# Patient Record
Sex: FEMALE | Race: WHITE | NOT HISPANIC OR LATINO | Employment: PART TIME | URBAN - METROPOLITAN AREA
[De-identification: names, ages, dates, MRNs, and addresses within clinical notes are randomized per-mention and may not be internally consistent; named-entity substitution may affect disease eponyms.]

---

## 2017-01-09 ENCOUNTER — TELEPHONE (OUTPATIENT)
Dept: GASTROENTEROLOGY | Facility: CLINIC | Age: 57
End: 2017-01-09

## 2017-04-19 ENCOUNTER — HOSPITAL ENCOUNTER (EMERGENCY)
Facility: HOSPITAL | Age: 57
Discharge: HOME | End: 2017-04-19
Attending: EMERGENCY MEDICINE | Admitting: EMERGENCY MEDICINE

## 2017-04-19 ENCOUNTER — APPOINTMENT (OUTPATIENT)
Dept: GENERAL RADIOLOGY | Facility: HOSPITAL | Age: 57
End: 2017-04-19

## 2017-04-19 VITALS
SYSTOLIC BLOOD PRESSURE: 117 MMHG | DIASTOLIC BLOOD PRESSURE: 81 MMHG | HEART RATE: 78 BPM | HEIGHT: 63 IN | OXYGEN SATURATION: 96 % | BODY MASS INDEX: 34.55 KG/M2 | TEMPERATURE: 97.9 F | RESPIRATION RATE: 16 BRPM | WEIGHT: 195 LBS

## 2017-04-19 DIAGNOSIS — S82.832A CLOSED FRACTURE OF DISTAL END OF LEFT FIBULA, UNSPECIFIED FRACTURE MORPHOLOGY, INITIAL ENCOUNTER: Primary | ICD-10-CM

## 2017-04-19 PROCEDURE — 99284 EMERGENCY DEPT VISIT MOD MDM: CPT | Performed by: EMERGENCY MEDICINE

## 2017-04-19 PROCEDURE — 73610 X-RAY EXAM OF ANKLE: CPT

## 2017-04-19 PROCEDURE — 99283 EMERGENCY DEPT VISIT LOW MDM: CPT

## 2017-04-19 RX ORDER — SACCHAROMYCES BOULARDII 250 MG
250 CAPSULE ORAL 2 TIMES DAILY
COMMUNITY
End: 2017-05-03

## 2017-04-19 RX ORDER — HYDROCODONE BITARTRATE AND ACETAMINOPHEN 5; 325 MG/1; MG/1
1 TABLET ORAL EVERY 6 HOURS PRN
Qty: 30 TABLET | Refills: 0 | Status: SHIPPED | OUTPATIENT
Start: 2017-04-19 | End: 2017-05-03

## 2017-04-19 RX ORDER — NICOTINE POLACRILEX 2 MG
GUM BUCCAL EVERY OTHER DAY
COMMUNITY

## 2017-04-19 RX ORDER — FLUOXETINE HYDROCHLORIDE 20 MG/1
60 CAPSULE ORAL DAILY
COMMUNITY
End: 2021-05-20

## 2017-04-19 NOTE — DISCHARGE INSTRUCTIONS
Medications directed.  Wear orthoboot for ambulation or any weightbearing activities until seen by Ortho.  Ice and elevation will help with pain and swelling.  Follow-up with Dr. Blanco as above.  Return to ED for medical emergencies.    Hypertension  Hypertension, commonly called high blood pressure, is when the force of blood pumping through your arteries is too strong. Your arteries are the blood vessels that carry blood from your heart throughout your body. A blood pressure reading consists of a higher number over a lower number, such as 110/72. The higher number (systolic) is the pressure inside your arteries when your heart pumps. The lower number (diastolic) is the pressure inside your arteries when your heart relaxes. Ideally you want your blood pressure below 120/80.  Hypertension forces your heart to work harder to pump blood. Your arteries may become narrow or stiff. Having untreated or uncontrolled hypertension can cause heart attack, stroke, kidney disease, and other problems.  RISK FACTORS  Some risk factors for high blood pressure are controllable. Others are not.   Risk factors you cannot control include:   · Race. You may be at higher risk if you are .  · Age. Risk increases with age.  · Gender. Men are at higher risk than women before age 45 years. After age 65, women are at higher risk than men.  Risk factors you can control include:  · Not getting enough exercise or physical activity.  · Being overweight.  · Getting too much fat, sugar, calories, or salt in your diet.  · Drinking too much alcohol.  SIGNS AND SYMPTOMS  Hypertension does not usually cause signs or symptoms. Extremely high blood pressure (hypertensive crisis) may cause headache, anxiety, shortness of breath, and nosebleed.  DIAGNOSIS  To check if you have hypertension, your health care provider will measure your blood pressure while you are seated, with your arm held at the level of your heart. It should be measured  at least twice using the same arm. Certain conditions can cause a difference in blood pressure between your right and left arms. A blood pressure reading that is higher than normal on one occasion does not mean that you need treatment. If it is not clear whether you have high blood pressure, you may be asked to return on a different day to have your blood pressure checked again. Or, you may be asked to monitor your blood pressure at home for 1 or more weeks.  TREATMENT  Treating high blood pressure includes making lifestyle changes and possibly taking medicine. Living a healthy lifestyle can help lower high blood pressure. You may need to change some of your habits.  Lifestyle changes may include:  · Following the DASH diet. This diet is high in fruits, vegetables, and whole grains. It is low in salt, red meat, and added sugars.  · Keep your sodium intake below 2,300 mg per day.  · Getting at least 30-45 minutes of aerobic exercise at least 4 times per week.  · Losing weight if necessary.  · Not smoking.  · Limiting alcoholic beverages.  · Learning ways to reduce stress.  Your health care provider may prescribe medicine if lifestyle changes are not enough to get your blood pressure under control, and if one of the following is true:  · You are 18-59 years of age and your systolic blood pressure is above 140.  · You are 60 years of age or older, and your systolic blood pressure is above 150.  · Your diastolic blood pressure is above 90.  · You have diabetes, and your systolic blood pressure is over 140 or your diastolic blood pressure is over 90.  · You have kidney disease and your blood pressure is above 140/90.  · You have heart disease and your blood pressure is above 140/90.  Your personal target blood pressure may vary depending on your medical conditions, your age, and other factors.  HOME CARE INSTRUCTIONS  · Have your blood pressure rechecked as directed by your health care provider.    · Take medicines only  as directed by your health care provider. Follow the directions carefully. Blood pressure medicines must be taken as prescribed. The medicine does not work as well when you skip doses. Skipping doses also puts you at risk for problems.  · Do not smoke.    · Monitor your blood pressure at home as directed by your health care provider.   SEEK MEDICAL CARE IF:   · You think you are having a reaction to medicines taken.  · You have recurrent headaches or feel dizzy.  · You have swelling in your ankles.  · You have trouble with your vision.  SEEK IMMEDIATE MEDICAL CARE IF:  · You develop a severe headache or confusion.  · You have unusual weakness, numbness, or feel faint.  · You have severe chest or abdominal pain.  · You vomit repeatedly.  · You have trouble breathing.  MAKE SURE YOU:   · Understand these instructions.  · Will watch your condition.  · Will get help right away if you are not doing well or get worse.     This information is not intended to replace advice given to you by your health care provider. Make sure you discuss any questions you have with your health care provider.     Document Released: 12/18/2006 Document Revised: 05/03/2016 Document Reviewed: 10/10/2014  [employer] Patient Education ©2016 [employer]

## 2017-04-19 NOTE — ED NOTES
Went over discharge instructions with patient, alert and oriented at time of discharge, no questions at this time. Left ambulatory.       Maya Horn RN  04/19/17 2579

## 2017-04-19 NOTE — ED PROVIDER NOTES
"Subjective   Patient is a 56 y.o. female presenting with lower extremity pain.   History provided by:  Patient  Lower Extremity Issue   Location:  Ankle  Time since incident:  1 week  Injury: yes    Mechanism of injury comment:  Stepped in a hole as described below.  Ankle location:  L ankle  Pain details:     Severity:  Severe    Onset quality:  Sudden    Duration:  1 week    Timing:  Constant    Progression:  Unchanged  Chronicity:  New  Dislocation: no    Foreign body present:  No foreign bodies  Prior injury to area:  No  Relieved by:  Nothing  Worsened by:  Bearing weight  Ineffective treatments:  Ice  Associated symptoms: decreased ROM (secondary to pain) and swelling    Associated symptoms: no fever    Risk factors: no concern for non-accidental trauma, no frequent fractures, no known bone disorder and no obesity      HPI Narrative:Ms Brizuela is a 44 yo WF who presents left ankle injury.  Patient states last week she was [place] the beach when she stepped in a hole.  She felt immediate pain and heard a popping in her left ankle.  The pain was significant enough that patient \"turned quite\" this was followed by an nausea and vomiting.  Patient has not seen a physician until today.  She has been trying to care for at home.  However it is not improving.  She presents for evaluation.        Review of Systems   Constitutional: Negative.  Negative for appetite change, diaphoresis and fever.   HENT: Negative.    Eyes: Negative.    Respiratory: Negative for cough, chest tightness, shortness of breath and wheezing.    Cardiovascular: Negative for chest pain, palpitations and leg swelling.   Genitourinary: Negative.  Negative for difficulty urinating, flank pain, frequency and hematuria.   Musculoskeletal: Negative.    Skin: Negative.  Negative for color change, pallor and rash.   Neurological: Negative.  Negative for dizziness, seizures, syncope and headaches.   Psychiatric/Behavioral: Negative.  Negative for " agitation, behavioral problems and hallucinations.       Past Medical History:   Diagnosis Date   • Anxiety and depression    • Hyperlipidemia    • Seasonal allergies        No Known Allergies    Past Surgical History:   Procedure Laterality Date   • HYSTERECTOMY         Family History   Problem Relation Age of Onset   • Heart disease Mother    • Colon polyps Brother    • Diabetes Maternal Grandmother        Social History     Social History   • Marital status:      Spouse name: N/A   • Number of children: N/A   • Years of education: N/A     Social History Main Topics   • Smoking status: Never Smoker   • Smokeless tobacco: None   • Alcohol use No   • Drug use: None   • Sexual activity: Not Asked     Other Topics Concern   • None     Social History Narrative           Objective   Physical Exam   Constitutional: She is oriented to person, place, and time. She appears well-developed and well-nourished. No distress.   56-year-old white female laying in bed.  She appears in good overall health.  She is friendly and cooperative.   Musculoskeletal:        Left ankle: She exhibits decreased range of motion and swelling. Tenderness. Lateral malleolus tenderness found. Achilles tendon normal.        Neurological: She is alert and oriented to person, place, and time.   Skin: She is not diaphoretic.   Nursing note and vitals reviewed.      Procedures         ED Course  ED Course   Comment By Time   04/19/17  4:02 PM  X-ray shows fracture distal tip of fibula nondisplaced.  Will place patient in walking boot.  Prescribe pain medication and give orthopedic follow-up. Grayson Dean MD 04/19 1602      Xr Ankle 3+ View Left    Result Date: 4/19/2017  Narrative: LEFT ANKLE, 04/19/2017:  HISTORY: 56-year-old female in the ED complaining of mostly lateral left ankle pain and swelling after injury. Twisted her ankle walking on the beach one week ago.  TECHNIQUE: Three-view left ankle series.  FINDINGS: There is a small,  acute, nondisplaced fracture across the tip of the lateral malleolus. No other ankle fracture is demonstrated.  Soft tissue swelling surrounds the ankle, greatest laterally. Incidentally noted superficial venous varicosities in the medial lower leg.      Impression: 1. Nondisplaced fracture across the tip of the lateral malleolus. 2. Soft tissue swelling as noted.  This report was finalized on 4/19/2017 3:48 PM by Dr. Adrian Zurita MD.                  MDM  Number of Diagnoses or Management Options  Closed fracture of distal end of left fibula, unspecified fracture morphology, initial encounter: new and requires workup     Amount and/or Complexity of Data Reviewed  Tests in the radiology section of CPT®: ordered and reviewed    Risk of Complications, Morbidity, and/or Mortality  Presenting problems: moderate  Diagnostic procedures: low  Management options: moderate    Patient Progress  Patient progress: stable      Final diagnoses:   Closed fracture of distal end of left fibula, unspecified fracture morphology, initial encounter              Grayson Dean MD  04/20/17 0114

## 2017-05-01 ENCOUNTER — APPOINTMENT (OUTPATIENT)
Dept: GENERAL RADIOLOGY | Facility: HOSPITAL | Age: 57
End: 2017-05-01

## 2017-05-01 ENCOUNTER — HOSPITAL ENCOUNTER (OUTPATIENT)
Facility: HOSPITAL | Age: 57
Setting detail: OBSERVATION
Discharge: HOME | End: 2017-05-02
Attending: EMERGENCY MEDICINE | Admitting: HOSPITALIST

## 2017-05-01 DIAGNOSIS — R07.9 CHEST PAIN, UNSPECIFIED TYPE: Primary | ICD-10-CM

## 2017-05-01 LAB
ALBUMIN SERPL-MCNC: 4.1 G/DL (ref 3.5–5.2)
ALBUMIN/GLOB SERPL: 1.5 G/DL
ALP SERPL-CCNC: 88 U/L (ref 40–129)
ALT SERPL W P-5'-P-CCNC: 19 U/L (ref 5–33)
ANION GAP SERPL CALCULATED.3IONS-SCNC: 14 MMOL/L
APTT PPP: 30.6 SECONDS (ref 24.3–38.1)
AST SERPL-CCNC: 17 U/L (ref 5–32)
BASOPHILS # BLD AUTO: 0.07 10*3/MM3 (ref 0–0.2)
BASOPHILS NFR BLD AUTO: 0.7 % (ref 0–2)
BILIRUB SERPL-MCNC: 0.2 MG/DL (ref 0.2–1.2)
BUN BLD-MCNC: 13 MG/DL (ref 6–20)
BUN/CREAT SERPL: 17.3 (ref 7–25)
CALCIUM SPEC-SCNC: 9.2 MG/DL (ref 8.6–10.5)
CHLORIDE SERPL-SCNC: 99 MMOL/L (ref 98–107)
CO2 SERPL-SCNC: 28 MMOL/L (ref 22–29)
CREAT BLD-MCNC: 0.75 MG/DL (ref 0.57–1)
D DIMER PPP FEU-MCNC: <0.3 MCGFEU/ML (ref 0–0.46)
DEPRECATED RDW RBC AUTO: 43.5 FL (ref 37–54)
EOSINOPHIL # BLD AUTO: 0.26 10*3/MM3 (ref 0.1–0.3)
EOSINOPHIL NFR BLD AUTO: 2.7 % (ref 0–4)
ERYTHROCYTE [DISTWIDTH] IN BLOOD BY AUTOMATED COUNT: 13 % (ref 11.5–14.5)
GFR SERPL CREATININE-BSD FRML MDRD: 81 ML/MIN/1.73
GLOBULIN UR ELPH-MCNC: 2.8 GM/DL
GLUCOSE BLD-MCNC: 137 MG/DL (ref 65–99)
HCT VFR BLD AUTO: 41.2 % (ref 37–47)
HGB BLD-MCNC: 13.6 G/DL (ref 12–16)
IMM GRANULOCYTES # BLD: 0.04 10*3/MM3 (ref 0–0.03)
IMM GRANULOCYTES NFR BLD: 0.4 % (ref 0–0.5)
INR PPP: 1.01 (ref 0.9–1.1)
LYMPHOCYTES # BLD AUTO: 2.73 10*3/MM3 (ref 0.6–4.8)
LYMPHOCYTES NFR BLD AUTO: 28.4 % (ref 20–45)
MCH RBC QN AUTO: 30.3 PG (ref 27–31)
MCHC RBC AUTO-ENTMCNC: 33 G/DL (ref 31–37)
MCV RBC AUTO: 91.8 FL (ref 81–99)
MONOCYTES # BLD AUTO: 0.52 10*3/MM3 (ref 0–1)
MONOCYTES NFR BLD AUTO: 5.4 % (ref 3–8)
NEUTROPHILS # BLD AUTO: 5.99 10*3/MM3 (ref 1.5–8.3)
NEUTROPHILS NFR BLD AUTO: 62.4 % (ref 45–70)
NRBC BLD MANUAL-RTO: 0 /100 WBC (ref 0–0)
PLATELET # BLD AUTO: 218 10*3/MM3 (ref 140–500)
PMV BLD AUTO: 10.2 FL (ref 7.4–10.4)
POTASSIUM BLD-SCNC: 3.4 MMOL/L (ref 3.5–5.2)
PROT SERPL-MCNC: 6.9 G/DL (ref 6–8.5)
PROTHROMBIN TIME: 13.3 SECONDS (ref 12.1–15)
RBC # BLD AUTO: 4.49 10*6/MM3 (ref 4.2–5.4)
SODIUM BLD-SCNC: 141 MMOL/L (ref 136–145)
TROPONIN T SERPL-MCNC: <0.01 NG/ML (ref 0–0.03)
WBC NRBC COR # BLD: 9.61 10*3/MM3 (ref 4.8–10.8)

## 2017-05-01 PROCEDURE — 80053 COMPREHEN METABOLIC PANEL: CPT | Performed by: EMERGENCY MEDICINE

## 2017-05-01 PROCEDURE — 85025 COMPLETE CBC W/AUTO DIFF WBC: CPT | Performed by: EMERGENCY MEDICINE

## 2017-05-01 PROCEDURE — 84484 ASSAY OF TROPONIN QUANT: CPT | Performed by: EMERGENCY MEDICINE

## 2017-05-01 PROCEDURE — 99284 EMERGENCY DEPT VISIT MOD MDM: CPT

## 2017-05-01 PROCEDURE — 93010 ELECTROCARDIOGRAM REPORT: CPT | Performed by: INTERNAL MEDICINE

## 2017-05-01 PROCEDURE — 93005 ELECTROCARDIOGRAM TRACING: CPT | Performed by: EMERGENCY MEDICINE

## 2017-05-01 PROCEDURE — 83735 ASSAY OF MAGNESIUM: CPT | Performed by: HOSPITALIST

## 2017-05-01 PROCEDURE — 85730 THROMBOPLASTIN TIME PARTIAL: CPT | Performed by: EMERGENCY MEDICINE

## 2017-05-01 PROCEDURE — 71020 HC CHEST PA AND LATERAL: CPT

## 2017-05-01 PROCEDURE — 99285 EMERGENCY DEPT VISIT HI MDM: CPT | Performed by: EMERGENCY MEDICINE

## 2017-05-01 PROCEDURE — 85379 FIBRIN DEGRADATION QUANT: CPT | Performed by: EMERGENCY MEDICINE

## 2017-05-01 PROCEDURE — 85610 PROTHROMBIN TIME: CPT | Performed by: EMERGENCY MEDICINE

## 2017-05-01 RX ORDER — ASPIRIN 81 MG/1
324 TABLET, CHEWABLE ORAL ONCE
Status: COMPLETED | OUTPATIENT
Start: 2017-05-01 | End: 2017-05-01

## 2017-05-01 RX ADMIN — ASPIRIN 81 MG CHEWABLE TABLET 324 MG: 81 TABLET CHEWABLE at 23:40

## 2017-05-02 ENCOUNTER — APPOINTMENT (OUTPATIENT)
Dept: NUCLEAR MEDICINE | Facility: HOSPITAL | Age: 57
End: 2017-05-02

## 2017-05-02 ENCOUNTER — APPOINTMENT (OUTPATIENT)
Dept: CARDIOLOGY | Facility: HOSPITAL | Age: 57
End: 2017-05-02

## 2017-05-02 VITALS
HEIGHT: 63 IN | HEART RATE: 74 BPM | TEMPERATURE: 98.3 F | SYSTOLIC BLOOD PRESSURE: 126 MMHG | DIASTOLIC BLOOD PRESSURE: 84 MMHG | OXYGEN SATURATION: 96 % | WEIGHT: 200 LBS | RESPIRATION RATE: 18 BRPM | BODY MASS INDEX: 35.44 KG/M2

## 2017-05-02 LAB
ANION GAP SERPL CALCULATED.3IONS-SCNC: 10.4 MMOL/L
BH CV NUCLEAR PRIOR STUDY: 3
BH CV STRESS BP STAGE 1: NORMAL
BH CV STRESS BP STAGE 2: NORMAL
BH CV STRESS BP STAGE 3: NORMAL
BH CV STRESS BP STAGE 4: NORMAL
BH CV STRESS COMMENTS STAGE 1: NORMAL
BH CV STRESS COMMENTS STAGE 2: NORMAL
BH CV STRESS DOSE REGADENOSON STAGE 1: 0.4
BH CV STRESS DURATION MIN STAGE 1: 0
BH CV STRESS DURATION MIN STAGE 2: 4
BH CV STRESS DURATION SEC STAGE 1: 15
BH CV STRESS DURATION SEC STAGE 2: 0
BH CV STRESS HR STAGE 1: 98
BH CV STRESS HR STAGE 2: 108
BH CV STRESS HR STAGE 3: 107
BH CV STRESS HR STAGE 4: 95
BH CV STRESS O2 STAGE 1: 97
BH CV STRESS O2 STAGE 2: 99
BH CV STRESS PROTOCOL 1: NORMAL
BH CV STRESS RECOVERY BP: NORMAL MMHG
BH CV STRESS RECOVERY HR: 95 BPM
BH CV STRESS STAGE 1: 1
BH CV STRESS STAGE 2: 2
BH CV STRESS STAGE 3: 3
BH CV STRESS STAGE 4: 4
BUN BLD-MCNC: 11 MG/DL (ref 6–20)
BUN/CREAT SERPL: 17.2 (ref 7–25)
CALCIUM SPEC-SCNC: 8.8 MG/DL (ref 8.6–10.5)
CHLORIDE SERPL-SCNC: 103 MMOL/L (ref 98–107)
CHOLEST SERPL-MCNC: 170 MG/DL (ref 0–200)
CO2 SERPL-SCNC: 27.6 MMOL/L (ref 22–29)
CREAT BLD-MCNC: 0.64 MG/DL (ref 0.57–1)
GFR SERPL CREATININE-BSD FRML MDRD: 97 ML/MIN/1.73
GLUCOSE BLD-MCNC: 95 MG/DL (ref 65–99)
HDLC SERPL-MCNC: 43 MG/DL (ref 40–60)
LDLC SERPL CALC-MCNC: 115 MG/DL (ref 0–100)
LDLC/HDLC SERPL: 2.67 {RATIO}
LV EF NUC BP: 70 %
MAGNESIUM SERPL-MCNC: 2.3 MG/DL (ref 1.7–2.5)
MAXIMAL PREDICTED HEART RATE: 166 BPM
PERCENT MAX PREDICTED HR: 66.27 %
POTASSIUM BLD-SCNC: 4.3 MMOL/L (ref 3.5–5.2)
SODIUM BLD-SCNC: 141 MMOL/L (ref 136–145)
STRESS BASELINE BP: NORMAL MMHG
STRESS BASELINE HR: 79 BPM
STRESS O2 SAT REST: 97 %
STRESS PERCENT HR: 78 %
STRESS POST ESTIMATED WORKLOAD: 1 METS
STRESS POST EXERCISE DUR SEC: 30 SEC
STRESS POST PEAK BP: NORMAL MMHG
STRESS POST PEAK HR: 110 BPM
STRESS TARGET HR: 141 BPM
TRIGL SERPL-MCNC: 61 MG/DL (ref 0–150)
TROPONIN T SERPL-MCNC: <0.01 NG/ML (ref 0–0.03)
TROPONIN T SERPL-MCNC: <0.01 NG/ML (ref 0–0.03)
VLDLC SERPL-MCNC: 12.2 MG/DL (ref 7–27)

## 2017-05-02 PROCEDURE — A9500 TC99M SESTAMIBI: HCPCS | Performed by: HOSPITALIST

## 2017-05-02 PROCEDURE — 93005 ELECTROCARDIOGRAM TRACING: CPT | Performed by: HOSPITALIST

## 2017-05-02 PROCEDURE — G0378 HOSPITAL OBSERVATION PER HR: HCPCS

## 2017-05-02 PROCEDURE — 84484 ASSAY OF TROPONIN QUANT: CPT | Performed by: HOSPITALIST

## 2017-05-02 PROCEDURE — 93010 ELECTROCARDIOGRAM REPORT: CPT | Performed by: INTERNAL MEDICINE

## 2017-05-02 PROCEDURE — 78452 HT MUSCLE IMAGE SPECT MULT: CPT | Performed by: INTERNAL MEDICINE

## 2017-05-02 PROCEDURE — 0 TECHNETIUM SESTAMIBI: Performed by: HOSPITALIST

## 2017-05-02 PROCEDURE — 80061 LIPID PANEL: CPT | Performed by: HOSPITALIST

## 2017-05-02 PROCEDURE — 80048 BASIC METABOLIC PNL TOTAL CA: CPT | Performed by: HOSPITALIST

## 2017-05-02 PROCEDURE — 93018 CV STRESS TEST I&R ONLY: CPT | Performed by: INTERNAL MEDICINE

## 2017-05-02 PROCEDURE — 78452 HT MUSCLE IMAGE SPECT MULT: CPT

## 2017-05-02 PROCEDURE — 25010000002 REGADENOSON 0.4 MG/5ML SOLUTION: Performed by: HOSPITALIST

## 2017-05-02 PROCEDURE — 96372 THER/PROPH/DIAG INJ SC/IM: CPT

## 2017-05-02 PROCEDURE — 93017 CV STRESS TEST TRACING ONLY: CPT

## 2017-05-02 PROCEDURE — 99244 OFF/OP CNSLTJ NEW/EST MOD 40: CPT | Performed by: INTERNAL MEDICINE

## 2017-05-02 PROCEDURE — 93016 CV STRESS TEST SUPVJ ONLY: CPT | Performed by: INTERNAL MEDICINE

## 2017-05-02 PROCEDURE — 25010000002 ENOXAPARIN PER 10 MG: Performed by: HOSPITALIST

## 2017-05-02 RX ORDER — FLUTICASONE PROPIONATE 50 MCG
2 SPRAY, SUSPENSION (ML) NASAL NIGHTLY
Status: DISCONTINUED | OUTPATIENT
Start: 2017-05-02 | End: 2017-05-02 | Stop reason: HOSPADM

## 2017-05-02 RX ORDER — SODIUM CHLORIDE 0.9 % (FLUSH) 0.9 %
1-10 SYRINGE (ML) INJECTION AS NEEDED
Status: DISCONTINUED | OUTPATIENT
Start: 2017-05-02 | End: 2017-05-02 | Stop reason: HOSPADM

## 2017-05-02 RX ORDER — IBUPROFEN 200 MG
200 TABLET ORAL NIGHTLY
COMMUNITY
End: 2017-05-02 | Stop reason: HOSPADM

## 2017-05-02 RX ORDER — HYDROCODONE BITARTRATE AND ACETAMINOPHEN 5; 325 MG/1; MG/1
1 TABLET ORAL EVERY 6 HOURS PRN
Status: DISCONTINUED | OUTPATIENT
Start: 2017-05-02 | End: 2017-05-02 | Stop reason: HOSPADM

## 2017-05-02 RX ORDER — L.ACID,PARA/B.BIFIDUM/S.THERM 8B CELL
1 CAPSULE ORAL 2 TIMES DAILY
Status: DISCONTINUED | OUTPATIENT
Start: 2017-05-02 | End: 2017-05-02 | Stop reason: HOSPADM

## 2017-05-02 RX ORDER — ACETAMINOPHEN 325 MG/1
650 TABLET ORAL EVERY 6 HOURS PRN
Status: DISCONTINUED | OUTPATIENT
Start: 2017-05-02 | End: 2017-05-02 | Stop reason: HOSPADM

## 2017-05-02 RX ORDER — ASPIRIN 325 MG
325 TABLET, DELAYED RELEASE (ENTERIC COATED) ORAL DAILY
Status: DISCONTINUED | OUTPATIENT
Start: 2017-05-02 | End: 2017-05-02 | Stop reason: HOSPADM

## 2017-05-02 RX ORDER — AZELASTINE 1 MG/ML
1 SPRAY, METERED NASAL DAILY
COMMUNITY
End: 2021-11-16 | Stop reason: SDUPTHER

## 2017-05-02 RX ORDER — NITROGLYCERIN 0.4 MG/1
0.4 TABLET SUBLINGUAL
Status: DISCONTINUED | OUTPATIENT
Start: 2017-05-02 | End: 2017-05-02 | Stop reason: HOSPADM

## 2017-05-02 RX ORDER — FLUOXETINE HYDROCHLORIDE 20 MG/1
60 CAPSULE ORAL DAILY
Status: DISCONTINUED | OUTPATIENT
Start: 2017-05-02 | End: 2017-05-02

## 2017-05-02 RX ORDER — MELATONIN
2000 DAILY
Status: DISCONTINUED | OUTPATIENT
Start: 2017-05-02 | End: 2017-05-02 | Stop reason: HOSPADM

## 2017-05-02 RX ORDER — POTASSIUM CHLORIDE 20 MEQ/1
40 TABLET, EXTENDED RELEASE ORAL ONCE
Status: COMPLETED | OUTPATIENT
Start: 2017-05-02 | End: 2017-05-02

## 2017-05-02 RX ORDER — AZELASTINE 1 MG/ML
1 SPRAY, METERED NASAL DAILY
Status: DISCONTINUED | OUTPATIENT
Start: 2017-05-02 | End: 2017-05-02 | Stop reason: HOSPADM

## 2017-05-02 RX ORDER — FLUOXETINE HYDROCHLORIDE 20 MG/1
60 CAPSULE ORAL NIGHTLY
Status: DISCONTINUED | OUTPATIENT
Start: 2017-05-02 | End: 2017-05-02 | Stop reason: HOSPADM

## 2017-05-02 RX ADMIN — Medication 1 DOSE: at 11:15

## 2017-05-02 RX ADMIN — POTASSIUM CHLORIDE 40 MEQ: 20 TABLET, EXTENDED RELEASE ORAL at 01:48

## 2017-05-02 RX ADMIN — Medication 1 DOSE: at 14:15

## 2017-05-02 RX ADMIN — ENOXAPARIN SODIUM 40 MG: 40 INJECTION SUBCUTANEOUS at 08:56

## 2017-05-02 RX ADMIN — REGADENOSON 0.4 MG: 0.08 INJECTION, SOLUTION INTRAVENOUS at 14:15

## 2017-05-02 RX ADMIN — ASPIRIN 325 MG: 325 TABLET, DELAYED RELEASE ORAL at 08:56

## 2017-05-03 ENCOUNTER — OFFICE VISIT (OUTPATIENT)
Dept: ORTHOPEDIC SURGERY | Facility: CLINIC | Age: 57
End: 2017-05-03

## 2017-05-03 VITALS
HEIGHT: 63 IN | SYSTOLIC BLOOD PRESSURE: 109 MMHG | WEIGHT: 195 LBS | HEART RATE: 90 BPM | DIASTOLIC BLOOD PRESSURE: 80 MMHG | BODY MASS INDEX: 34.55 KG/M2

## 2017-05-03 DIAGNOSIS — S82.839A AVULSION FRACTURE OF DISTAL FIBULA: Primary | ICD-10-CM

## 2017-05-03 PROCEDURE — 27786 TREATMENT OF ANKLE FRACTURE: CPT | Performed by: ORTHOPAEDIC SURGERY

## 2017-05-03 PROCEDURE — 99203 OFFICE O/P NEW LOW 30 MIN: CPT | Performed by: ORTHOPAEDIC SURGERY

## 2017-06-06 ENCOUNTER — OFFICE VISIT (OUTPATIENT)
Dept: ORTHOPEDIC SURGERY | Facility: CLINIC | Age: 57
End: 2017-06-06

## 2017-06-06 DIAGNOSIS — R52 PAIN: Primary | ICD-10-CM

## 2017-06-06 DIAGNOSIS — S82.839A AVULSION FRACTURE OF DISTAL FIBULA: ICD-10-CM

## 2017-06-06 PROCEDURE — 73610 X-RAY EXAM OF ANKLE: CPT | Performed by: ORTHOPAEDIC SURGERY

## 2017-06-06 PROCEDURE — 99024 POSTOP FOLLOW-UP VISIT: CPT | Performed by: ORTHOPAEDIC SURGERY

## 2017-06-11 NOTE — PROGRESS NOTES
CC: F/u closed treatment left distal fibula avulsion fracture, DOI 14 2017    Interval Hx: Pt returns to clinic today stating pain is improved, has already started to wean out of the boot on her own and been fully weightbearing without difficulty. Denies any numbness or tingling in foot, no skin breakdown or irritation.     Exam   Left ankle-minimal TTP along lateral distal fibula    DF 25°    PF 45°    Flex/extend toes without difficulty    Skin intact    Positive sensation left foot    No calf pain or swelling    Imaging   3v xrays left ankle, ordered and reviewed by me, compared to prior office xrays, indication ankle fracture. Stable alignment at a avulsion fracture site, no interval displacement, Acceptable alignment on mortise view with no medial clear space or syndesmosis widening.  Minimal interval callous noted.     Impression: s/p closed treatment left distal fibula avulsion fracture    Plan:  Can wean out of boot completely at this point time, using was of brace only intermittently as needed.    Recommended ankle strengthening exercises, offered physical therapy but patient has declined at this time   F/u in 4 weeks to reassess motion, strength, and functional activity.  No x-rays needed

## 2017-12-11 ENCOUNTER — TRANSCRIBE ORDERS (OUTPATIENT)
Dept: ADMINISTRATIVE | Facility: HOSPITAL | Age: 57
End: 2017-12-11

## 2017-12-11 DIAGNOSIS — Z12.31 SCREENING MAMMOGRAM, ENCOUNTER FOR: Primary | ICD-10-CM

## 2018-01-02 ENCOUNTER — APPOINTMENT (OUTPATIENT)
Dept: MAMMOGRAPHY | Facility: HOSPITAL | Age: 58
End: 2018-01-02
Attending: INTERNAL MEDICINE

## 2018-11-30 ENCOUNTER — TRANSCRIBE ORDERS (OUTPATIENT)
Dept: ADMINISTRATIVE | Facility: HOSPITAL | Age: 58
End: 2018-11-30

## 2018-11-30 DIAGNOSIS — J32.9 CHRONIC SINUSITIS, UNSPECIFIED LOCATION: Primary | ICD-10-CM

## 2018-12-04 ENCOUNTER — HOSPITAL ENCOUNTER (OUTPATIENT)
Dept: CT IMAGING | Facility: HOSPITAL | Age: 58
Discharge: HOME | End: 2018-12-04
Attending: INTERNAL MEDICINE | Admitting: INTERNAL MEDICINE

## 2018-12-04 DIAGNOSIS — J32.9 CHRONIC SINUSITIS, UNSPECIFIED LOCATION: ICD-10-CM

## 2018-12-04 PROCEDURE — 70486 CT MAXILLOFACIAL W/O DYE: CPT

## 2019-03-06 ENCOUNTER — HOSPITAL ENCOUNTER (OUTPATIENT)
Dept: PHYSICAL THERAPY | Facility: HOSPITAL | Age: 59
Setting detail: THERAPIES SERIES
Discharge: HOME | End: 2019-03-06

## 2019-03-06 DIAGNOSIS — M75.52 BURSITIS OF LEFT SHOULDER: Primary | ICD-10-CM

## 2019-03-06 DIAGNOSIS — M75.82 ROTATOR CUFF TENDONITIS, LEFT: ICD-10-CM

## 2019-03-06 PROCEDURE — 97110 THERAPEUTIC EXERCISES: CPT | Performed by: PHYSICAL THERAPIST

## 2019-03-06 PROCEDURE — 97161 PT EVAL LOW COMPLEX 20 MIN: CPT | Performed by: PHYSICAL THERAPIST

## 2019-03-06 NOTE — THERAPY EVALUATION
Outpatient Physical Therapy Ortho Initial Evaluation  SATHYA Abbott     Patient Name: Mary Jo Brizuela  : 1960  MRN: [id number]  Today's Date: 3/6/2019      Visit Date: 2019    Patient Active Problem List   Diagnosis   • Chest pain   • Avulsion fracture of distal fibula        Past Medical History:   Diagnosis Date   • Anemia    • Anxiety and depression    • Hyperlipidemia    • Obesity    • Seasonal allergies         Past Surgical History:   Procedure Laterality Date   • HYSTERECTOMY         Visit Dx:     ICD-10-CM ICD-9-CM   1. Bursitis of left shoulder M75.52 726.10   2. 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  Patient has not worked as  for 4 years.  Patient states that she had to lift her father from floor 2 weeks ago and has had increased pain since.  Patient reports that she has not had any testing on shoulder and has had no prior PT.  Patient was prescribed gabapentin and another med but states she does not want to take due to side effects.   Patient takes ibuprofen.  She reports that her pain has been so bad that she has not been able to drive  -SP      Patient/Caregiver Goals  Relieve pain  -SP      Current Tobacco Use  no  -SP      Patient's Rating of General Health  Good  -SP      Hand Dominance  left-handed  -SP      Occupation/sports/leisure activities  [employer] , she works on computer and reports that this does increase her symptoms  -SP      How has patient tried to help current problem?  OTC anti inflammatories  -SP      What clinical tests have you had for this problem?  -- nnone  -SP         Pain     Pain  Location  Shoulder  -SP      Pain at Present  1  -SP      Pain at Best  0  -SP      Pain at Worst  10  -SP      Pain Frequency  Intermittent  -SP      Pain Description  Numbness;Sharp;Shooting  -SP      What Performance Factors Make the Current Problem(s) WORSE?  working on computer, any activity with shoulder in abduction, pain with swinging arm to walk  -SP      What Performance Factors Make the Current Problem(s) BETTER?  straighten elbow and hang arm  -SP      Tolerance Time- Standing  not limited   -SP      Tolerance Time- Sitting  not limited  -SP      Tolerance Time- Walking  not limited  -SP      Is your sleep disturbed?  Yes  -SP      What position do you sleep in?  -- like to sleep on left but unable  -SP      Difficulties at work?  difficulty tolerating computer work  -SP      Difficulties with ADL's?  difficulty dressing, fixing hair, is limiting use of left UE  -SP         Fall Risk Assessment    Any falls in the past year:  No  -SP         Daily Activities    Primary Language  English  -SP      Are you able to read  Yes  -SP      Are you able to write  Yes  -SP      How does patient learn best?  Listening;Reading  -SP      Teaching needs identified  Home Exercise Program;Management of Condition  -SP      Patient is concerned about/has problems with  Repetitive movements of the hand, arm, shoulder;Reaching over head  -SP      Does patient have problems with the following?  Depression;Anxiety  -SP      Pt Participated in POC and Goals  Yes  -SP         Safety    Are you being hurt, hit, or frightened by anyone at home or in your life?  No  -SP      Are you being neglected by a caregiver  No  -SP        User Key  (r) = Recorded By, (t) = Taken By, (c) = Cosigned By    Initials Name Provider Type    SP Emily Smith, PT Physical Therapist          PT Ortho     Row Name 03/06/19 1300       Posture/Observations    Forward Head  Mild  -SP    Cervical Lordosis  Decreased  -SP    Thoracic Kyphosis   -- CT junction kyphosis  -SP    Rounded Shoulders  Bilateral:;Moderate  -SP    Scapular Elevation  Bilateral:;Moderate  -SP    Posture/Observations Comments  elevated first rib; decreased first rib mobility (B)  -SP       DTR- Upper Quarter Clearing    Biceps (C5/6)  Bilateral:;1- Minimal response  -SP    Brachioradialis (C6)  Bilateral:;1- Minimal response  -SP    Triceps (C7)  Bilateral:;1- Minimal response  -SP       Neural Tension Signs- Upper Quarter Clearing    ULNTT 1  Left:;Postive  -SP    ULNTT 2  Left:;Postive  -SP    ULNTT 3  Left:;Postive  -SP       Sensory Screen for Light Touch- Upper Quarter Clearing    C5 (lateral upper arm)  Left:;Diminished  -SP    C6 (tip of thumb)  Left:;Diminished  -SP    T1 (medial lower arm)  Left:;Diminished  -SP       Shoulder Girdle Accessory Motions    Posterior glide of humerus  Left:;Hypomobile  -SP    Inferior glide of humerus  Left:;Hypomobile  -SP       Shoulder Impingement/Rotator Cuff Special Tests    Carbajal-Danny Test (RC Lesion vs. Bursitis)  Left:;Positive  -SP    Neer Impingement Test (RC Lesion vs. Bursitis)  Left:;Positive  -SP    Full Can Test (RC Lesion)  Left:;Negative  -SP    Empty Can Test (RC Lesion)  Left:;Negative  -SP       Shoulder Girdle Palpation    Subacromial Space  Left:;Tender  -SP    Deltoid  Left:;Tender  -SP    Upper Trap  Left:;Tender;Guarded/taut  -SP       General ROM    GENERAL ROM COMMENTS  cervical, right UE and (B) elbow AROM wfl  -SP       Left Upper Ext    Lt Shoulder Abduction AROM  148 degrees with pain at end range  -SP    Lt Shoulder Abduction PROM  170 degrees with pain at end range  -SP    Lt Shoulder Flexion AROM  121 degrees with pain at end range  -SP    Lt Shoulder Flexion PROM  147 degrees with pain at end range  -SP    Lt Shoulder External Rotation AROM  67 degrees with pain at end range  -SP    Lt Shoulder Internal Rotation AROM  57 degrees with pain at end range  -SP       MMT Left Upper Ext    Lt Shoulder Flexion  MMT, Gross Movement  (3+/5) fair plus  -SP    Lt Shoulder Extension MMT, Gross Movement  (4-/5) good minus  -SP    Lt Shoulder ABduction MMT, Gross Movement  (3+/5) fair plus  -SP    Lt Shoulder Internal Rotation MMT, Gross Movement  (3+/5) fair plus  -SP    Lt Shoulder External Rotation MMT, Gross Movement  (3/5) fair  -SP    Lt Scapular ADduction MMT, Gross Movement  (2+/5) poor plus  -SP    Lt Scapular ADduction & Depression MMT, Gross Movement  (2+/5) poor plus  -SP    Lt Upper Extremity Comments   pain limited strength tests  -SP       Upper Extremity Flexibility    Upper Trapezius  Left:;Moderately limited  -SP    Pect Minor  Left:;Moderately limited  -SP       Transfers    Comment (Transfers)  independent transfers without difficulty  -SP      User Key  (r) = Recorded By, (t) = Taken By, (c) = Cosigned By    Initials Name Provider Type    Emily Mae, PT Physical Therapist                      Therapy Education  Given: HEP, Symptoms/condition management  Program: New  How Provided: Verbal, Written  Provided to: Patient  Level of Understanding: Verbalized, Demonstrated     PT OP Goals     Row Name 03/06/19 1700          PT Short Term Goals    STG Date to Achieve  03/20/19  -SP     STG 1  Patient demonstrates left shoulder AROM shoulder flexion to >130 degrees without complaints of pain at end range  -SP     STG 2  Patient reports decreased pain in left UE by end of work day to <2/10  at worst  -SP     STG 3  Patient exhibits left shoulder internal rotation to >65 degrees to aid in dressing and self care  -SP        Long Term Goals    LTG Date to Achieve  04/05/19  -SP     LTG 1  Patient demonstrates AROM left shoulder to wfl without complaints of pain at end range.    -SP     LTG 2  Patient demonstrates left UE strength increased by one muscle grade to better tolerate home and community ADLs  -SP     LTG 3  Patient independent with HEP  -SP        Time Calculation    PT Goal Re-Cert Due Date   "04/05/19  -SP       User Key  (r) = Recorded By, (t) = Taken By, (c) = Cosigned By    Initials Name Provider Type    Emily Mae, PT Physical Therapist          PT Assessment/Plan     Row Name 03/06/19 1756          PT Assessment    Functional Limitations  Limitation in home management;Limitations in community activities;Performance in work activities;Performance in self-care ADL;Performance in leisure activities;Limitations in functional capacity and performance  -SP     Impairments  Muscle strength;Pain;Posture;Range of motion;Endurance  -SP     Assessment Comments  Patient with chronic history of shoulder pain and injury to left shoulder approximately 2 weeks ago when she assisted her ill father from floor.   Patient presents with pain, decreased ROM, decreased strength and impaired functinal ability to complete home and community ADLs  -SP     Please refer to paper survey for additional self-reported information  Yes  -SP     Rehab Potential  Good  -SP     Patient/caregiver participated in establishment of treatment plan and goals  Yes  -SP     Patient would benefit from skilled therapy intervention  Yes  -SP        PT Plan    PT Frequency  2x/week  -SP     Predicted Duration of Therapy Intervention (Therapy Eval)  4 weeks  -SP     Planned CPT's?  PT EVAL LOW COMPLEXITY: 40133;PT MANUAL THERAPY EA 15 MIN: 82597;PT ULTRASOUND EA 15 MIN: 14705;PT THER PROC EA 15 MIN: 97314;PT ELECTRICAL STIM UNATTEND:   -SP       User Key  (r) = Recorded By, (t) = Taken By, (c) = Cosigned By    Initials Name Provider Type    Emily Mae, PT Physical Therapist          Modalities     Row Name 03/06/19 1700             Other Treatment Provided    Taping / Bracing  kinesiotape: RTC support  \"Y\" strip anchored at deltoid insertion with arm around anterior and posterior aspect of shoulder with space correct across lateral aspect of shoulder  -SP        User Key  (r) = Recorded By, (t) = Taken By, " (c) = Cosigned By    Initials Name Provider Type    Emily Mae, PT Physical Therapist        Exercises     Row Name 03/06/19 1700             Exercise 1    Exercise Name 1  tband rows  -SP      Reps 1  20  -SP      Time 1  green tband  -SP         Exercise 2    Exercise Name 2  tband shoulder IR  -SP      Reps 2  20   -SP      Time 2  green tband  -SP         Exercise 3    Exercise Name 3  tband shoulder ER  -SP      Reps 3  20  -SP      Time 3  green tband  -SP        User Key  (r) = Recorded By, (t) = Taken By, (c) = Cosigned By    Initials Name Provider Type    Emily Mae, PT Physical Therapist           Manual Rx (last 36 hours)      Manual Treatments     Row Name 03/06/19 1700             Manual Rx 1    Manual Rx 1 Location  left shoulder  -SP      Manual Rx 1 Type  PROM into all plans with gentle osc for pain  -SP      Manual Rx 1 Duration  10 reps into all planes  -SP        User Key  (r) = Recorded By, (t) = Taken By, (c) = Cosigned By    Initials Name Provider Type    Emily Mae, PT Physical Therapist                      Outcome Measure Options: Quick DASH  Quick DASH  Open a tight or new jar.: Severe Difficulty  Do heavy household chores (e.g., wash walls, wash floors): Unable  Carry a shopping bag or briefcase: Severe Difficulty  Wash your back: Unable  Use a knife to cut food: Moderate Difficulty  Recreational activities in which you take some force or impact through your arm, should or hand (e.g. golf, hammering, tennis, etc.): Severe Difficulty  During the past week, to what extent has your arm, shoulder, or hand problem interfered with your normal social activites with family, friends, neighbors or groups?: Quite a bit  During the past week, were you limited in your work or other regular daily activities as a result of your arm, shoulder or hand problem?: Very limited  Arm, Shoulder, or hand pain: Severe  Tingling (pins and needles) in your arm,  shoulder, or hand: Severe  During the past week, how much difficulty have you had sleeping because of the pain in your arm, shoulder or hand?: So much Difficulty that I can't sleep  Number of Questions Answered: 11  Quick DASH Score: 79.55  Work Module (Optional)  Using your usual technique for your work?: Severe Difficulty  Doing your usual work because of arm, shoulder or hand pain?: Severe Difficulty  Doing your work as well as you would like?: Moderate Difficulty  Spending your usual amount of time doing your work?: Severe Difficulty  Work Module Score: 68.75         Time Calculation:     Therapy Suggested Charges     Code   Minutes Charges    None             Start Time: 1125  Stop Time: 1240  Time Calculation (min): 75 min     Therapy Charges for Today     Code Description Service Date Service Provider Modifiers Qty    13041933814 HC PT THER PROC EA 15 MIN 3/6/2019 Emily Smith, PT GP 1    81860834855 HC PT EVAL LOW COMPLEXITY 3 3/6/2019 Emily Smith, PT GP 1          PT G-Codes  Outcome Measure Options: Quick DASH  Quick DASH Score: 79.55         Emily Smith, PT  3/6/2019

## 2019-03-12 ENCOUNTER — HOSPITAL ENCOUNTER (OUTPATIENT)
Dept: PHYSICAL THERAPY | Facility: HOSPITAL | Age: 59
Setting detail: THERAPIES SERIES
Discharge: HOME | End: 2019-03-12

## 2019-03-12 DIAGNOSIS — M75.82 ROTATOR CUFF TENDONITIS, LEFT: ICD-10-CM

## 2019-03-12 DIAGNOSIS — M75.52 BURSITIS OF LEFT SHOULDER: Primary | ICD-10-CM

## 2019-03-12 PROCEDURE — 97140 MANUAL THERAPY 1/> REGIONS: CPT | Performed by: PHYSICAL THERAPIST

## 2019-03-12 PROCEDURE — 97110 THERAPEUTIC EXERCISES: CPT | Performed by: PHYSICAL THERAPIST

## 2019-03-12 NOTE — THERAPY TREATMENT NOTE
Outpatient Physical Therapy Ortho Treatment Note  SATHYA Abbott     Patient Name: Mary Jo Brizuela  : 1960  MRN: [id number]  Today's Date: 3/12/2019      Visit Date: 2019    Visit Dx:    ICD-10-CM ICD-9-CM   1. Bursitis of left shoulder M75.52 726.10   2. Rotator cuff tendonitis, left M75.82 726.10       Patient Active Problem List   Diagnosis   • Chest pain   • Avulsion fracture of distal fibula        Past Medical History:   Diagnosis Date   • Anemia    • Anxiety and depression    • Hyperlipidemia    • Obesity    • Seasonal allergies         Past Surgical History:   Procedure Laterality Date   • HYSTERECTOMY         PT Ortho     Row Name 19 1110       Subjective Comments    Subjective Comments  Patient reports that she felt really good after last visit.  She has had intermittent sharp pain in shoulder with reaching activity.  -SP      User Key  (r) = Recorded By, (t) = Taken By, (c) = Cosigned By    Initials Name Provider Type    Emily Mae, PT Physical Therapist                      PT Assessment/Plan     Row Name 19 1331          PT Assessment    Assessment Comments  Patient tolerates progression of ther-ex well without complaints of increased pain.  She does have some discomfort with PROM   -SP        PT Plan    PT Plan Comments  Continue per POC  -SP       User Key  (r) = Recorded By, (t) = Taken By, (c) = Cosigned By    Initials Name Provider Type    Emily Mae, PT Physical Therapist          Modalities     Row Name 19 1110             Moist Heat    MH Applied  Yes patient supine  -SP      Location  left shoulder  -SP      Rx Minutes  12 mins  -SP      MH Prior to Rx  Yes  -SP        User Key  (r) = Recorded By, (t) = Taken By, (c) = Cosigned By    Initials Name Provider Type    Emily Mae, PT Physical Therapist          Exercises     Row Name 19 1110             Subjective Comments    Subjective Comments  Patient reports  that she felt really good after last visit.  She has had intermittent sharp pain in shoulder with reaching activity.  -SP         Exercise 1    Exercise Name 1  tband rows  -SP      Reps 1  20  -SP      Time 1  green tband  -SP         Exercise 2    Exercise Name 2  tband shoulder IR  -SP      Reps 2  20   -SP      Time 2  green tband  -SP         Exercise 3    Exercise Name 3  tband shoulder ER  -SP      Reps 3  20  -SP      Time 3  green tband  -SP         Exercise 4    Exercise Name 4  supine protraction  -SP      Reps 4  30  -SP      Time 4  2 lbs  -SP         Exercise 5    Exercise Name 5  sleeper stretch  -SP      Reps 5  5  -SP      Time 5  15 sec  -SP         Exercise 6    Exercise Name 6  sidelying shoulder ER  -SP      Reps 6  30  -SP      Time 6  2 lbs  -SP        User Key  (r) = Recorded By, (t) = Taken By, (c) = Cosigned By    Initials Name Provider Type    Emily Mae, COLLEEN Physical Therapist                        Manual Rx (last 36 hours)      Manual Treatments     Row Name 03/12/19 1110             Manual Rx 1    Manual Rx 1 Location  left shoulder  -SP      Manual Rx 1 Type  PROM into all plans with gentle osc for pain  -SP      Manual Rx 1 Duration  10 reps into all planes  -SP        User Key  (r) = Recorded By, (t) = Taken By, (c) = Cosigned By    Initials Name Provider Type    Emily Mae PT Physical Therapist              Therapy Education  Given: HEP  Program: Progressed  How Provided: Verbal, Written  Provided to: Patient  Level of Understanding: Verbalized, Demonstrated              Time Calculation:   Start Time: 1110  Stop Time: 1214  Time Calculation (min): 64 min  Therapy Suggested Charges     Code   Minutes Charges    None           Therapy Charges for Today     Code Description Service Date Service Provider Modifiers Qty    74731637077  PT THER PROC EA 15 MIN 3/12/2019 Emily Smith, PT GP 1    51175237040 HC PT MANUAL THERAPY EA 15 MIN  3/12/2019 Emily Smith, PT GP 1                    Emily Smith, PT  3/12/2019

## 2019-03-14 ENCOUNTER — HOSPITAL ENCOUNTER (OUTPATIENT)
Dept: PHYSICAL THERAPY | Facility: HOSPITAL | Age: 59
Setting detail: THERAPIES SERIES
Discharge: HOME | End: 2019-03-14

## 2019-03-14 DIAGNOSIS — M75.52 BURSITIS OF LEFT SHOULDER: Primary | ICD-10-CM

## 2019-03-14 DIAGNOSIS — M75.82 ROTATOR CUFF TENDONITIS, LEFT: ICD-10-CM

## 2019-03-14 PROCEDURE — G0283 ELEC STIM OTHER THAN WOUND: HCPCS | Performed by: PHYSICAL THERAPIST

## 2019-03-14 PROCEDURE — 97110 THERAPEUTIC EXERCISES: CPT | Performed by: PHYSICAL THERAPIST

## 2019-03-14 PROCEDURE — 97140 MANUAL THERAPY 1/> REGIONS: CPT | Performed by: PHYSICAL THERAPIST

## 2019-03-14 NOTE — THERAPY TREATMENT NOTE
"    Outpatient Physical Therapy Ortho Treatment Note  SATHYA Abbott     Patient Name: Mary Jo Brizuela  : 1960  MRN: [id number]  Today's Date: 3/14/2019      Visit Date: 2019    Visit Dx:    ICD-10-CM ICD-9-CM   1. Bursitis of left shoulder M75.52 726.10   2. Rotator cuff tendonitis, left M75.82 726.10       Patient Active Problem List   Diagnosis   • Chest pain   • Avulsion fracture of distal fibula        Past Medical History:   Diagnosis Date   • Anemia    • Anxiety and depression    • Hyperlipidemia    • Obesity    • Seasonal allergies         Past Surgical History:   Procedure Laterality Date   • HYSTERECTOMY         PT Ortho     Row Name 19 1015       Subjective Comments    Subjective Comments  Patient reports that she felt better after last visit until yesterday when she reached for something and felt as if her shoulder \"locked'  and she has been sore since.   -SP    Row Name 19 1110       Subjective Comments    Subjective Comments  Patient reports that she felt really good after last visit.  She has had intermittent sharp pain in shoulder with reaching activity.  -SP      User Key  (r) = Recorded By, (t) = Taken By, (c) = Cosigned By    Initials Name Provider Type    Emily Mae, PT Physical Therapist                      PT Assessment/Plan     Row Name 19 1446 19 1444       PT Assessment    Assessment Comments  Patient tolerates ther-ex despite new onset of pain.  IFC and cold pack applied with some decrease in pain reports after treatment  -SP  Patient   -SP       PT Plan    PT Plan Comments  Continue per POC  -SP  --      User Key  (r) = Recorded By, (t) = Taken By, (c) = Cosigned By    Initials Name Provider Type    Emily Mae, PT Physical Therapist          Modalities     Row Name 19 1015             Moist Heat    MH Applied  Yes  -SP      Location  left shoulder  -SP      Rx Minutes  12 mins  -SP      MH Prior to Rx  Yes  -SP      " "   Ice    Ice Applied  Yes with IFC  -SP      Location  left shoulder   -SP      Rx Minutes  15 mins  -SP      Ice Prior to Rx  No  -SP      Ice S/P Rx  Yes  -SP         ELECTRICAL STIMULATION    Attended/Unattended  Unattended  -SP      Stimulation Type  IFC  -SP      Location/Electrode Placement/Other  left shoulder  -SP         Other Treatment Provided    Taping / Bracing  kinesiotape: RTC support  \"Y\" strip anchored at deltoid insertion with arm around anterior and posterior aspect of shoulder with space correct across lateral aspect of shoulder  -SP        User Key  (r) = Recorded By, (t) = Taken By, (c) = Cosigned By    Initials Name Provider Type    Emily Mae, PT Physical Therapist          Exercises     Row Name 03/14/19 1015             Subjective Comments    Subjective Comments  Patient reports that she felt better after last visit until yesterday when she reached for something and felt as if her shoulder \"locked'  and she has been sore since.   -SP         Exercise 1    Exercise Name 1  tband rows  -SP      Reps 1  30  -SP      Time 1  green tband  -SP         Exercise 2    Exercise Name 2  tband shoulder IR  -SP      Reps 2  30  -SP      Time 2  green tband  -SP         Exercise 3    Exercise Name 3  tband shoulder ER  -SP      Reps 3  30  -SP      Time 3  green tband  -SP         Exercise 4    Exercise Name 4  supine protraction  -SP      Reps 4  30  -SP      Time 4  2 lbs  -SP         Exercise 5    Exercise Name 5  sleeper stretch  -SP      Reps 5  5  -SP      Time 5  15 sec  -SP         Exercise 6    Exercise Name 6  sidelying shoulder ER  -SP      Reps 6  30  -SP      Time 6  1 lbs  -SP        User Key  (r) = Recorded By, (t) = Taken By, (c) = Cosigned By    Initials Name Provider Type    Emily Mae, PT Physical Therapist                        Manual Rx (last 36 hours)      Manual Treatments     Row Name 03/14/19 1015             Manual Rx 1    Manual Rx 1 Location "  left shoulder  -SP      Manual Rx 1 Type  PROM into all plans with gentle osc for pain  -SP      Manual Rx 1 Duration  10 reps into all planes  -SP        User Key  (r) = Recorded By, (t) = Taken By, (c) = Cosigned By    Initials Name Provider Type    Emily Mae, PT Physical Therapist                             Time Calculation:   Start Time: 1015  Stop Time: 1122  Time Calculation (min): 67 min  Therapy Suggested Charges     Code   Minutes Charges    None           Therapy Charges for Today     Code Description Service Date Service Provider Modifiers Qty    53588505306 HC PT THER PROC EA 15 MIN 3/14/2019 Emily Smith, PT GP 1    28516208366 HC PT MANUAL THERAPY EA 15 MIN 3/14/2019 Emily Smith, PT GP 1    19459202645 HC PT ELECTRICAL STIM UNATTENDED 3/14/2019 Emily Smith, PT  1                    Emily Smith, PT  3/14/2019

## 2019-03-19 ENCOUNTER — APPOINTMENT (OUTPATIENT)
Dept: PHYSICAL THERAPY | Facility: HOSPITAL | Age: 59
End: 2019-03-19

## 2019-03-21 ENCOUNTER — HOSPITAL ENCOUNTER (OUTPATIENT)
Dept: PHYSICAL THERAPY | Facility: HOSPITAL | Age: 59
Setting detail: THERAPIES SERIES
Discharge: HOME | End: 2019-03-21

## 2019-03-21 DIAGNOSIS — M75.82 ROTATOR CUFF TENDONITIS, LEFT: ICD-10-CM

## 2019-03-21 DIAGNOSIS — M75.52 BURSITIS OF LEFT SHOULDER: Primary | ICD-10-CM

## 2019-03-21 PROCEDURE — 97110 THERAPEUTIC EXERCISES: CPT | Performed by: PHYSICAL THERAPIST

## 2019-03-21 PROCEDURE — G0283 ELEC STIM OTHER THAN WOUND: HCPCS | Performed by: PHYSICAL THERAPIST

## 2019-03-21 PROCEDURE — 97140 MANUAL THERAPY 1/> REGIONS: CPT | Performed by: PHYSICAL THERAPIST

## 2019-03-21 NOTE — THERAPY TREATMENT NOTE
Outpatient Physical Therapy Ortho Treatment Note  SATHYA Abbott     Patient Name: Mary Jo Brizuela  : 1960  MRN: [id number]  Today's Date: 3/21/2019      Visit Date: 2019    Visit Dx:    ICD-10-CM ICD-9-CM   1. Bursitis of left shoulder M75.52 726.10   2. Rotator cuff tendonitis, left M75.82 726.10       Patient Active Problem List   Diagnosis   • Chest pain   • Avulsion fracture of distal fibula        Past Medical History:   Diagnosis Date   • Anemia    • Anxiety and depression    • Hyperlipidemia    • Obesity    • Seasonal allergies         Past Surgical History:   Procedure Laterality Date   • HYSTERECTOMY         PT Ortho     Row Name 19 1010       Subjective Comments    Subjective Comments  Patient reports that she notices that she feels better and symptoms are manageable if she does her exercises, but if she skips days, then symptoms increased in left UE.  -SP      User Key  (r) = Recorded By, (t) = Taken By, (c) = Cosigned By    Initials Name Provider Type    Emily Mae, PT Physical Therapist                      PT Assessment/Plan     Row Name 19 1310          PT Assessment    Assessment Comments  Patient with decreased pain today and with HEP.  Patient demonstrates good technique and compliance with Rx.  -SP        PT Plan    PT Plan Comments  Continue per POC  -SP       User Key  (r) = Recorded By, (t) = Taken By, (c) = Cosigned By    Initials Name Provider Type    Emily Mae, PT Physical Therapist          Modalities     Row Name 19 1010             Moist Heat    MH Applied  Yes  -SP      Location  left shoulder  -SP      Rx Minutes  12 mins  -SP      MH Prior to Rx  Yes  -SP         Ice    Ice Applied  Yes  -SP      Location  left shoulder   -SP      Rx Minutes  15 mins  -SP      Ice Prior to Rx  No  -SP      Ice S/P Rx  Yes  -SP         ELECTRICAL STIMULATION    Attended/Unattended  Unattended  -SP      Stimulation Type  IFC  -SP       "Location/Electrode Placement/Other  left shoulder  -SP         Other Treatment Provided    Taping / Bracing  kinesiotape: RTC support  \"Y\" strip anchored at deltoid insertion with arm around anterior and posterior aspect of shoulder with space correct across lateral aspect of shoulder  -SP        User Key  (r) = Recorded By, (t) = Taken By, (c) = Cosigned By    Initials Name Provider Type    Emily Mae, PT Physical Therapist        Exercises     Row Name 03/21/19 1010             Subjective Comments    Subjective Comments  Patient reports that she notices that she feels better and symptoms are manageable if she does her exercises, but if she skips days, then symptoms increased in left UE.  -SP         Exercise 1    Exercise Name 1  tband rows  -SP      Reps 1  30  -SP      Time 1  green tband  -SP         Exercise 2    Exercise Name 2  tband shoulder IR  -SP      Reps 2  30  -SP      Time 2  green tband  -SP         Exercise 3    Exercise Name 3  tband shoulder ER  -SP      Reps 3  30  -SP      Time 3  green tband  -SP         Exercise 4    Exercise Name 4  supine protraction  -SP      Reps 4  30  -SP      Time 4  3 lbs  -SP         Exercise 5    Exercise Name 5  sleeper stretch  -SP      Reps 5  5  -SP      Time 5  15 sec  -SP         Exercise 6    Exercise Name 6  sidelying shoulder ER  -SP      Reps 6  30  -SP      Time 6  2 lbs  -SP        User Key  (r) = Recorded By, (t) = Taken By, (c) = Cosigned By    Initials Name Provider Type    Emily Mae, PT Physical Therapist                      Manual Rx (last 36 hours)      Manual Treatments     Row Name 03/21/19 1010             Manual Rx 1    Manual Rx 1 Location  left shoulder  -SP      Manual Rx 1 Type  PROM into all plans with gentle osc for pain  -SP      Manual Rx 1 Duration  10 reps into all planes  -SP        User Key  (r) = Recorded By, (t) = Taken By, (c) = Cosigned By    Initials Name Provider Type    Emily Mae" Carla, PT Physical Therapist                             Time Calculation:   Start Time: 1010  Stop Time: 1130  Time Calculation (min): 80 min  Therapy Charges for Today     Code Description Service Date Service Provider Modifiers Qty    36289743151  PT ELECTRICAL STIM UNATTENDED 3/21/2019 Emily Smith, PT  1    65852109730 HC PT THER PROC EA 15 MIN 3/21/2019 Emily Smith, PT GP 1    13709216816 HC PT MANUAL THERAPY EA 15 MIN 3/21/2019 Emily Smith, PT GP 1                    Emily Smith, PT  3/21/2019

## 2019-03-26 ENCOUNTER — HOSPITAL ENCOUNTER (OUTPATIENT)
Dept: PHYSICAL THERAPY | Facility: HOSPITAL | Age: 59
Setting detail: THERAPIES SERIES
Discharge: HOME | End: 2019-03-26

## 2019-03-26 DIAGNOSIS — M75.82 ROTATOR CUFF TENDONITIS, LEFT: ICD-10-CM

## 2019-03-26 DIAGNOSIS — M75.52 BURSITIS OF LEFT SHOULDER: Primary | ICD-10-CM

## 2019-03-26 PROCEDURE — 97140 MANUAL THERAPY 1/> REGIONS: CPT | Performed by: PHYSICAL THERAPIST

## 2019-03-26 PROCEDURE — G0283 ELEC STIM OTHER THAN WOUND: HCPCS | Performed by: PHYSICAL THERAPIST

## 2019-03-26 PROCEDURE — 97110 THERAPEUTIC EXERCISES: CPT | Performed by: PHYSICAL THERAPIST

## 2019-03-26 NOTE — THERAPY TREATMENT NOTE
Outpatient Physical Therapy Ortho Treatment Note  SATHYA Abbott     Patient Name: Mary Jo Brizuela  : 1960  MRN: [id number]  Today's Date: 3/26/2019      Visit Date: 2019    Visit Dx:    ICD-10-CM ICD-9-CM   1. Bursitis of left shoulder M75.52 726.10   2. Rotator cuff tendonitis, left M75.82 726.10       Patient Active Problem List   Diagnosis   • Chest pain   • Avulsion fracture of distal fibula        Past Medical History:   Diagnosis Date   • Anemia    • Anxiety and depression    • Hyperlipidemia    • Obesity    • Seasonal allergies         Past Surgical History:   Procedure Laterality Date   • HYSTERECTOMY         PT Ortho     Row Name 19 1110       Subjective Comments    Subjective Comments  Patient reports that she felt ok after last visit but after working on computer and performing ADLs she is having increased pain in left shoulder, arm and neck.  Patient states that she continues to have edema and numbness into left UE to hand.    -SP      User Key  (r) = Recorded By, (t) = Taken By, (c) = Cosigned By    Initials Name Provider Type    Emily Mae, PT Physical Therapist                      PT Assessment/Plan     Row Name 19 1342          PT Assessment    Assessment Comments  Patient exhibiting continuing symptoms into left UE including edema and numbness/tingling.  Exercises modified and patient able to complete with cues to avoid overstretching  -SP        PT Plan    PT Plan Comments  Continue per POC  -SP       User Key  (r) = Recorded By, (t) = Taken By, (c) = Cosigned By    Initials Name Provider Type    Emily Mae, PT Physical Therapist          Modalities     Row Name 19 1110             Moist Heat    MH Applied  Yes  -SP      Location  left shoulder  -SP      Rx Minutes  12 mins  -SP      MH Prior to Rx  Yes  -SP         Ice    Ice Applied  Yes  -SP      Location  left shoulder   -SP      Rx Minutes  15 mins  -SP      Ice Prior to Rx   No  -SP      Ice S/P Rx  Yes  -SP         ELECTRICAL STIMULATION    Attended/Unattended  Unattended  -SP      Stimulation Type  IFC  -SP      Location/Electrode Placement/Other  left shoulder  -SP        User Key  (r) = Recorded By, (t) = Taken By, (c) = Cosigned By    Initials Name Provider Type    Emily Mae, COLLEEN Physical Therapist        Exercises     Row Name 03/26/19 1110             Subjective Comments    Subjective Comments  Patient reports that she felt ok after last visit but after working on computer and performing ADLs she is having increased pain in left shoulder, arm and neck.  Patient states that she continues to have edema and numbness into left UE to hand.    -SP         Exercise 1    Exercise Name 1  supine on foam roll (1/2 cylinder)  -SP      Time 1  4 min  -SP         Exercise 2    Exercise Name 2  scalene stretch  -SP      Reps 2  3  -SP      Time 2  15 sec  -SP         Exercise 3    Exercise Name 3  bicep anterior shoulder stretch  -SP      Reps 3  3  -SP      Time 3  15 sec  -SP        User Key  (r) = Recorded By, (t) = Taken By, (c) = Cosigned By    Initials Name Provider Type    Emily Mae, COLLEEN Physical Therapist                      Manual Rx (last 36 hours)      Manual Treatments     Row Name 03/26/19 1110             Manual Rx 1    Manual Rx 1 Location  left shoulder  -SP      Manual Rx 1 Type  PROM into all plans with gentle osc for pain  -SP      Manual Rx 1 Duration  10 reps into all planes  -SP         Manual Rx 2    Manual Rx 2 Location  cervical spine  -SP      Manual Rx 2 Type  1st rib osc inferior mobilization, STM, vertebral glides and manual cervical traction  -SP      Manual Rx 2 Duration  8 min  -SP        User Key  (r) = Recorded By, (t) = Taken By, (c) = Cosigned By    Initials Name Provider Type    Emily Mae, PT Physical Therapist              Therapy Education  Given: HEP  Program: Modified  How Provided: Verbal, Written,  Demonstration  Provided to: Patient  Level of Understanding: Verbalized, Demonstrated              Time Calculation:   Start Time: 1110  Stop Time: 1230  Time Calculation (min): 80 min  Therapy Charges for Today     Code Description Service Date Service Provider Modifiers Qty    85511662808 HC PT ELECTRICAL STIM UNATTENDED 3/26/2019 Emily Smith, PT  1    41743577609 HC PT MANUAL THERAPY EA 15 MIN 3/26/2019 Emily Smith, PT GP 1    36624143122 HC PT THER PROC EA 15 MIN 3/26/2019 Emily Smith, PT GP 1                    Emily Smith, PT  3/26/2019

## 2019-03-28 ENCOUNTER — HOSPITAL ENCOUNTER (OUTPATIENT)
Dept: PHYSICAL THERAPY | Facility: HOSPITAL | Age: 59
Setting detail: THERAPIES SERIES
Discharge: HOME | End: 2019-03-28

## 2019-03-28 DIAGNOSIS — M75.82 ROTATOR CUFF TENDONITIS, LEFT: ICD-10-CM

## 2019-03-28 DIAGNOSIS — M75.52 BURSITIS OF LEFT SHOULDER: Primary | ICD-10-CM

## 2019-03-28 PROCEDURE — G0283 ELEC STIM OTHER THAN WOUND: HCPCS | Performed by: PHYSICAL THERAPIST

## 2019-03-28 PROCEDURE — 97140 MANUAL THERAPY 1/> REGIONS: CPT | Performed by: PHYSICAL THERAPIST

## 2019-03-28 PROCEDURE — 97110 THERAPEUTIC EXERCISES: CPT | Performed by: PHYSICAL THERAPIST

## 2019-03-28 NOTE — THERAPY TREATMENT NOTE
"    Outpatient Physical Therapy Ortho Treatment Note  SATHYA Abbott     Patient Name: Mary Jo Brizuela  : 1960  MRN: [id number]  Today's Date: 3/28/2019      Visit Date: 2019    Visit Dx:    ICD-10-CM ICD-9-CM   1. Bursitis of left shoulder M75.52 726.10   2. Rotator cuff tendonitis, left M75.82 726.10       Patient Active Problem List   Diagnosis   • Chest pain   • Avulsion fracture of distal fibula        Past Medical History:   Diagnosis Date   • Anemia    • Anxiety and depression    • Hyperlipidemia    • Obesity    • Seasonal allergies         Past Surgical History:   Procedure Laterality Date   • HYSTERECTOMY         PT Ortho     Row Name 19 1110       Subjective Comments    Subjective Comments  Patient reports that she had increased pain and \"spasm\" type sensation in left shoulder area following ther-ex.  She reports that her left elbow intermittently feels like it is \"locking up\"  Patient reports she continues to have spasm that start with eye twitching and go to her neck, down shoulder all the way to her hand.  -SP    Row Name 19 1110       Subjective Comments    Subjective Comments  Patient reports that she felt ok after last visit but after working on computer and performing ADLs she is having increased pain in left shoulder, arm and neck.  Patient states that she continues to have edema and numbness into left UE to hand.    -SP      User Key  (r) = Recorded By, (t) = Taken By, (c) = Cosigned By    Initials Name Provider Type    Emily Mae, PT Physical Therapist                      PT Assessment/Plan     Row Name 19 5768          PT Assessment    Assessment Comments  Patient continues to have left UE symptoms despite exercise modification.  Patient continues to need cues to avoid overstretching   -SP        PT Plan    PT Plan Comments  Continue per POC  -SP       User Key  (r) = Recorded By, (t) = Taken By, (c) = Cosigned By    Initials Name Provider Type    SP " "Emily Smith, PT Physical Therapist          Modalities     Row Name 03/28/19 1110             Moist Heat    MH Applied  Yes  -SP      Location  left shoulder  -SP      Rx Minutes  12 mins  -SP      MH Prior to Rx  Yes  -SP         Ice    Ice Applied  Yes  -SP      Location  left shoulder   -SP      Rx Minutes  15 mins  -SP      Ice Prior to Rx  No  -SP      Ice S/P Rx  Yes  -SP         ELECTRICAL STIMULATION    Attended/Unattended  Unattended  -SP      Stimulation Type  IFC  -SP      Location/Electrode Placement/Other  left shoulder  -SP        User Key  (r) = Recorded By, (t) = Taken By, (c) = Cosigned By    Initials Name Provider Type    SP Emily Smith, PT Physical Therapist        Exercises     Row Name 03/28/19 1110             Subjective Comments    Subjective Comments  Patient reports that she had increased pain and \"spasm\" type sensation in left shoulder area following ther-ex.  She reports that her left elbow intermittently feels like it is \"locking up\"  Patient reports she continues to have spasm that start with eye twitching and go to her neck, down shoulder all the way to her hand.  -SP         Exercise 1    Exercise Name 1  supine on foam roll (1/2 cylinder)  -SP      Time 1  4 min  -SP         Exercise 2    Exercise Name 2  scalene stretch  -SP      Reps 2  3  -SP      Time 2  15 sec  -SP         Exercise 3    Exercise Name 3  bicep anterior shoulder stretch  -SP      Reps 3  3  -SP      Time 3  15 sec  -SP         Exercise 4    Exercise Name 4  UT stretch  -SP      Reps 4  3  -SP      Time 4  15 sec  -SP        User Key  (r) = Recorded By, (t) = Taken By, (c) = Cosigned By    Initials Name Provider Type    Emily Mae, PT Physical Therapist                           Therapy Education  Given: HEP  Program: Reinforced, Modified  How Provided: Verbal  Provided to: Patient  Level of Understanding: Verbalized, Demonstrated              Time Calculation:   Start " Time: 1110  Stop Time: 1235  Time Calculation (min): 85 min  Therapy Charges for Today     Code Description Service Date Service Provider Modifiers Qty    44701031021 HC PT ELECTRICAL STIM UNATTENDED 3/28/2019 Emily Smtih, PT  1    49010033992 HC PT MANUAL THERAPY EA 15 MIN 3/28/2019 Emily Smith, PT GP 1    [id number]  PT THER PROC EA 15 MIN 3/28/2019 Emily Smith, PT GP 1                    Emily Smith, PT  3/28/2019

## 2019-04-09 ENCOUNTER — HOSPITAL ENCOUNTER (OUTPATIENT)
Dept: PHYSICAL THERAPY | Facility: HOSPITAL | Age: 59
Setting detail: THERAPIES SERIES
Discharge: HOME | End: 2019-04-09

## 2019-04-09 DIAGNOSIS — M75.82 ROTATOR CUFF TENDONITIS, LEFT: ICD-10-CM

## 2019-04-09 DIAGNOSIS — M75.52 BURSITIS OF LEFT SHOULDER: Primary | ICD-10-CM

## 2019-04-09 PROCEDURE — G0283 ELEC STIM OTHER THAN WOUND: HCPCS | Performed by: PHYSICAL THERAPIST

## 2019-04-09 PROCEDURE — 97140 MANUAL THERAPY 1/> REGIONS: CPT | Performed by: PHYSICAL THERAPIST

## 2019-04-09 NOTE — THERAPY RE-EVALUATION
Outpatient Physical Therapy Ortho Re-Evaluation  SATHYA Abbott     Patient Name: Mary Jo Brizuela  : 1960  MRN: [id number]  Today's Date: 2019      Visit Date: 2019    Patient Active Problem List   Diagnosis   • Chest pain   • Avulsion fracture of distal fibula        Past Medical History:   Diagnosis Date   • Anemia    • Anxiety and depression    • Hyperlipidemia    • Obesity    • Seasonal allergies         Past Surgical History:   Procedure Laterality Date   • HYSTERECTOMY         Visit Dx:     ICD-10-CM ICD-9-CM   1. Bursitis of left shoulder M75.52 726.10   2. Rotator cuff tendonitis, left M75.82 726.10             PT Ortho     Row Name 19 1115       Subjective Comments    Subjective Comments  Patient reports that she stopped doing stretches and exercises and has been feeling much better.  Patient states that she traveled to [place] and riding in vehicle really aggravated her symptoms, but once she arrived and rested without doing stretches, her symptoms improved significantly.    -SP       Posture/Observations    Forward Head  Mild  -SP    Cervical Lordosis  Decreased  -SP    Thoracic Kyphosis  -- CT junction kyphosis  -SP    Rounded Shoulders  Bilateral:;Moderate  -SP    Scapular Elevation  Bilateral:;Moderate  -SP    Posture/Observations Comments  elevated first rib; decreased first rib mobility (B)  -SP       DTR- Upper Quarter Clearing    Biceps (C5/6)  Bilateral:;1- Minimal response  -SP    Brachioradialis (C6)  Bilateral:;1- Minimal response  -SP    Triceps (C7)  Bilateral:;1- Minimal response  -SP       Neural Tension Signs- Upper Quarter Clearing    ULNTT 1  Left:;Postive  -SP    ULNTT 2  Left:;Postive  -SP    ULNTT 3  Left:;Postive  -SP       Sensory Screen for Light Touch- Upper Quarter Clearing    C5 (lateral upper arm)  Left:;Diminished  -SP    C6 (tip of thumb)  Left:;Diminished  -SP    T1 (medial lower arm)  Left:;Diminished  -SP       Shoulder Girdle Accessory Motions     Posterior glide of humerus  Left:;Hypomobile  -SP    Inferior glide of humerus  Left:;Hypomobile  -SP       Shoulder Impingement/Rotator Cuff Special Tests    Empty Can Test (RC Lesion)  Left:;Negative  -SP       Shoulder Girdle Palpation    Subacromial Space  Left:;Tender  -SP    Deltoid  Left:;Tender  -SP    Upper Trap  Left:;Tender;Guarded/taut  -SP       Left Upper Ext    Lt Shoulder Abduction AROM  150 degrees  -SP    Lt Shoulder Abduction PROM  171 degrees  -SP    Lt Shoulder Flexion AROM  138 degrees  -SP    Lt Shoulder Flexion PROM  155 degrees  -SP    Lt Shoulder External Rotation AROM  72 degrees  -SP    Lt Shoulder Internal Rotation AROM  60 degrees  -SP       MMT Left Upper Ext    Lt Shoulder Flexion MMT, Gross Movement  (4-/5) good minus  -SP    Lt Shoulder Extension MMT, Gross Movement  (4-/5) good minus  -SP    Lt Shoulder ABduction MMT, Gross Movement  (4-/5) good minus  -SP    Lt Shoulder Internal Rotation MMT, Gross Movement  (4-/5) good minus  -SP    Lt Shoulder External Rotation MMT, Gross Movement  (3+/5) fair plus  -SP    Lt Scapular ADduction MMT, Gross Movement  (3/5) fair  -SP    Lt Scapular ADduction & Depression MMT, Gross Movement  (3/5) fair  -SP       Upper Extremity Flexibility    Upper Trapezius  Left:;Moderately limited  -SP    Pect Minor  Left:;Moderately limited  -SP       Transfers    Comment (Transfers)  independent transfers sup/sit/stand  -SP      User Key  (r) = Recorded By, (t) = Taken By, (c) = Cosigned By    Initials Name Provider Type    Emily Mae, PT Physical Therapist                  [unfilled]          PT OP Goals     Row Name 04/09/19 0115          PT Short Term Goals    STG Date to Achieve  03/20/19  -SP     STG 1  Patient demonstrates left shoulder AROM shoulder flexion to >130 degrees without complaints of pain at end range  -SP     STG 1 Progress  Progressing  -SP     STG 2  Patient reports decreased pain in left UE by end of work day to  <2/10  at worst  -SP     STG 2 Progress  Partially Met;Ongoing;Progressing  -SP     STG 3  Patient exhibits left shoulder internal rotation to >65 degrees to aid in dressing and self care  -SP     STG 3 Progress  Ongoing  -SP        Long Term Goals    LTG Date to Achieve  04/05/19  -SP     LTG 1  Patient demonstrates AROM left shoulder to wfl without complaints of pain at end range.    -SP     LTG 1 Progress  Ongoing  -SP     LTG 2  Patient demonstrates left UE strength increased by one muscle grade to better tolerate home and community ADLs  -SP     LTG 2 Progress  Ongoing  -SP     LTG 3  Patient independent with HEP  -SP     LTG 3 Progress  Ongoing  -SP       User Key  (r) = Recorded By, (t) = Taken By, (c) = Cosigned By    Initials Name Provider Type    Emily Mae, PT Physical Therapist          PT Assessment/Plan     Row Name 04/09/19 1419          PT Assessment    Assessment Comments  Prior to recently traveling out of town, patient was having increased and ongoing symptoms with more evident edema in left UE and nerve type symptoms.  Patient has recently traveled out of town and did not do exercises and is now feeling less pain.    -SP        PT Plan    PT Plan Comments  Physician note sent to Dr. Cool regarding patient symptoms.  Will await further orders.  -SP       User Key  (r) = Recorded By, (t) = Taken By, (c) = Cosigned By    Initials Name Provider Type    Emily Mae, PT Physical Therapist          Modalities     Row Name 04/09/19 1115             Moist Heat    MH Applied  Yes  -SP      Location  left shoulder  -SP      Rx Minutes  12 mins  -SP      MH Prior to Rx  Yes  -SP         Ice    Ice Applied  Yes  -SP      Location  left shoulder   -SP      Rx Minutes  15 mins  -SP      Ice Prior to Rx  No  -SP      Ice S/P Rx  Yes  -SP         ELECTRICAL STIMULATION    Attended/Unattended  Unattended  -SP      Stimulation Type  IFC  -SP      Location/Electrode  Placement/Other  left shoulder  -SP        User Key  (r) = Recorded By, (t) = Taken By, (c) = Cosigned By    Initials Name Provider Type    Emily Mae, PT Physical Therapist        Exercises     Row Name 04/09/19 1115             Subjective Comments    Subjective Comments  Patient reports that she stopped doing stretches and exercises and has been feeling much better.  Patient states that she traveled to [place] and riding in vehicle really aggravated her symptoms, but once she arrived and rested without doing stretches, her symptoms improved significantly.    -SP         Exercise 1    Exercise Name 1  supine on foam roll (1/2 cylinder)  -SP      Time 1  5 min  -SP        User Key  (r) = Recorded By, (t) = Taken By, (c) = Cosigned By    Initials Name Provider Type    Emily Mae, PT Physical Therapist           Manual Rx (last 36 hours)      Manual Treatments     Row Name 04/09/19 1115             Manual Rx 1    Manual Rx 1 Location  left shoulder  -SP      Manual Rx 1 Type  PROM into all plans with gentle osc for pain  -SP      Manual Rx 1 Duration  10 reps into all planes  -SP         Manual Rx 2    Manual Rx 2 Location  cervical spine  -SP      Manual Rx 2 Type  1st rib osc inferior mobilization, STM, vertebral glides and manual cervical traction  -SP      Manual Rx 2 Duration  8 min  -SP        User Key  (r) = Recorded By, (t) = Taken By, (c) = Cosigned By    Initials Name Provider Type    Emily Mae, PT Physical Therapist                                Time Calculation:     Start Time: 1115  Stop Time: 1240  Time Calculation (min): 85 min     Therapy Charges for Today     Code Description Service Date Service Provider Modifiers Qty    90817150095  PT MANUAL THERAPY EA 15 MIN 4/9/2019 Emily Smith, PT GP 1    [id number]  PT ELECTRICAL STIM UNATTENDED 4/9/2019 Emily Smith, PT  1                    Emily Smith PT  4/9/2019

## 2019-04-11 ENCOUNTER — TRANSCRIBE ORDERS (OUTPATIENT)
Dept: ADMINISTRATIVE | Facility: HOSPITAL | Age: 59
End: 2019-04-11

## 2019-04-11 DIAGNOSIS — M79.89 LEFT ARM SWELLING: ICD-10-CM

## 2019-04-11 DIAGNOSIS — G89.29 CHRONIC LEFT SHOULDER PAIN: Primary | ICD-10-CM

## 2019-04-11 DIAGNOSIS — M54.12 LEFT CERVICAL RADICULOPATHY: ICD-10-CM

## 2019-04-11 DIAGNOSIS — M25.512 CHRONIC LEFT SHOULDER PAIN: Primary | ICD-10-CM

## 2019-04-11 DIAGNOSIS — M25.612 DECREASED ROM OF LEFT SHOULDER: ICD-10-CM

## 2019-04-18 ENCOUNTER — HOSPITAL ENCOUNTER (OUTPATIENT)
Dept: PHYSICAL THERAPY | Facility: HOSPITAL | Age: 59
Setting detail: THERAPIES SERIES
Discharge: HOME | End: 2019-04-18

## 2019-04-18 DIAGNOSIS — M75.52 BURSITIS OF LEFT SHOULDER: Primary | ICD-10-CM

## 2019-04-18 DIAGNOSIS — M75.82 ROTATOR CUFF TENDONITIS, LEFT: ICD-10-CM

## 2019-04-18 PROCEDURE — G0283 ELEC STIM OTHER THAN WOUND: HCPCS | Performed by: PHYSICAL THERAPIST

## 2019-04-18 PROCEDURE — 97140 MANUAL THERAPY 1/> REGIONS: CPT | Performed by: PHYSICAL THERAPIST

## 2019-04-18 NOTE — THERAPY TREATMENT NOTE
Outpatient Physical Therapy Ortho Treatment Note  SATHYA Abbott     Patient Name: Mary Jo Brizuela  : 1960  MRN: [id number]  Today's Date: 2019      Visit Date: 2019    Visit Dx:    ICD-10-CM ICD-9-CM   1. Bursitis of left shoulder M75.52 726.10   2. Rotator cuff tendonitis, left M75.82 726.10       Patient Active Problem List   Diagnosis   • Chest pain   • Avulsion fracture of distal fibula        Past Medical History:   Diagnosis Date   • Anemia    • Anxiety and depression    • Hyperlipidemia    • Obesity    • Seasonal allergies         Past Surgical History:   Procedure Laterality Date   • HYSTERECTOMY         PT Ortho     Row Name 19 8095       Subjective Comments    Subjective Comments  Patient reports that she Dr. Cool and discussed having an MRI.  Patient is interested in seeing a specialist as her symptoms continue to be aggravated.  Patient states that she continues to have pain in cervical area and throughout left arm with swelling throughout left UE.    -SP      User Key  (r) = Recorded By, (t) = Taken By, (c) = Cosigned By    Initials Name Provider Type    Emily Mae, PT Physical Therapist                      PT Assessment/Plan     Row Name 19 4073          PT Assessment    Assessment Comments  Patient with continued symptoms.  Hold on left UE exercises due to decreased tolerance for.  Emphasis on gentle pectoralis and scalene stretches  -SP        PT Plan    PT Plan Comments  Continue to progress postural muscle strengthening and stretches as tolerable  -SP       User Key  (r) = Recorded By, (t) = Taken By, (c) = Cosigned By    Initials Name Provider Type    Emily Mae, PT Physical Therapist          Modalities     Row Name 19 1415             Moist Heat    MH Applied  Yes  -SP      Location  left shoulder  -SP      Rx Minutes  12 mins  -SP      MH Prior to Rx  Yes  -SP         Ice    Ice Applied  Yes  -SP      Location  left  "shoulder   -SP      Rx Minutes  15 mins  -SP      Ice Prior to Rx  No  -SP      Ice S/P Rx  Yes  -SP         ELECTRICAL STIMULATION    Attended/Unattended  Unattended  -SP      Stimulation Type  IFC  -SP      Location/Electrode Placement/Other  left shoulder  -SP         Other Treatment Provided    Taping / Bracing  kinesiotape: RTC support  \"Y\" strip anchored at deltoid insertion with arm around anterior and posterior aspect of shoulder with space correct across lateral aspect of shoulder  -SP        User Key  (r) = Recorded By, (t) = Taken By, (c) = Cosigned By    Initials Name Provider Type    Emily Mae, PT Physical Therapist        Exercises     Row Name 04/18/19 1415             Subjective Comments    Subjective Comments  Patient reports that she Dr. Cool and discussed having an MRI.  Patient is interested in seeing a specialist as her symptoms continue to be aggravated.  Patient states that she continues to have pain in cervical area and throughout left arm with swelling throughout left UE.    -SP         Exercise 1    Exercise Name 1  supine on foam roll (1/2 cylinder)  -SP      Time 1  5 min  -SP        User Key  (r) = Recorded By, (t) = Taken By, (c) = Cosigned By    Initials Name Provider Type    Emily Mae, PT Physical Therapist                      Manual Rx (last 36 hours)      Manual Treatments     Row Name 04/18/19 1415             Manual Rx 2    Manual Rx 2 Location  cervical spine  -SP      Manual Rx 2 Type  1st rib osc inferior mobilization, STM, vertebral glides and manual cervical traction  -SP      Manual Rx 2 Duration  12 min  -SP        User Key  (r) = Recorded By, (t) = Taken By, (c) = Cosigned By    Initials Name Provider Type    Emily Mae, PT Physical Therapist              Therapy Education  Given: Posture/body mechanics              Time Calculation:   Start Time: 1415  Stop Time: 1520  Time Calculation (min): 65 min  Therapy Charges " for Today     Code Description Service Date Service Provider Modifiers Qty    92225523629 HC PT ELECTRICAL STIM UNATTENDED 4/18/2019 Emily Smith, PT  1    40620166537 HC PT MANUAL THERAPY EA 15 MIN 4/18/2019 Emily Smith, PT GP 1                    Emily Smith, PT  4/18/2019

## 2019-04-18 NOTE — THERAPY TREATMENT NOTE
Outpatient Physical Therapy Ortho Treatment Note  SATHYA Abbott     Patient Name: Mary Jo Brizuela  : 1960  MRN: [id number]  Today's Date: 2019      Visit Date: 2019    Visit Dx:    ICD-10-CM ICD-9-CM   1. Bursitis of left shoulder M75.52 726.10   2. Rotator cuff tendonitis, left M75.82 726.10       Patient Active Problem List   Diagnosis   • Chest pain   • Avulsion fracture of distal fibula        Past Medical History:   Diagnosis Date   • Anemia    • Anxiety and depression    • Hyperlipidemia    • Obesity    • Seasonal allergies         Past Surgical History:   Procedure Laterality Date   • HYSTERECTOMY         PT Ortho     Row Name 19 6455       Subjective Comments    Subjective Comments  Patient reports that she Dr. Cool and discussed having an MRI.  Patient is interested in seeing a specialist as her symptoms continue to be aggravated.  Patient states that she continues to have pain in cervical area and throughout left arm with swelling throughout left UE.    -SP      User Key  (r) = Recorded By, (t) = Taken By, (c) = Cosigned By    Initials Name Provider Type    Emily Mae, PT Physical Therapist                      PT Assessment/Plan     Row Name 19 0338          PT Assessment    Assessment Comments  Patient with continued symptoms.  Hold on left UE exercises due to decreased tolerance for.  Emphasis on gentle pectoralis and scalene stretches  -SP        PT Plan    PT Plan Comments  Continue to progress postural muscle strengthening and stretches as tolerable  -SP       User Key  (r) = Recorded By, (t) = Taken By, (c) = Cosigned By    Initials Name Provider Type    Emily Mae, PT Physical Therapist          Modalities     Row Name 19 1415             Moist Heat    MH Applied  Yes  -SP      Location  left shoulder  -SP      Rx Minutes  12 mins  -SP      MH Prior to Rx  Yes  -SP         Ice    Ice Applied  Yes  -SP      Location  left  "shoulder   -SP      Rx Minutes  15 mins  -SP      Ice Prior to Rx  No  -SP      Ice S/P Rx  Yes  -SP         ELECTRICAL STIMULATION    Attended/Unattended  Unattended  -SP      Stimulation Type  IFC  -SP      Location/Electrode Placement/Other  left shoulder  -SP         Other Treatment Provided    Taping / Bracing  kinesiotape: RTC support  \"Y\" strip anchored at deltoid insertion with arm around anterior and posterior aspect of shoulder with space correct across lateral aspect of shoulder  -SP        User Key  (r) = Recorded By, (t) = Taken By, (c) = Cosigned By    Initials Name Provider Type    Emily Mae, PT Physical Therapist        Exercises     Row Name 04/18/19 1415             Subjective Comments    Subjective Comments  Patient reports that she Dr. Cool and discussed having an MRI.  Patient is interested in seeing a specialist as her symptoms continue to be aggravated.  Patient states that she continues to have pain in cervical area and throughout left arm with swelling throughout left UE.    -SP         Exercise 1    Exercise Name 1  supine on foam roll (1/2 cylinder)  -SP      Time 1  5 min  -SP        User Key  (r) = Recorded By, (t) = Taken By, (c) = Cosigned By    Initials Name Provider Type    Emily Mae, PT Physical Therapist                      Manual Rx (last 36 hours)      Manual Treatments     Row Name 04/18/19 1415             Manual Rx 2    Manual Rx 2 Location  cervical spine  -SP      Manual Rx 2 Type  1st rib osc inferior mobilization, STM, vertebral glides and manual cervical traction  -SP      Manual Rx 2 Duration  12 min  -SP        User Key  (r) = Recorded By, (t) = Taken By, (c) = Cosigned By    Initials Name Provider Type    Emily Mae, PT Physical Therapist              Therapy Education  Given: Posture/body mechanics              Time Calculation:   Start Time: 1415  Stop Time: 1520  Time Calculation (min): 65 min  Therapy Charges " for Today     Code Description Service Date Service Provider Modifiers Qty    82551261820 HC PT ELECTRICAL STIM UNATTENDED 4/18/2019 Emily Smith, PT  1    37343849327 HC PT MANUAL THERAPY EA 15 MIN 4/18/2019 Emily Smith, PT GP 1                    Emily Smith, PT  4/18/2019

## 2019-04-23 ENCOUNTER — APPOINTMENT (OUTPATIENT)
Dept: PHYSICAL THERAPY | Facility: HOSPITAL | Age: 59
End: 2019-04-23

## 2019-04-25 ENCOUNTER — HOSPITAL ENCOUNTER (OUTPATIENT)
Dept: PHYSICAL THERAPY | Facility: HOSPITAL | Age: 59
Setting detail: THERAPIES SERIES
Discharge: HOME | End: 2019-04-25

## 2019-04-25 DIAGNOSIS — M75.82 ROTATOR CUFF TENDONITIS, LEFT: ICD-10-CM

## 2019-04-25 DIAGNOSIS — M75.52 BURSITIS OF LEFT SHOULDER: Primary | ICD-10-CM

## 2019-04-25 PROCEDURE — G0283 ELEC STIM OTHER THAN WOUND: HCPCS | Performed by: PHYSICAL THERAPIST

## 2019-04-25 PROCEDURE — 97140 MANUAL THERAPY 1/> REGIONS: CPT | Performed by: PHYSICAL THERAPIST

## 2019-04-25 NOTE — THERAPY TREATMENT NOTE
Outpatient Physical Therapy Ortho Treatment Note  SATHYA Abbott     Patient Name: Mary Jo Brizuela  : 1960  MRN: [id number]  Today's Date: 2019      Visit Date: 2019    Visit Dx:    ICD-10-CM ICD-9-CM   1. Bursitis of left shoulder M75.52 726.10   2. Rotator cuff tendonitis, left M75.82 726.10       Patient Active Problem List   Diagnosis   • Chest pain   • Avulsion fracture of distal fibula        Past Medical History:   Diagnosis Date   • Anemia    • Anxiety and depression    • Hyperlipidemia    • Obesity    • Seasonal allergies         Past Surgical History:   Procedure Laterality Date   • HYSTERECTOMY         PT Ortho     Row Name 19 1110       Subjective Comments    Subjective Comments  Patient reports that she has been very stressed and busy due to a recent death of friend.  She reports that her pain level has been elevated but she is feeling better today.   -SP       Posture/Observations    Posture/Observations Comments  elevated first rib, decreased mobility (b)  -SP      User Key  (r) = Recorded By, (t) = Taken By, (c) = Cosigned By    Initials Name Provider Type    Emily Mae, PT Physical Therapist                      PT Assessment/Plan     Row Name 19 1851          PT Assessment    Assessment Comments  Patient continues to have limited tolerance for any exercise or activity with UE.  She continues to presents with decreased mobility of (B) first ribs  -SP        PT Plan    PT Plan Comments  Continue to progress as tolerable  -SP       User Key  (r) = Recorded By, (t) = Taken By, (c) = Cosigned By    Initials Name Provider Type    Emily Mae, PT Physical Therapist          Modalities     Row Name 19 1110             Moist Heat    MH Applied  Yes  -SP      Location  left shoulder  -SP      Rx Minutes  12 mins  -SP      MH Prior to Rx  Yes  -SP         Ice    Ice Applied  Yes  -SP      Location  left shoulder   -SP      Rx Minutes  15  "mins  -SP      Ice Prior to Rx  No  -SP      Ice S/P Rx  Yes  -SP         ELECTRICAL STIMULATION    Attended/Unattended  Unattended  -SP      Stimulation Type  IFC  -SP      Location/Electrode Placement/Other  left shoulder  -SP         Other Treatment Provided    Taping / Bracing  kinesiotape: RTC support  \"Y\" strip anchored at deltoid insertion with arm around anterior and posterior aspect of shoulder with space correct across lateral aspect of shoulder  -SP        User Key  (r) = Recorded By, (t) = Taken By, (c) = Cosigned By    Initials Name Provider Type    Emily Mae, PT Physical Therapist        Exercises     Row Name 04/25/19 1110             Subjective Comments    Subjective Comments  Patient reports that she has been very stressed and busy due to a recent death of friend.  She reports that her pain level has been elevated but she is feeling better today.   -SP         Exercise 1    Exercise Name 1  supine on foam roll (1/2 cylinder)  -SP      Time 1  5 min  -SP         Exercise 2    Exercise Name 2  first rib self mobilization  -SP      Reps 2  3  -SP      Time 2  15 sec  -SP        User Key  (r) = Recorded By, (t) = Taken By, (c) = Cosigned By    Initials Name Provider Type    Emily Mae, PT Physical Therapist                           Therapy Education  Given: HEP, Posture/body mechanics  Program: Progressed  How Provided: Verbal  Provided to: Patient  Level of Understanding: Verbalized, Demonstrated              Time Calculation:   Start Time: 1110  Stop Time: 1208  Time Calculation (min): 58 min  Therapy Charges for Today     Code Description Service Date Service Provider Modifiers Qty    25495886821 HC PT ELECTRICAL STIM UNATTENDED 4/25/2019 Emily Smith, PT  1    45150591873 HC PT MANUAL THERAPY EA 15 MIN 4/25/2019 Emily Smith, PT GP 1                    Emily Smith, PT  4/25/2019     "

## 2019-04-30 ENCOUNTER — HOSPITAL ENCOUNTER (OUTPATIENT)
Dept: PHYSICAL THERAPY | Facility: HOSPITAL | Age: 59
Setting detail: THERAPIES SERIES
Discharge: HOME | End: 2019-04-30

## 2019-04-30 DIAGNOSIS — M75.82 ROTATOR CUFF TENDONITIS, LEFT: ICD-10-CM

## 2019-04-30 DIAGNOSIS — M75.52 BURSITIS OF LEFT SHOULDER: Primary | ICD-10-CM

## 2019-04-30 PROCEDURE — G0283 ELEC STIM OTHER THAN WOUND: HCPCS | Performed by: PHYSICAL THERAPIST

## 2019-04-30 PROCEDURE — 97140 MANUAL THERAPY 1/> REGIONS: CPT | Performed by: PHYSICAL THERAPIST

## 2019-05-02 ENCOUNTER — HOSPITAL ENCOUNTER (OUTPATIENT)
Dept: PHYSICAL THERAPY | Facility: HOSPITAL | Age: 59
Setting detail: THERAPIES SERIES
Discharge: HOME | End: 2019-05-02

## 2019-05-02 DIAGNOSIS — M75.52 BURSITIS OF LEFT SHOULDER: Primary | ICD-10-CM

## 2019-05-02 DIAGNOSIS — M75.82 ROTATOR CUFF TENDONITIS, LEFT: ICD-10-CM

## 2019-05-02 PROCEDURE — 97140 MANUAL THERAPY 1/> REGIONS: CPT | Performed by: PHYSICAL THERAPIST

## 2019-05-02 PROCEDURE — G0283 ELEC STIM OTHER THAN WOUND: HCPCS | Performed by: PHYSICAL THERAPIST

## 2019-05-02 PROCEDURE — 97110 THERAPEUTIC EXERCISES: CPT | Performed by: PHYSICAL THERAPIST

## 2019-05-02 NOTE — THERAPY TREATMENT NOTE
Outpatient Physical Therapy Ortho Treatment Note  SATHYA Abbott     Patient Name: Mary Jo Brizuela  : 1960  MRN: [id number]  Today's Date: 2019      Visit Date: 2019    Visit Dx:    ICD-10-CM ICD-9-CM   1. Bursitis of left shoulder M75.52 726.10   2. Rotator cuff tendonitis, left M75.82 726.10       Patient Active Problem List   Diagnosis   • Chest pain   • Avulsion fracture of distal fibula        Past Medical History:   Diagnosis Date   • Anemia    • Anxiety and depression    • Hyperlipidemia    • Obesity    • Seasonal allergies         Past Surgical History:   Procedure Laterality Date   • HYSTERECTOMY         PT Ortho     Row Name 19 1110       Subjective Comments    Subjective Comments  Patient reports that she was very sore after last visit but is feeling better today.   She reports [place] and will have to do lots of activity, carrying, cleaning etc  -SP    Row Name 19 1400       Subjective Comments    Subjective Comments  Patient states that first rib stretch performed last visit really increased her pain.  Patient reports that she has been off work for the last couple of days and has felt better because of this.   -SP       Posture/Observations    Posture/Observations Comments  elevated first rib (B): decreased first rib mobility (B)  -SP      User Key  (r) = Recorded By, (t) = Taken By, (c) = Cosigned By    Initials Name Provider Type    Emily Mae, PT Physical Therapist                      PT Assessment/Plan     Row Name 19 2952          PT Assessment    Assessment Comments  Patient continues to exhbitis significant pectoralis tightness and difficulty with stretching.  Overall improvement in posture noted with decreased elevated shoulders  -SP        PT Plan    PT Plan Comments  Continue to address postural strengthening and stretching  -SP       User Key  (r) = Recorded By, (t) = Taken By, (c) = Cosigned By     "Initials Name Provider Type    Emily Mae, PT Physical Therapist          Modalities     Row Name 05/02/19 1110             Moist Heat    MH Applied  Yes  -SP      Location  left shoulder  -SP      Rx Minutes  12 mins  -SP      MH Prior to Rx  Yes  -SP         Ice    Ice Applied  Yes  -SP      Location  left shoulder   -SP      Rx Minutes  15 mins  -SP      Ice Prior to Rx  No  -SP      Ice S/P Rx  Yes  -SP         ELECTRICAL STIMULATION    Attended/Unattended  Unattended  -SP      Stimulation Type  IFC  -SP      Location/Electrode Placement/Other  left shoulder  -SP         Other Treatment Provided    Taping / Bracing  kinesiotape: RTC support  \"Y\" strip anchored at deltoid insertion with arm around anterior and posterior aspect of shoulder with space correct across lateral aspect of shoulder; \"I\" strip anchored anterior to first rib and pulled along scapul  -SP        User Key  (r) = Recorded By, (t) = Taken By, (c) = Cosigned By    Initials Name Provider Type    Emily Mae, PT Physical Therapist        Exercises     Row Name 05/02/19 1110             Subjective Comments    Subjective Comments  Patient reports that she was very sore after last visit but is feeling better today.   She reports [place] and will have to do lots of activity, carrying, cleaning etc  -SP         Exercise 1    Exercise Name 1  supine on foam roll (1/2 cylinder)  -SP      Time 1  5 min  -SP         Exercise 2    Exercise Name 2  UT stretch with hold to table  -SP      Reps 2  3  -SP      Time 2  15 sec  -SP         Exercise 3    Exercise Name 3  pec stretch with towel behind back  -SP         Exercise 4    Exercise Name 4  (B) shoulder ER  -SP      Reps 4  15  -SP      Time 4  red  -SP        User Key  (r) = Recorded By, (t) = Taken By, (c) = Cosigned By    Initials Name Provider Type    Emily Mae, PT Physical Therapist                      Manual " Rx (last 36 hours)      Manual Treatments     Row Name 05/02/19 1110             Manual Rx 2    Manual Rx 2 Location  cervical spine  -SP      Manual Rx 2 Type  1st rib osc inferior mobilization, STM, vertebral glides and manual cervical traction  -SP      Manual Rx 2 Duration  12 min  -SP        User Key  (r) = Recorded By, (t) = Taken By, (c) = Cosigned By    Initials Name Provider Type    SP Emily Smith, PT Physical Therapist              Therapy Education  Given: HEP  Program: Progressed  How Provided: Verbal  Provided to: Patient  Level of Understanding: Verbalized, Demonstrated              Time Calculation:   Start Time: 1110  Stop Time: 1215  Time Calculation (min): 65 min  Therapy Charges for Today     Code Description Service Date Service Provider Modifiers Qty    87471794688 HC PT ELECTRICAL STIM UNATTENDED 5/2/2019 Emily Smith, PT  1    55551596479 HC PT MANUAL THERAPY EA 15 MIN 5/2/2019 Emily Smith, PT GP 1    65605365970 HC PT THER PROC EA 15 MIN 5/2/2019 Emily Smith, PT GP 1                    Emily Smith, PT  5/2/2019

## 2019-05-07 ENCOUNTER — APPOINTMENT (OUTPATIENT)
Dept: PHYSICAL THERAPY | Facility: HOSPITAL | Age: 59
End: 2019-05-07

## 2019-05-09 ENCOUNTER — HOSPITAL ENCOUNTER (OUTPATIENT)
Dept: PHYSICAL THERAPY | Facility: HOSPITAL | Age: 59
Setting detail: THERAPIES SERIES
Discharge: HOME | End: 2019-05-09

## 2019-05-09 DIAGNOSIS — M75.82 ROTATOR CUFF TENDONITIS, LEFT: ICD-10-CM

## 2019-05-09 DIAGNOSIS — M75.52 BURSITIS OF LEFT SHOULDER: Primary | ICD-10-CM

## 2019-05-09 PROCEDURE — G0283 ELEC STIM OTHER THAN WOUND: HCPCS | Performed by: PHYSICAL THERAPIST

## 2019-05-09 PROCEDURE — 97110 THERAPEUTIC EXERCISES: CPT | Performed by: PHYSICAL THERAPIST

## 2019-05-09 NOTE — THERAPY TREATMENT NOTE
Outpatient Physical Therapy Ortho Treatment Note  SATHYA Abbott     Patient Name: Mary Jo Brizuela  : 1960  MRN: [id number]  Today's Date: 2019      Visit Date: 2019    Visit Dx:    ICD-10-CM ICD-9-CM   1. Bursitis of left shoulder M75.52 726.10   2. Rotator cuff tendonitis, left M75.82 726.10       Patient Active Problem List   Diagnosis   • Chest pain   • Avulsion fracture of distal fibula        Past Medical History:   Diagnosis Date   • Anemia    • Anxiety and depression    • Hyperlipidemia    • Obesity    • Seasonal allergies         Past Surgical History:   Procedure Laterality Date   • HYSTERECTOMY         PT Ortho     Row Name 19 1115       Subjective Comments    Subjective Comments  Patient reports that she went to [facility] over the weekend and had to lift and carry objects and also had to do a lot of cleaning.  She reports that she tried to take rests and be cautious with activity, but did get sore, although not as bad as she thought.  Patient reports that kinesiotape applied last visit seemed to help a lot.  Patient reports that her arm feels weak and she wants to resume strengthening activity.    -SP      User Key  (r) = Recorded By, (t) = Taken By, (c) = Cosigned By    Initials Name Provider Type    Emily Mae, PT Physical Therapist                      PT Assessment/Plan     Row Name 19 9818          PT Assessment    Assessment Comments  Patient with good response to kinesiotape placed for first rib depression.  Patient tolerates progression to strengthening exercises well  -SP        PT Plan    PT Plan Comments  Re-evaluation next visit  -SP       User Key  (r) = Recorded By, (t) = Taken By, (c) = Cosigned By    Initials Name Provider Type    Emily Mae, PT Physical Therapist          Modalities     Row Name 19 1115             Moist Heat    MH Applied  Yes  -SP      Location  left shoulder  -SP      Rx Minutes  12 mins  -SP       "MH Prior to Rx  Yes  -SP         Ice    Ice Applied  Yes  -SP      Location  left shoulder   -SP      Rx Minutes  15 mins  -SP      Ice Prior to Rx  No  -SP      Ice S/P Rx  Yes  -SP         ELECTRICAL STIMULATION    Attended/Unattended  Unattended  -SP      Stimulation Type  IFC  -SP      Location/Electrode Placement/Other  left shoulder  -SP         Other Treatment Provided    Taping / Bracing  kinesiotape: RTC support  \"Y\" strip anchored at deltoid insertion with arm around anterior and posterior aspect of shoulder with space correct across lateral aspect of shoulder; \"I\" strip anchored anterior to first rib and pulled along scapula  -SP        User Key  (r) = Recorded By, (t) = Taken By, (c) = Cosigned By    Initials Name Provider Type    Emily Mae, PT Physical Therapist        Exercises     Row Name 05/09/19 8725             Subjective Comments    Subjective Comments  Patient reports that she went to [facility] over the weekend and had to lift and carry objects and also had to do a lot of cleaning.  She reports that she tried to take rests and be cautious with activity, but did get sore, although not as bad as she thought.  Patient reports that kinesiotape applied last visit seemed to help a lot.  Patient reports that her arm feels weak and she wants to resume strengthening activity.    -SP         Exercise 1    Exercise Name 1  supine on foam roll (1/2 cylinder)  -SP      Cueing 1  Verbal  -SP      Time 1  5 min  -SP         Exercise 2    Exercise Name 2  UT stretch with hold to table  -SP      Cueing 2  Verbal;Demo  -SP      Reps 2  3  -SP      Time 2  15 sec  -SP         Exercise 3    Exercise Name 3  pec stretch with towel behind back  -SP      Cueing 3  Verbal;Demo  -SP      Reps 3  3  -SP      Time 3  15 sec  -SP         Exercise 4    Exercise Name 4  (B) shoulder ER with scapula retraction  -SP      Cueing 4  Verbal;Demo  -SP      Reps 4  15  -SP      Time 4  red  -SP         Exercise 5 "    Exercise Name 5  tband shoulder extension with scap retract  -SP      Cueing 5  Verbal;Demo  -SP      Reps 5  20  -SP      Time 5  green  -SP         Exercise 6    Exercise Name 6  tband rows  -SP      Cueing 6  Verbal;Demo  -SP      Reps 6  20  -SP      Time 6  green  -SP        User Key  (r) = Recorded By, (t) = Taken By, (c) = Cosigned By    Initials Name Provider Type    Emily Mae, PT Physical Therapist                      Manual Rx (last 36 hours)      Manual Treatments     Row Name 05/09/19 1115             Manual Rx 2    Manual Rx 2 Location  cervical spine  -SP      Manual Rx 2 Type  1st rib osc inferior mobilization, STM, vertebral glides and manual cervical traction  -SP      Manual Rx 2 Duration  12 min  -SP        User Key  (r) = Recorded By, (t) = Taken By, (c) = Cosigned By    Initials Name Provider Type    Emily Mae, PT Physical Therapist              Therapy Education  Given: HEP  Program: Progressed  How Provided: Verbal  Provided to: Patient  Level of Understanding: Verbalized, Demonstrated              Time Calculation:   Start Time: 1115  Stop Time: 1229  Time Calculation (min): 74 min  Therapy Charges for Today     Code Description Service Date Service Provider Modifiers Qty    79835430445 HC PT ELECTRICAL STIM UNATTENDED 5/9/2019 Emily Smith, PT  1    83017532862 HC PT THER PROC EA 15 MIN 5/9/2019 Emily Smith, PT GP 1                    Emily Smith, PT  5/9/2019

## 2019-05-14 ENCOUNTER — APPOINTMENT (OUTPATIENT)
Dept: PHYSICAL THERAPY | Facility: HOSPITAL | Age: 59
End: 2019-05-14

## 2019-05-16 ENCOUNTER — HOSPITAL ENCOUNTER (OUTPATIENT)
Dept: PHYSICAL THERAPY | Facility: HOSPITAL | Age: 59
Setting detail: THERAPIES SERIES
Discharge: HOME | End: 2019-05-16

## 2019-05-16 DIAGNOSIS — M75.82 ROTATOR CUFF TENDONITIS, LEFT: ICD-10-CM

## 2019-05-16 DIAGNOSIS — M75.52 BURSITIS OF LEFT SHOULDER: Primary | ICD-10-CM

## 2019-05-16 PROCEDURE — G0283 ELEC STIM OTHER THAN WOUND: HCPCS | Performed by: PHYSICAL THERAPIST

## 2019-05-16 PROCEDURE — 97110 THERAPEUTIC EXERCISES: CPT | Performed by: PHYSICAL THERAPIST

## 2019-05-16 NOTE — THERAPY RE-EVALUATION
Outpatient Physical Therapy Ortho Re-Evaluation  SATHYA Abbott     Patient Name: Mary Jo Brizuela  : 1960  MRN: [id number]  Today's Date: 2019      Visit Date: 2019    Patient Active Problem List   Diagnosis   • Chest pain   • Avulsion fracture of distal fibula        Past Medical History:   Diagnosis Date   • Anemia    • Anxiety and depression    • Hyperlipidemia    • Obesity    • Seasonal allergies         Past Surgical History:   Procedure Laterality Date   • HYSTERECTOMY         Visit Dx:     ICD-10-CM ICD-9-CM   1. Bursitis of left shoulder M75.52 726.10   2. Rotator cuff tendonitis, left M75.82 726.10             PT Ortho     Row Name 19 1110       Subjective Comments    Subjective Comments  Patient reports that she has had some stress over the last few days and has been sore but is feeling better today with less pain.   Patient reports that her symptoms are aggravated with lift, push, pullling activity or any increased UE activity.  She has noticed overall improvement in symptoms over the last few weeks and states that kinesiotape applied to left first rib area did seem to help but her skin was irritated.  Patient reports that she feels like her arm is weak.  Patient states she is consistently trying to be more attentive to her posture.   -SP       Posture/Observations    Forward Head  Mild  -SP    Cervical Lordosis  Decreased  -SP    Thoracic Kyphosis  -- CT junction kyphosis  -SP    Rounded Shoulders  Bilateral:;Moderate  -SP    Scapular Elevation  Bilateral:;Moderate  -SP    Posture/Observations Comments  elevated first rib (B): decreased first rib mobility (B)  -SP       DTR- Upper Quarter Clearing    Biceps (C5/6)  Bilateral:;1- Minimal response  -SP    Brachioradialis (C6)  Bilateral:;1- Minimal response  -SP    Triceps (C7)  Bilateral:;1- Minimal response  -SP       Neural Tension Signs- Upper Quarter Clearing    ULNTT 1  Left:;Postive  -SP    ULNTT 2  Left:;Postive  -SP     ULNTT 3  Left:;Postive  -SP       Sensory Screen for Light Touch- Upper Quarter Clearing    C5 (lateral upper arm)  Left:;Diminished  -SP    C6 (tip of thumb)  Left:;Diminished  -SP    T1 (medial lower arm)  Left:;Diminished  -SP       Shoulder Girdle Accessory Motions    Posterior glide of humerus  Left:;Hypomobile  -SP    Inferior glide of humerus  Left:;Hypomobile  -SP       Shoulder Impingement/Rotator Cuff Special Tests    Empty Can Test (RC Lesion)  Left:;Negative  -SP       Shoulder Girdle Palpation    Subacromial Space  Left:;Tender  -SP    Deltoid  Left:;Tender  -SP    Upper Trap  Left:;Tender;Guarded/taut  -SP       General ROM    GENERAL ROM COMMENTS  cervical and right UE AROM wfl  -SP       Left Upper Ext    Lt Shoulder Abduction AROM  155 degrees  -SP    Lt Shoulder Abduction PROM  170 degrees  -SP    Lt Shoulder Flexion AROM  140 degrees  -SP    Lt Shoulder Flexion PROM  159 degrees  -SP    Lt Shoulder External Rotation AROM  75 degrees  -SP    Lt Shoulder Internal Rotation AROM  60 degrees  -SP       MMT Left Upper Ext    Lt Shoulder Flexion MMT, Gross Movement  (4-/5) good minus  -SP    Lt Shoulder Extension MMT, Gross Movement  (4-/5) good minus  -SP    Lt Shoulder ABduction MMT, Gross Movement  (4-/5) good minus  -SP    Lt Shoulder Internal Rotation MMT, Gross Movement  (4-/5) good minus  -SP    Lt Shoulder External Rotation MMT, Gross Movement  (3+/5) fair plus  -SP    Lt Scapular ADduction MMT, Gross Movement  (3/5) fair  -SP    Lt Scapular ADduction & Depression MMT, Gross Movement  (3/5) fair  -SP    Lt Upper Extremity Comments   pain limited  -SP       Upper Extremity Flexibility    Upper Trapezius  Left:;Moderately limited  -SP    Pect Minor  Left:;Moderately limited  -SP       Transfers    Comment (Transfers)  independent transfers sup/sit/stand  -SP      User Key  (r) = Recorded By, (t) = Taken By, (c) = Cosigned By    Initials Name Provider Type    Emily Mae, PT Physical  Therapist                      Therapy Education  Given: HEP, Posture/body mechanics  Program: Reinforced  How Provided: Verbal  Provided to: Patient  Level of Understanding: Verbalized, Demonstrated     PT OP Goals     Row Name 05/16/19 1110          PT Short Term Goals    STG Date to Achieve  05/31/19  -SP     STG 1  Patient demonstrates left shoulder AROM shoulder flexion to >130 degrees without complaints of pain at end range  -SP     STG 1 Progress  Partially Met;Ongoing  -SP     STG 2  Patient reports decreased pain in left UE by end of work day to <2/10  at worst  -SP     STG 2 Progress  Partially Met;Ongoing;Progressing  -SP     STG 3  Patient exhibits left shoulder internal rotation to >65 degrees to aid in dressing and self care  -SP     STG 3 Progress  Ongoing;Progressing  -SP        Long Term Goals    LTG Date to Achieve  04/05/19  -SP     LTG 1  Patient demonstrates AROM left shoulder to wfl without complaints of pain at end range.    -SP     LTG 1 Progress  Ongoing  -SP     LTG 2  Patient demonstrates left UE strength increased by one muscle grade to better tolerate home and community ADLs  -SP     LTG 2 Progress  Ongoing  -SP     LTG 3  Patient independent with HEP  -SP     LTG 3 Progress  Ongoing  -SP        Time Calculation    PT Goal Re-Cert Due Date  06/15/19  -SP       User Key  (r) = Recorded By, (t) = Taken By, (c) = Cosigned By    Initials Name Provider Type    Emily Mae, PT Physical Therapist          PT Assessment/Plan     Row Name 05/16/19 7043          PT Assessment    Assessment Comments  Patient has made progress toward goals.  She is having periods of decreased pain and demonstrates improved postural awareness.  Patient is slowly progressing with UE strengthening activity.  She continues to have limited tolerance for heavier lifing ADLs.  -SP        PT Plan    PT Frequency  1x/week;2x/week  -SP     Predicted Duration of Therapy Intervention (Therapy Eval)  4 weeks  -SP      PT Plan Comments  Continue 1-2x week  for 3-4 weeks to progress postural strengthening and stretching.   -SP       User Key  (r) = Recorded By, (t) = Taken By, (c) = Cosigned By    Initials Name Provider Type    Emily Mae, PT Physical Therapist          Modalities     Row Name 05/16/19 1110             Moist Heat    MH Applied  Yes  -SP      Location  left shoulder  -SP      Rx Minutes  12 mins  -SP      MH Prior to Rx  Yes  -SP         Ice    Ice Applied  Yes  -SP      Location  left shoulder   -SP      Rx Minutes  15 mins  -SP      Ice Prior to Rx  No  -SP      Ice S/P Rx  Yes  -SP         ELECTRICAL STIMULATION    Attended/Unattended  Unattended  -SP      Stimulation Type  IFC  -SP      Location/Electrode Placement/Other  left shoulder  -SP        User Key  (r) = Recorded By, (t) = Taken By, (c) = Cosigned By    Initials Name Provider Type    Emily Mae, PT Physical Therapist        Exercises     Row Name 05/16/19 1110             Subjective Comments    Subjective Comments  Patient reports that she has had some stress over the last few days and has been sore but is feeling better today with less pain.   Patient reports that her symptoms are aggravated with lift, push, pullling activity or any increased UE activity.  She has noticed overall improvement in symptoms over the last few weeks and states that kinesiotape applied to left first rib area did seem to help but her skin was irritated.  Patient reports that she feels like her arm is weak.  Patient states she is consistently trying to be more attentive to her posture.   -SP         Exercise 1    Exercise Name 1  supine on foam roll (1/2 cylinder)  -SP      Cueing 1  Verbal  -SP      Time 1  5 min  -SP         Exercise 2    Exercise Name 2  UT stretch with hold to table  -SP      Cueing 2  Verbal;Demo  -SP      Reps 2  3  -SP      Time 2  15 sec  -SP         Exercise 3    Exercise Name 3  pec stretch with towel behind back   -SP      Cueing 3  Verbal;Demo  -SP      Reps 3  3  -SP      Time 3  15 sec  -SP         Exercise 4    Exercise Name 4  (B) shoulder ER with scapula retraction  -SP      Cueing 4  Verbal;Demo  -SP      Reps 4  15  -SP      Time 4  red  -SP         Exercise 5    Exercise Name 5  tband shoulder extension with scap retract  -SP      Cueing 5  Verbal;Demo  -SP      Reps 5  20  -SP      Time 5  green  -SP         Exercise 6    Exercise Name 6  tband rows  -SP      Cueing 6  Verbal;Demo  -SP      Reps 6  20  -SP      Time 6  green  -SP        User Key  (r) = Recorded By, (t) = Taken By, (c) = Cosigned By    Initials Name Provider Type    Emily Mae, PT Physical Therapist           Manual Rx (last 36 hours)      Manual Treatments     Row Name 05/16/19 1110             Manual Rx 2    Manual Rx 2 Location  cervical spine  -SP      Manual Rx 2 Type  1st rib osc inferior mobilization, STM, vertebral glides and manual cervical traction  -SP      Manual Rx 2 Duration  12 min  -SP        User Key  (r) = Recorded By, (t) = Taken By, (c) = Cosigned By    Initials Name Provider Type    Emily Mae, PT Physical Therapist                                Time Calculation:     Start Time: 1110  Stop Time: 1221  Time Calculation (min): 71 min     Therapy Charges for Today     Code Description Service Date Service Provider Modifiers Qty    93874841673 HC PT THER PROC EA 15 MIN 5/16/2019 Emily Smith, PT GP 1    55307932267 HC PT ELECTRICAL STIM UNATTENDED 5/16/2019 Emily Smith, PT  1                    Emily Simth, PT  5/16/2019

## 2019-05-22 ENCOUNTER — HOSPITAL ENCOUNTER (OUTPATIENT)
Dept: PHYSICAL THERAPY | Facility: HOSPITAL | Age: 59
Setting detail: THERAPIES SERIES
Discharge: HOME | End: 2019-05-22

## 2019-05-22 DIAGNOSIS — M75.82 ROTATOR CUFF TENDONITIS, LEFT: ICD-10-CM

## 2019-05-22 DIAGNOSIS — M75.52 BURSITIS OF LEFT SHOULDER: Primary | ICD-10-CM

## 2019-05-22 PROCEDURE — G0283 ELEC STIM OTHER THAN WOUND: HCPCS | Performed by: PHYSICAL THERAPIST

## 2019-05-22 PROCEDURE — 97110 THERAPEUTIC EXERCISES: CPT | Performed by: PHYSICAL THERAPIST

## 2019-05-22 NOTE — THERAPY TREATMENT NOTE
Outpatient Physical Therapy Ortho Treatment Note  SATHYA Abbott     Patient Name: Mary Jo Brizuela  : 1960  MRN: [id number]  Today's Date: 2019      Visit Date: 2019    Visit Dx:    ICD-10-CM ICD-9-CM   1. Bursitis of left shoulder M75.52 726.10   2. Rotator cuff tendonitis, left M75.82 726.10       Patient Active Problem List   Diagnosis   • Chest pain   • Avulsion fracture of distal fibula        Past Medical History:   Diagnosis Date   • Anemia    • Anxiety and depression    • Hyperlipidemia    • Obesity    • Seasonal allergies         Past Surgical History:   Procedure Laterality Date   • HYSTERECTOMY         PT Ortho     Row Name 19 1020       Subjective Comments    Subjective Comments  Patient reports that she has not been consistently doing exercises at home.  She reports that she is feeling better overall and has been able to doing more light lifting for ADLs  -SP      User Key  (r) = Recorded By, (t) = Taken By, (c) = Cosigned By    Initials Name Provider Type    Emily Mae, PT Physical Therapist                      PT Assessment/Plan     Row Name 19 1345          PT Assessment    Assessment Comments  Patient with improving tolerance for exercise and light ADLs  -SP        PT Plan    PT Plan Comments  Continue for 2-3 weeks  -SP       User Key  (r) = Recorded By, (t) = Taken By, (c) = Cosigned By    Initials Name Provider Type    Emily Mae, PT Physical Therapist          Modalities     Row Name 19 1020             Moist Heat    MH Applied  Yes  -SP      Location  left shoulder  -SP      Rx Minutes  12 mins  -SP      MH Prior to Rx  Yes  -SP         Ice    Ice Applied  Yes  -SP      Location  left shoulder   -SP      Rx Minutes  15 mins  -SP      Ice Prior to Rx  No  -SP      Ice S/P Rx  Yes  -SP         ELECTRICAL STIMULATION    Attended/Unattended  Unattended  -SP      Stimulation Type  IFC  -SP      Location/Electrode Placement/Other   left shoulder  -SP        User Key  (r) = Recorded By, (t) = Taken By, (c) = Cosigned By    Initials Name Provider Type    Emily Mae, PT Physical Therapist        Exercises     Row Name 05/22/19 1020             Subjective Comments    Subjective Comments  Patient reports that she has not been consistently doing exercises at home.  She reports that she is feeling better overall and has been able to doing more light lifting for ADLs  -SP         Exercise 1    Exercise Name 1  supine on foam roll (1/2 cylinder)  -SP      Cueing 1  Verbal  -SP      Time 1  5 min  -SP         Exercise 2    Exercise Name 2  UT stretch with hold to table  -SP      Cueing 2  Verbal;Demo  -SP      Reps 2  3  -SP      Time 2  15 sec  -SP         Exercise 3    Exercise Name 3  pec stretch with towel behind back  -SP      Cueing 3  Verbal;Demo  -SP      Reps 3  3  -SP      Time 3  15 sec  -SP         Exercise 4    Exercise Name 4  (B) shoulder ER with scapula retraction  -SP      Cueing 4  Verbal;Demo  -SP      Reps 4  20  -SP      Time 4  red  -SP         Exercise 5    Exercise Name 5  tband shoulder extension with scap retract  -SP      Cueing 5  Verbal;Demo  -SP      Reps 5  30  -SP      Time 5  green  -SP         Exercise 6    Exercise Name 6  tband rows  -SP      Cueing 6  Verbal;Demo  -SP      Reps 6  30  -SP      Time 6  green  -SP        User Key  (r) = Recorded By, (t) = Taken By, (c) = Cosigned By    Initials Name Provider Type    Emily Mae, PT Physical Therapist                      Manual Rx (last 36 hours)      Manual Treatments     Row Name 05/22/19 1020             Manual Rx 2    Manual Rx 2 Location  cervical spine  -SP      Manual Rx 2 Type  1st rib osc inferior mobilization, STM, vertebral glides and manual cervical traction  -SP      Manual Rx 2 Duration  12 min  -SP        User Key  (r) = Recorded By, (t) = Taken By, (c) = Cosigned By    Initials Name Provider Type    Emily Mae  Carla, PT Physical Therapist              Therapy Education  Given: HEP  Program: Progressed  How Provided: Verbal  Provided to: Caregiver  Level of Understanding: Demonstrated              Time Calculation:   Start Time: 1020  Stop Time: 1135  Time Calculation (min): 75 min  Therapy Charges for Today     Code Description Service Date Service Provider Modifiers Qty    50194761407 HC PT ELECTRICAL STIM UNATTENDED 5/22/2019 Emily Smith, PT  1    79235519195 HC PT THER PROC EA 15 MIN 5/22/2019 Emily Smith, PT GP 1                    Emily Smith, PT  5/22/2019

## 2019-05-28 ENCOUNTER — HOSPITAL ENCOUNTER (OUTPATIENT)
Dept: PHYSICAL THERAPY | Facility: HOSPITAL | Age: 59
Setting detail: THERAPIES SERIES
Discharge: HOME | End: 2019-05-28

## 2019-05-28 DIAGNOSIS — M75.52 BURSITIS OF LEFT SHOULDER: Primary | ICD-10-CM

## 2019-05-28 DIAGNOSIS — M75.82 ROTATOR CUFF TENDONITIS, LEFT: ICD-10-CM

## 2019-05-28 PROCEDURE — G0283 ELEC STIM OTHER THAN WOUND: HCPCS

## 2019-05-28 PROCEDURE — 97110 THERAPEUTIC EXERCISES: CPT

## 2019-05-28 PROCEDURE — 97140 MANUAL THERAPY 1/> REGIONS: CPT

## 2019-05-28 NOTE — THERAPY TREATMENT NOTE
Outpatient Physical Therapy Ortho Treatment Note  SATHYA Abbott     Patient Name: Mary Jo Brizuela  : 1960  MRN: [id number]  Today's Date: 2019      Visit Date: 2019    Visit Dx:    ICD-10-CM ICD-9-CM   1. Bursitis of left shoulder M75.52 726.10   2. Rotator cuff tendonitis, left M75.82 726.10       Patient Active Problem List   Diagnosis   • Chest pain   • Avulsion fracture of distal fibula        Past Medical History:   Diagnosis Date   • Anemia    • Anxiety and depression    • Hyperlipidemia    • Obesity    • Seasonal allergies         Past Surgical History:   Procedure Laterality Date   • HYSTERECTOMY         PT Ortho     Row Name 19 3955       Subjective Comments    Subjective Comments  pt reports she took the boat out and at first was fine- then started doing HEP and thinks she overdid it.  Very sore today and limited all weekend.  Soreness in arm and under arm  -KM       Subjective Pain    Able to rate subjective pain?  yes  -KM    Subjective Pain Comment  pt reports increased pain today.  reports relief after ice/stim but  reports soreness with HEP  -KM      User Key  (r) = Recorded By, (t) = Taken By, (c) = Cosigned By    Initials Name Provider Type    Edith Gao PTA Physical Therapy Assistant                      PT Assessment/Plan     Row Name 19 4036          PT Assessment    Functional Limitations  Limitation in home management;Performance in work activities;Performance in sport activities  -KM     Impairments  Impaired flexibility;Muscle strength;Pain;Posture;Range of motion  -KM     Assessment Comments  -- pt reports better after ice/stim.  Encouraged her to limit activities and use ice as needed to help with pain.  -KM       User Key  (r) = Recorded By, (t) = Taken By, (c) = Cosigned By    Initials Name Provider Type    Edith Gao PTA Physical Therapy Assistant          Modalities     Row Name 19 0069             Moist Heat    MH Applied  Yes  -KM       Location  left shoulder  -KM      Rx Minutes  12 mins  -KM      MH Prior to Rx  Yes  -KM         Ice    Ice Applied  Yes  -KM      Location  left shoulder   -KM      Rx Minutes  15 mins  -KM      Ice Prior to Rx  No  -KM      Ice S/P Rx  Yes  -KM         ELECTRICAL STIMULATION    Attended/Unattended  Unattended  -KM      Stimulation Type  IFC with ice  -KM      Location/Electrode Placement/Other  left shoulder  -KM        User Key  (r) = Recorded By, (t) = Taken By, (c) = Cosigned By    Initials Name Provider Type     Edith Horn PTA Physical Therapy Assistant        Exercises     Row Name 05/28/19 1429 05/28/19 1145          Subjective Comments    Subjective Comments  --  pt reports she took the boat out and at first was fine- then started doing HEP and thinks she overdid it.  Very sore today and limited all weekend.  Soreness in arm and under arm  -KM        Subjective Pain    Able to rate subjective pain?  --  yes  -KM     Subjective Pain Comment  --  pt reports increased pain today.  reports relief after ice/stim but  reports soreness with HEP  -KM        Total Minutes    44463 - PT Manual Therapy Minutes  12  -KM  --        Exercise 1    Exercise Name 1  --  supine on foam roll (1/2 cylinder)  -KM     Cueing 1  --  Verbal  -KM     Time 1  --  5 min  -KM        Exercise 2    Exercise Name 2  --  UT stretch with hold to table  -KM     Cueing 2  --  Verbal;Demo  -KM     Reps 2  --  3  -KM     Time 2  --  15 sec  -KM        Exercise 3    Exercise Name 3  --  pec stretch with towel behind back  -KM     Cueing 3  --  Verbal;Demo  -KM     Reps 3  --  3  -KM     Time 3  --  15 sec  -KM        Exercise 4    Exercise Name 4  --  (B) shoulder ER with scapula retraction  -KM     Cueing 4  --  Verbal;Demo  -KM     Reps 4  --  20  -KM     Time 4  --  red  -KM     Additional Comments  --  cues  -KM        Exercise 5    Exercise Name 5  --  tband shoulder extension with scap retract  -KM     Cueing 5  --  Verbal;Demo   -KM     Reps 5  --  25  -KM     Time 5  --  green  -KM        Exercise 6    Exercise Name 6  --  tband rows  -KM     Cueing 6  --  Verbal;Demo  -KM     Reps 6  --  25  -KM     Time 6  --  green  -KM        Exercise 7    Exercise Name 7  --  median nerve glide  -KM     Reps 7  --  20 pulses x 2  -KM       User Key  (r) = Recorded By, (t) = Taken By, (c) = Cosigned By    Initials Name Provider Type    Edith Gao PTA Physical Therapy Assistant                      Manual Rx (last 36 hours)      Manual Treatments     Row Name 05/28/19 1429 05/28/19 1145          Total Minutes    24437 - PT Manual Therapy Minutes  12  -KM  --        Manual Rx 2    Manual Rx 2 Location  --  cervical spine  -KM     Manual Rx 2 Type  --  1st rib osc inferior mobilization, STM, vertebral glides and manual cervical traction per S ELDON Smith PT   -KM     Manual Rx 2 Duration  --  12 min  -KM       User Key  (r) = Recorded By, (t) = Taken By, (c) = Cosigned By    Initials Name Provider Type    Edith Gao PTA Physical Therapy Assistant          cues for posture with ex/activities.  With ER- cues to perform ex correctly.  Encouraged pt to not overdo it.    Therapy Education  Education Details: (encouraged pt to use ice as needed and to limit activities.  Encouraged her to not push too far with ex/activiites)  Given: HEP, Symptoms/condition management, Pain management  Program: Reinforced  How Provided: Verbal, Demonstration  Provided to: Patient              Time Calculation:   Start Time: 1145  Stop Time: 1255  Time Calculation (min): 70 min  Therapy Charges for Today     Code Description Service Date Service Provider Modifiers Qty    95771155023 HC PT MANUAL THERAPY EA 15 MIN 5/28/2019 Edith Horn PTA GP 1    46611444008 HC PT ELECTRICAL STIM UNATTENDED 5/28/2019 Edith Horn PTA  1    36496178350 HC PT HOT OR COLD PACK TREAT [facility] 5/28/2019 Edith Horn PTA GP 1    31173490102 HC PT THER PROC EA 15 MIN 5/28/2019  Kory, Edith LEE, PTA GP 1                    Edith Horn, PTA  5/28/2019

## 2019-05-30 ENCOUNTER — HOSPITAL ENCOUNTER (OUTPATIENT)
Dept: PHYSICAL THERAPY | Facility: HOSPITAL | Age: 59
Setting detail: THERAPIES SERIES
Discharge: HOME | End: 2019-05-30

## 2019-05-30 DIAGNOSIS — M75.52 BURSITIS OF LEFT SHOULDER: Primary | ICD-10-CM

## 2019-05-30 DIAGNOSIS — M75.82 ROTATOR CUFF TENDONITIS, LEFT: ICD-10-CM

## 2019-05-30 PROCEDURE — G0283 ELEC STIM OTHER THAN WOUND: HCPCS | Performed by: PHYSICAL THERAPIST

## 2019-05-30 PROCEDURE — 97110 THERAPEUTIC EXERCISES: CPT | Performed by: PHYSICAL THERAPIST

## 2019-05-30 NOTE — THERAPY TREATMENT NOTE
Outpatient Physical Therapy Ortho Treatment Note  SATHYA GustafsonRueter     Patient Name: Mary Jo Brizuela  : 1960  MRN: [id number]  Today's Date: 2019      Visit Date: 2019    Visit Dx:    ICD-10-CM ICD-9-CM   1. Bursitis of left shoulder M75.52 726.10   2. Rotator cuff tendonitis, left M75.82 726.10       Patient Active Problem List   Diagnosis   • Chest pain   • Avulsion fracture of distal fibula        Past Medical History:   Diagnosis Date   • Anemia    • Anxiety and depression    • Hyperlipidemia    • Obesity    • Seasonal allergies         Past Surgical History:   Procedure Laterality Date   • HYSTERECTOMY         PT Ortho     Row Name 19 1135       Subjective Comments    Subjective Comments  Patient reports that she felt better after last visit but was unable to sleep due to having a sick dog.  She feels tightness in anterior left cervical area to shoulder area.   -SP    Row Name 19 1145       Subjective Comments    Subjective Comments  pt reports she took the boat out and at first was fine- then started doing HEP and thinks she overdid it.  Very sore today and limited all weekend.  Soreness in arm and under arm  -KM       Subjective Pain    Able to rate subjective pain?  yes  -KM    Subjective Pain Comment  pt reports increased pain today.  reports relief after ice/stim but  reports soreness with HEP  -KM      User Key  (r) = Recorded By, (t) = Taken By, (c) = Cosigned By    Initials Name Provider Type    Edith Gao, PTA Physical Therapy Assistant    Emily Mae, PT Physical Therapist                      PT Assessment/Plan     Row Name 19 2766          PT Assessment    Assessment Comments  Patient with consistently improved posture with decreased elevated shoulders.   -SP        PT Plan    PT Plan Comments  Continue per POC  -SP       User Key  (r) = Recorded By, (t) = Taken By, (c) = Cosigned By    Initials Name Provider Type    Emily Mae  Carla, PT Physical Therapist          Modalities     Row Name 05/30/19 1135             Moist Heat    MH Applied  Yes  -SP      Location  left shoulder  -SP      Rx Minutes  12 mins  -SP      MH Prior to Rx  Yes  -SP         Ice    Ice Applied  Yes  -SP      Location  left shoulder   -SP      Rx Minutes  15 mins  -SP      Ice Prior to Rx  No  -SP      Ice S/P Rx  Yes  -SP         ELECTRICAL STIMULATION    Attended/Unattended  Unattended  -SP      Stimulation Type  IFC with ice  -SP      Location/Electrode Placement/Other  left shoulder  -SP        User Key  (r) = Recorded By, (t) = Taken By, (c) = Cosigned By    Initials Name Provider Type    Emily Mae, PT Physical Therapist        Exercises     Row Name 05/30/19 1135             Subjective Comments    Subjective Comments  Patient reports that she felt better after last visit but was unable to sleep due to having a sick dog.  She feels tightness in anterior left cervical area to shoulder area.   -SP         Exercise 1    Exercise Name 1  supine on foam roll (1/2 cylinder)  -SP      Cueing 1  Verbal  -SP      Time 1  5 min  -SP         Exercise 2    Exercise Name 2  UT stretch with hold to table  -SP      Cueing 2  Verbal;Demo  -SP      Reps 2  3  -SP      Time 2  15 sec  -SP         Exercise 3    Exercise Name 3  pec stretch with towel behind back  -SP      Cueing 3  Verbal;Demo  -SP      Reps 3  3  -SP      Time 3  15 sec  -SP         Exercise 4    Exercise Name 4  (B) shoulder ER with scapula retraction  -SP      Cueing 4  Verbal;Demo  -SP      Reps 4  20  -SP      Time 4  red  -SP         Exercise 5    Exercise Name 5  tband shoulder extension with scap retract  -SP      Cueing 5  Verbal;Demo  -SP      Reps 5  30  -SP      Time 5  green  -SP         Exercise 6    Exercise Name 6  tband rows  -SP      Cueing 6  Verbal;Demo  -SP      Reps 6  30  -SP      Time 6  green  -SP         Exercise 7    Exercise Name 7  median nerve glide  -SP      Reps  7  20 pulses x 2  -SP        User Key  (r) = Recorded By, (t) = Taken By, (c) = Cosigned By    Initials Name Provider Type    Emily Mae, COLLEEN Physical Therapist                      Manual Rx (last 36 hours)      Manual Treatments     Row Name 05/30/19 1135             Manual Rx 2    Manual Rx 2 Location  cervical spine  -SP      Manual Rx 2 Type  1st rib osc inferior mobilization, STM, vertebral glides and manual cervical traction per S J COLLEEN Smith   -SP      Manual Rx 2 Duration  12 min  -SP        User Key  (r) = Recorded By, (t) = Taken By, (c) = Cosigned By    Initials Name Provider Type    Emily Mae, PT Physical Therapist              Therapy Education  Given: HEP  Program: Reinforced  How Provided: Verbal  Provided to: Patient  Level of Understanding: Verbalized, Demonstrated              Time Calculation:   Start Time: 1135  Stop Time: 1248  Time Calculation (min): 73 min  Therapy Charges for Today     Code Description Service Date Service Provider Modifiers Qty    53074707732 HC PT ELECTRICAL STIM UNATTENDED 5/30/2019 Emily Smith, PT  1    84221703171 HC PT THER PROC EA 15 MIN 5/30/2019 Emily Smith, PT GP 1                    Emily Smith PT  5/30/2019

## 2019-06-04 ENCOUNTER — HOSPITAL ENCOUNTER (OUTPATIENT)
Dept: PHYSICAL THERAPY | Facility: HOSPITAL | Age: 59
Setting detail: THERAPIES SERIES
Discharge: HOME | End: 2019-06-04

## 2019-06-04 DIAGNOSIS — M75.82 ROTATOR CUFF TENDONITIS, LEFT: ICD-10-CM

## 2019-06-04 DIAGNOSIS — M75.52 BURSITIS OF LEFT SHOULDER: Primary | ICD-10-CM

## 2019-06-04 PROCEDURE — G0283 ELEC STIM OTHER THAN WOUND: HCPCS | Performed by: PHYSICAL THERAPIST

## 2019-06-04 PROCEDURE — 97110 THERAPEUTIC EXERCISES: CPT | Performed by: PHYSICAL THERAPIST

## 2019-06-04 NOTE — THERAPY TREATMENT NOTE
Outpatient Physical Therapy Ortho Treatment Note  SATHYA Abbott     Patient Name: Mary Jo Brizuela  : 1960  MRN: [id number]  Today's Date: 2019      Visit Date: 2019    Visit Dx:    ICD-10-CM ICD-9-CM   1. Bursitis of left shoulder M75.52 726.10   2. Rotator cuff tendonitis, left M75.82 726.10       Patient Active Problem List   Diagnosis   • Chest pain   • Avulsion fracture of distal fibula        Past Medical History:   Diagnosis Date   • Anemia    • Anxiety and depression    • Hyperlipidemia    • Obesity    • Seasonal allergies         Past Surgical History:   Procedure Laterality Date   • HYSTERECTOMY         PT Ortho     Row Name 19 1300       Subjective Comments    Subjective Comments  Patient reports that she is having right cervical area pain and tightness today.  She states she had to lift some boxes over the last couple of days and has been sore.   -SP      User Key  (r) = Recorded By, (t) = Taken By, (c) = Cosigned By    Initials Name Provider Type    Emily Mae, PT Physical Therapist                      PT Assessment/Plan     Row Name 19 1529          PT Assessment    Assessment Comments  Patient continues to present with improving posture.   Overall reports of decreasing cervical and left UE pain.  -SP        PT Plan    PT Plan Comments  Continue per POC  -SP       User Key  (r) = Recorded By, (t) = Taken By, (c) = Cosigned By    Initials Name Provider Type    Emily Mae, PT Physical Therapist          Modalities     Row Name 19 1300             Moist Heat    MH Applied  Yes  -SP      Location  left shoulder  -SP      Rx Minutes  12 mins  -SP      MH Prior to Rx  Yes  -SP         Ice    Ice Applied  Yes  -SP      Location  left shoulder   -SP      Rx Minutes  15 mins  -SP      Ice Prior to Rx  No  -SP      Ice S/P Rx  Yes  -SP         ELECTRICAL STIMULATION    Attended/Unattended  Unattended  -SP      Stimulation Type  IFC with ice   -SP      Location/Electrode Placement/Other  left shoulder  -SP        User Key  (r) = Recorded By, (t) = Taken By, (c) = Cosigned By    Initials Name Provider Type    Emily Mae, COLLEEN Physical Therapist        Exercises     Row Name 06/04/19 1300             Subjective Comments    Subjective Comments  Patient reports that she is having right cervical area pain and tightness today.  She states she had to lift some boxes over the last couple of days and has been sore.   -SP         Exercise 1    Exercise Name 1  supine on foam roll (1/2 cylinder)  -SP      Cueing 1  Verbal  -SP      Time 1  5 min  -SP         Exercise 2    Exercise Name 2  UT stretch with hold to table  -SP      Cueing 2  Verbal;Demo  -SP      Reps 2  3  -SP      Time 2  15 sec  -SP         Exercise 3    Exercise Name 3  pec stretch with towel behind back  -SP      Cueing 3  Verbal;Demo  -SP      Reps 3  3  -SP      Time 3  15 sec  -SP         Exercise 4    Exercise Name 4  (B) shoulder ER with scapula retraction  -SP      Cueing 4  Verbal;Demo  -SP      Reps 4  20  -SP      Time 4  red  -SP         Exercise 5    Exercise Name 5  tband shoulder extension with scap retract  -SP      Cueing 5  Verbal;Demo  -SP      Reps 5  30  -SP      Time 5  green  -SP         Exercise 6    Exercise Name 6  tband rows  -SP      Cueing 6  Verbal;Demo  -SP      Reps 6  30  -SP      Time 6  green  -SP         Exercise 7    Exercise Name 7  median nerve glide  -SP      Reps 7  20 pulses x 2  -SP        User Key  (r) = Recorded By, (t) = Taken By, (c) = Cosigned By    Initials Name Provider Type    Emily Mae, COLLEEN Physical Therapist                      Manual Rx (last 36 hours)      Manual Treatments     Row Name 06/04/19 1300             Manual Rx 2    Manual Rx 2 Location  cervical spine  -SP      Manual Rx 2 Type  1st rib osc inferior mobilization, STM, vertebral glides and manual cervical traction per S ELDON Smith PT   -SP      Manual  Rx 2 Duration  12 min  -SP        User Key  (r) = Recorded By, (t) = Taken By, (c) = Cosigned By    Initials Name Provider Type    SP Emily Smith, PT Physical Therapist              Therapy Education  Given: Posture/body mechanics  Program: New  How Provided: Verbal  Provided to: Patient  Level of Understanding: Verbalized, Demonstrated              Time Calculation:   Start Time: 1300  Stop Time: 1412  Time Calculation (min): 72 min  Therapy Charges for Today     Code Description Service Date Service Provider Modifiers Qty    06791409601 HC PT THER PROC EA 15 MIN 6/4/2019 Emily Smith, PT GP 1    78712110665 HC PT ELECTRICAL STIM UNATTENDED 6/4/2019 Emily Smith, PT  1                    Emily Smith, PT  6/4/2019

## 2019-06-11 ENCOUNTER — TRANSCRIBE ORDERS (OUTPATIENT)
Dept: ADMINISTRATIVE | Facility: HOSPITAL | Age: 59
End: 2019-06-11

## 2019-06-11 ENCOUNTER — APPOINTMENT (OUTPATIENT)
Dept: PHYSICAL THERAPY | Facility: HOSPITAL | Age: 59
End: 2019-06-11

## 2019-06-11 DIAGNOSIS — Z12.39 SCREENING BREAST EXAMINATION: Primary | ICD-10-CM

## 2019-06-18 ENCOUNTER — HOSPITAL ENCOUNTER (OUTPATIENT)
Dept: MAMMOGRAPHY | Facility: HOSPITAL | Age: 59
Discharge: HOME | End: 2019-06-18
Admitting: INTERNAL MEDICINE

## 2019-06-18 DIAGNOSIS — Z12.39 SCREENING BREAST EXAMINATION: ICD-10-CM

## 2019-06-18 PROCEDURE — 77067 SCR MAMMO BI INCL CAD: CPT

## 2019-06-18 PROCEDURE — 77063 BREAST TOMOSYNTHESIS BI: CPT

## 2019-07-02 ENCOUNTER — APPOINTMENT (OUTPATIENT)
Dept: PHYSICAL THERAPY | Facility: HOSPITAL | Age: 59
End: 2019-07-02

## 2020-04-20 DIAGNOSIS — S61.439A: Primary | ICD-10-CM

## 2020-04-20 DIAGNOSIS — B37.31 YEAST VAGINITIS: Primary | ICD-10-CM

## 2020-04-20 RX ORDER — CEPHALEXIN 500 MG/1
500 CAPSULE ORAL 2 TIMES DAILY
Qty: 10 CAPSULE | Refills: 0 | Status: SHIPPED | OUTPATIENT
Start: 2020-04-20 | End: 2020-04-25

## 2020-04-20 RX ORDER — FLUCONAZOLE 100 MG/1
150 TABLET ORAL DAILY
Qty: 30 TABLET | Refills: 0 | Status: SHIPPED | OUTPATIENT
Start: 2020-04-20 | End: 2020-04-22

## 2020-06-23 ENCOUNTER — TRANSCRIBE ORDERS (OUTPATIENT)
Dept: ADMINISTRATIVE | Facility: HOSPITAL | Age: 60
End: 2020-06-23

## 2020-06-23 DIAGNOSIS — Z12.31 SCREENING MAMMOGRAM, ENCOUNTER FOR: Primary | ICD-10-CM

## 2020-06-25 ENCOUNTER — HOSPITAL ENCOUNTER (OUTPATIENT)
Dept: MAMMOGRAPHY | Facility: HOSPITAL | Age: 60
Discharge: HOME | End: 2020-06-25
Admitting: INTERNAL MEDICINE

## 2020-06-25 DIAGNOSIS — Z12.31 SCREENING MAMMOGRAM, ENCOUNTER FOR: ICD-10-CM

## 2020-06-25 PROCEDURE — 77067 SCR MAMMO BI INCL CAD: CPT

## 2020-06-25 PROCEDURE — 77063 BREAST TOMOSYNTHESIS BI: CPT

## 2020-09-24 DIAGNOSIS — B37.31 YEAST VAGINITIS: ICD-10-CM

## 2020-09-29 RX ORDER — FLUCONAZOLE 100 MG/1
TABLET ORAL
Qty: 6 TABLET | Refills: 0 | OUTPATIENT
Start: 2020-09-29

## 2021-05-14 ENCOUNTER — TELEMEDICINE (OUTPATIENT)
Dept: INTERNAL MEDICINE | Facility: CLINIC | Age: 61
End: 2021-05-14

## 2021-05-14 DIAGNOSIS — F41.1 GAD (GENERALIZED ANXIETY DISORDER): Primary | ICD-10-CM

## 2021-05-14 DIAGNOSIS — F33.9 RECURRENT MAJOR DEPRESSIVE DISORDER, REMISSION STATUS UNSPECIFIED (HCC): ICD-10-CM

## 2021-05-14 PROCEDURE — 99214 OFFICE O/P EST MOD 30 MIN: CPT | Performed by: INTERNAL MEDICINE

## 2021-05-14 RX ORDER — CITALOPRAM 20 MG/1
20 TABLET ORAL DAILY
Qty: 90 TABLET | Refills: 2 | Status: SHIPPED | OUTPATIENT
Start: 2021-05-14 | End: 2022-01-20

## 2021-05-14 NOTE — PROGRESS NOTES
Chief Complaint  Anxiety    Subjective          Mary Jo Brizuela presents to [facility]  Here with increased anxiety recently, has been taking fluoxetine 40mg daily without side effects; mood has been low lately too; no thoughts of hurting self, others, suicide;     You have chosen to receive care through a telehealth visit.  Do you consent to use a video/audio connection for your medical care today? Yes    Objective   Vital Signs:   There were no vitals taken for this visit.    Physical Exam  Vitals and nursing note reviewed.   Constitutional:       Appearance: Normal appearance.   HENT:      Head: Normocephalic and atraumatic.      Right Ear: External ear normal.      Left Ear: External ear normal.      Nose: Nose normal.      Mouth/Throat:      Mouth: Mucous membranes are moist.      Pharynx: Oropharynx is clear.   Eyes:      Extraocular Movements: Extraocular movements intact.      Conjunctiva/sclera: Conjunctivae normal.   Pulmonary:      Effort: Pulmonary effort is normal. No respiratory distress.   Musculoskeletal:         General: Normal range of motion.      Cervical back: Normal range of motion.   Skin:     Findings: No rash.   Neurological:      General: No focal deficit present.      Mental Status: She is alert. Mental status is at baseline.   Psychiatric:         Mood and Affect: Mood normal.         Behavior: Behavior normal.         Thought Content: Thought content normal.         Judgment: Judgment normal.        Result Review :                 Assessment and Plan    Diagnoses and all orders for this visit:    1. RAHUL (generalized anxiety disorder) (Primary)  -     citalopram (CeleXA) 20 MG tablet; Take 1 tablet by mouth Daily.  Dispense: 90 tablet; Refill: 2    2. Recurrent major depressive disorder, remission status unspecified (CMS/HCC)  -     citalopram (CeleXA) 20 MG tablet; Take 1 tablet by mouth Daily.  Dispense: 90 tablet; Refill: 2    - counseled  on risks and benefits of above  - wean fluoxetine, discussed stopping this prior to starting citalopram  - continue outside time, exercise, therapy  - rtc 2 to 4 weeks, has physical scheduled    Follow Up   No follow-ups on file.  Patient was given instructions and counseling regarding her condition or for health maintenance advice. Please see specific information pulled into the AVS if appropriate.

## 2021-05-20 RX ORDER — FLUOXETINE HYDROCHLORIDE 20 MG/1
CAPSULE ORAL
Qty: 180 CAPSULE | Refills: 0 | Status: SHIPPED | OUTPATIENT
Start: 2021-05-20 | End: 2022-02-23

## 2021-05-20 NOTE — TELEPHONE ENCOUNTER
FLUoxetine (PROzac) 20 MG capsule     Sig: TAKE TWO CAPSULES BY MOUTH DAILY    Disp:  180 capsule    Refills:  0     Requesting refill on above medication

## 2021-06-11 ENCOUNTER — TELEPHONE (OUTPATIENT)
Dept: INTERNAL MEDICINE | Facility: CLINIC | Age: 61
End: 2021-06-11

## 2021-06-11 NOTE — TELEPHONE ENCOUNTER
Caller: Mary Jo Brizuela    Relationship to patient: Self    Best call back number: [phone]    Chief complaint: PHYSICAL    Type of visit: PHYSICAL    Requested date: LAST WEEK OF MADELEINE     If rescheduling, when is the original appointment: 7/26 AT 1:45    Additional notes: PATIENT STATES FOR INSURANCE PURPOSES SHE NEEDS THIS DONE BEFORE July 1. SHE ALSO STATES SHE NEEDS A MAMMOGRAM SCHEDULED FOR THE SAME DAY AS HER PHYSICAL.

## 2021-07-27 ENCOUNTER — TELEPHONE (OUTPATIENT)
Dept: INTERNAL MEDICINE | Facility: CLINIC | Age: 61
End: 2021-07-27

## 2021-11-16 ENCOUNTER — OFFICE VISIT (OUTPATIENT)
Dept: FAMILY MEDICINE CLINIC | Facility: CLINIC | Age: 61
End: 2021-11-16

## 2021-11-16 VITALS
TEMPERATURE: 98.2 F | WEIGHT: 200 LBS | DIASTOLIC BLOOD PRESSURE: 74 MMHG | HEART RATE: 92 BPM | BODY MASS INDEX: 35.44 KG/M2 | RESPIRATION RATE: 16 BRPM | HEIGHT: 63 IN | SYSTOLIC BLOOD PRESSURE: 132 MMHG | OXYGEN SATURATION: 98 %

## 2021-11-16 DIAGNOSIS — J01.00 ACUTE NON-RECURRENT MAXILLARY SINUSITIS: Primary | ICD-10-CM

## 2021-11-16 DIAGNOSIS — Z11.52 ENCOUNTER FOR SCREENING FOR COVID-19: ICD-10-CM

## 2021-11-16 PROCEDURE — 99213 OFFICE O/P EST LOW 20 MIN: CPT | Performed by: NURSE PRACTITIONER

## 2021-11-16 RX ORDER — MONTELUKAST SODIUM 10 MG/1
10 TABLET ORAL NIGHTLY
Qty: 90 TABLET | Refills: 0 | Status: SHIPPED | OUTPATIENT
Start: 2021-11-16 | End: 2022-04-28 | Stop reason: SDUPTHER

## 2021-11-16 RX ORDER — AZELASTINE 1 MG/ML
1 SPRAY, METERED NASAL DAILY
Qty: 30 ML | Refills: 0 | Status: SHIPPED | OUTPATIENT
Start: 2021-11-16 | End: 2022-12-08 | Stop reason: SDUPTHER

## 2021-11-16 RX ORDER — FLUCONAZOLE 150 MG/1
TABLET ORAL
Qty: 2 TABLET | Refills: 0 | Status: SHIPPED | OUTPATIENT
Start: 2021-11-16 | End: 2021-12-14

## 2021-11-16 RX ORDER — DOXYCYCLINE HYCLATE 100 MG/1
100 CAPSULE ORAL 2 TIMES DAILY
Qty: 14 CAPSULE | Refills: 0 | Status: SHIPPED | OUTPATIENT
Start: 2021-11-16 | End: 2021-11-23

## 2021-11-16 RX ORDER — METHYLPREDNISOLONE 4 MG/1
TABLET ORAL
Qty: 21 EACH | Refills: 0 | Status: SHIPPED | OUTPATIENT
Start: 2021-11-16 | End: 2021-12-14

## 2021-11-16 NOTE — PROGRESS NOTES
"Chief Complaint   Patient presents with   • Cough       Upper Respiratory Infection: Patient complains of symptoms of a URI. Symptoms include bilateral ear pain, congestion, cough, sore throat and headcahe and dizziness. Onset of symptoms was 9 days ago, gradually worsening since that time. She also c/o achiness, congestion, no  fever, productive cough with  green colored sputum and sinus pressure for the past 9 days .  She is drinking moderate amounts of fluids. Evaluation to date: none. Treatment to date: antihistamines and decongestants.  Ill contacts at home or school or work discussed.  Son-in-law and daughter with similar symptoms.    Vaccinated for Covid 7/20/2021 and 8/10/2021    The following portions of the patient's history were reviewed and updated as appropriate: allergies, current medications, past family history, past medical history, past social history, past surgical history and problem list.    Vitals:    11/16/21 1148   BP: 132/74   Pulse: 92   Resp: 16   Temp: 98.2 °F (36.8 °C)   SpO2: 98%   Weight: 90.7 kg (200 lb)   Height: 160 cm (63\")     Gen: Mildly ill appearing, alert  Head:  Frontal and maxillary sinus tenderness  Ears: TM's bulging without redness  Nose:  Congestion  Throat:  Red without exudate, some drainage  Neck: No LAD  Lung: Good air movement, regular RR  Heart: RR without murmur  Skin: No rash      Assessment/Plan   Diagnoses and all orders for this visit:    1. Acute non-recurrent maxillary sinusitis (Primary)    2. Encounter for screening for COVID-19  -     COVID-19,[facility] ROUTINE, NP/OP SWAB IN TRANSPORT MEDIA OR ESWAB 72 HR TAT - Swab, Nasopharynx    Other orders  -     azelastine (ASTELIN) 0.1 % nasal spray; 1 spray into the nostril(s) as directed by provider Daily. Use in each nostril as directed  Dispense: 30 mL; Refill: 0  -     montelukast (Singulair) 10 MG tablet; Take 1 tablet by mouth Every Night.  Dispense: 90 tablet; Refill: 0  -     doxycycline (VIBRAMYCIN) 100 MG " capsule; Take 1 capsule by mouth 2 (Two) Times a Day for 7 days.  Dispense: 14 capsule; Refill: 0  -     fluconazole (Diflucan) 150 MG tablet; Take X 1 dose now.  May repeat after 72 hours if no improvement of symptoms.  Dispense: 2 tablet; Refill: 0  -     methylPREDNISolone (MEDROL) 4 MG dose pack; Take as directed on package instructions.  Dispense: 21 each; Refill: 0           Patient Instructions   Sinusitis, Adult  Sinusitis is soreness and swelling (inflammation) of your sinuses. Sinuses are hollow spaces in the bones around your face. They are located:  · Around your eyes.  · In the middle of your forehead.  · Behind your nose.  · In your cheekbones.  Your sinuses and nasal passages are lined with a fluid called mucus. Mucus drains out of your sinuses. Swelling can trap mucus in your sinuses. This lets germs (bacteria, virus, or fungus) grow, which leads to infection. Most of the time, this condition is caused by a virus.  What are the causes?  This condition is caused by:  · Allergies.  · Asthma.  · Germs.  · Things that block your nose or sinuses.  · Growths in the nose (nasal polyps).  · Chemicals or irritants in the air.  · Fungus (rare).  What increases the risk?  You are more likely to develop this condition if:  · You have a weak body defense system (immune system).  · You do a lot of swimming or diving.  · You use nasal sprays too much.  · You smoke.  What are the signs or symptoms?  The main symptoms of this condition are pain and a feeling of pressure around the sinuses. Other symptoms include:  · Stuffy nose (congestion).  · Runny nose (drainage).  · Swelling and warmth in the sinuses.  · Headache.  · Toothache.  · A cough that may get worse at night.  · Mucus that collects in the throat or the back of the nose (postnasal drip).  · Being unable to smell and taste.  · Being very tired (fatigue).  · A fever.  · Sore throat.  · Bad breath.  How is this diagnosed?  This condition is diagnosed based  on:  · Your symptoms.  · Your medical history.  · A physical exam.  · Tests to find out if your condition is short-term (acute) or long-term (chronic). Your doctor may:  ? Check your nose for growths (polyps).  ? Check your sinuses using a tool that has a light (endoscope).  ? Check for allergies or germs.  ? Do imaging tests, such as an MRI or CT scan.  How is this treated?  Treatment for this condition depends on the cause and whether it is short-term or long-term.  · If caused by a virus, your symptoms should go away on their own within 10 days. You may be given medicines to relieve symptoms. They include:  ? Medicines that shrink swollen tissue in the nose.  ? Medicines that treat allergies (antihistamines).  ? A spray that treats swelling of the nostrils.   ? Rinses that help get rid of thick mucus in your nose (nasal saline washes).  · If caused by bacteria, your doctor may wait to see if you will get better without treatment. You may be given antibiotic medicine if you have:  ? A very bad infection.  ? A weak body defense system.  · If caused by growths in the nose, you may need to have surgery.  Follow these instructions at home:  Medicines  · Take, use, or apply over-the-counter and prescription medicines only as told by your doctor. These may include nasal sprays.  · If you were prescribed an antibiotic medicine, take it as told by your doctor. Do not stop taking the antibiotic even if you start to feel better.  Hydrate and humidify    · Drink enough water to keep your pee (urine) pale yellow.  · Use a cool mist humidifier to keep the humidity level in your home above 50%.  · Breathe in steam for 10-15 minutes, 3-4 times a day, or as told by your doctor. You can do this in the bathroom while a hot shower is running.  · Try not to spend time in cool or dry air.    Rest  · Rest as much as you can.  · Sleep with your head raised (elevated).  · Make sure you get enough sleep each night.  General  instructions    · Put a warm, moist washcloth on your face 3-4 times a day, or as often as told by your doctor. This will help with discomfort.  · Wash your hands often with soap and water. If there is no soap and water, use hand .  · Do not smoke. Avoid being around people who are smoking (secondhand smoke).  · Keep all follow-up visits as told by your doctor. This is important.    Contact a doctor if:  · You have a fever.  · Your symptoms get worse.  · Your symptoms do not get better within 10 days.  Get help right away if:  · You have a very bad headache.  · You cannot stop throwing up (vomiting).  · You have very bad pain or swelling around your face or eyes.  · You have trouble seeing.  · You feel confused.  · Your neck is stiff.  · You have trouble breathing.  Summary  · Sinusitis is swelling of your sinuses. Sinuses are hollow spaces in the bones around your face.  · This condition is caused by tissues in your nose that become inflamed or swollen. This traps germs. These can lead to infection.  · If you were prescribed an antibiotic medicine, take it as told by your doctor. Do not stop taking it even if you start to feel better.  · Keep all follow-up visits as told by your doctor. This is important.  This information is not intended to replace advice given to you by your health care provider. Make sure you discuss any questions you have with your health care provider.  Document Revised: 05/20/2019 Document Reviewed: 05/20/2019  [employer] Patient Education © 2021 [employer]    Symptoms of COVID-19  Watch for symptoms  People with COVID-19 have had a wide range of symptoms reported - ranging from mild symptoms to severe illness. Symptoms may appear 2-14 days after exposure to the virus. Anyone can have mild to severe symptoms. People with these symptoms may have COVID-19:  · Fever or chills  · Cough  · Shortness of breath or difficulty breathing  · Fatigue  · Muscle or body aches  · Headache  · New  loss of taste or smell  · Sore throat  · Congestion or runny nose  · Nausea or vomiting  · Diarrhea  This list does not include all possible symptoms. CDC will continue to update this list as we learn more about COVID-19. Older adults and people who have severe underlying medical conditions like heart or lung disease or diabetes seem to be at higher risk for developing more serious complications from COVID-19 illness.  When to seek emergency medical attention  Look for emergency warning signs* for COVID-19. If someone is showing any of these signs, seek emergency medical care immediately:  · Trouble breathing  · Persistent pain or pressure in the chest  · New confusion  · Inability to wake or stay awake  · Pale, gray, or blue-colored skin, lips, or nail beds, depending on skin tone  *This list is not all possible symptoms. Please call your medical provider for any other symptoms that are severe or concerning to you.   Call 911 or call ahead to your local emergency facility: Notify the  that you are seeking care for someone who has or may have COVID-19.  If you are sick  · Check symptoms with a Coronavirus Self-  · Get tested  · What to do if you are sick  · Isolate if you are sick  · When to quarantine  · How to care for someone who is sick  Difference between COVID-19 & flu  Influenza (Flu) and COVID-19 are both contagious respiratory illnesses, but they are caused by different viruses. COVID-19 is caused by infection with a new coronavirus (called SARS-CoV-2), and flu is caused by infection with influenza viruses.   COVID-19 seems to spread more easily than flu and causes more serious illnesses in some people. It can also take longer before people show symptoms and people can be contagious for longer. More information about differences between flu and COVID-19 is available in the different sections below.   Because some of the symptoms of flu and COVID-19 are similar, it may be hard to tell the  difference between them based on symptoms alone, and testing may be needed to help confirm a diagnosis.  While more is learned every day about COVID-19 and the virus that causes it, there is still a lot that is unknown . This page compares COVID-19 and flu, given the best available information to date.  Centers for Disease Control and Prevention  Content source: National Center for Immunization and Respiratory Diseases (NCIRD), Division of Viral Diseases  02/22/2021  This information is not intended to replace advice given to you by your health care provider. Make sure you discuss any questions you have with your health care provider.  Document Revised: 07/14/2021 Document Reviewed: 07/14/2021  [employer] Patient Education © 2021 [employer]        Tylenol or Advil as needed for pain, fever, muscle aches  Plenty of fluids  Hand washing discussed  Off work or school note given if needed.  Warm tea for throat.  Pros and cons of antibiotic use discussed.  Instructed to notify us if symptoms worsen or do not improve.        Patient was wearing face mask when I entered the room and throughout our encounter. Protective equipment was worn throughout this patient encounter including a face mask, eye protection, and gloves. Hand hygiene was performed before donning protective equipment and after removal when leaving the room.     PHILIP Morgan  Family Practice  [facility] [place]

## 2021-11-16 NOTE — PATIENT INSTRUCTIONS
Sinusitis, Adult  Sinusitis is soreness and swelling (inflammation) of your sinuses. Sinuses are hollow spaces in the bones around your face. They are located:  · Around your eyes.  · In the middle of your forehead.  · Behind your nose.  · In your cheekbones.  Your sinuses and nasal passages are lined with a fluid called mucus. Mucus drains out of your sinuses. Swelling can trap mucus in your sinuses. This lets germs (bacteria, virus, or fungus) grow, which leads to infection. Most of the time, this condition is caused by a virus.  What are the causes?  This condition is caused by:  · Allergies.  · Asthma.  · Germs.  · Things that block your nose or sinuses.  · Growths in the nose (nasal polyps).  · Chemicals or irritants in the air.  · Fungus (rare).  What increases the risk?  You are more likely to develop this condition if:  · You have a weak body defense system (immune system).  · You do a lot of swimming or diving.  · You use nasal sprays too much.  · You smoke.  What are the signs or symptoms?  The main symptoms of this condition are pain and a feeling of pressure around the sinuses. Other symptoms include:  · Stuffy nose (congestion).  · Runny nose (drainage).  · Swelling and warmth in the sinuses.  · Headache.  · Toothache.  · A cough that may get worse at night.  · Mucus that collects in the throat or the back of the nose (postnasal drip).  · Being unable to smell and taste.  · Being very tired (fatigue).  · A fever.  · Sore throat.  · Bad breath.  How is this diagnosed?  This condition is diagnosed based on:  · Your symptoms.  · Your medical history.  · A physical exam.  · Tests to find out if your condition is short-term (acute) or long-term (chronic). Your doctor may:  ? Check your nose for growths (polyps).  ? Check your sinuses using a tool that has a light (endoscope).  ? Check for allergies or germs.  ? Do imaging tests, such as an MRI or CT scan.  How is this treated?  Treatment for this condition  depends on the cause and whether it is short-term or long-term.  · If caused by a virus, your symptoms should go away on their own within 10 days. You may be given medicines to relieve symptoms. They include:  ? Medicines that shrink swollen tissue in the nose.  ? Medicines that treat allergies (antihistamines).  ? A spray that treats swelling of the nostrils.   ? Rinses that help get rid of thick mucus in your nose (nasal saline washes).  · If caused by bacteria, your doctor may wait to see if you will get better without treatment. You may be given antibiotic medicine if you have:  ? A very bad infection.  ? A weak body defense system.  · If caused by growths in the nose, you may need to have surgery.  Follow these instructions at home:  Medicines  · Take, use, or apply over-the-counter and prescription medicines only as told by your doctor. These may include nasal sprays.  · If you were prescribed an antibiotic medicine, take it as told by your doctor. Do not stop taking the antibiotic even if you start to feel better.  Hydrate and humidify    · Drink enough water to keep your pee (urine) pale yellow.  · Use a cool mist humidifier to keep the humidity level in your home above 50%.  · Breathe in steam for 10-15 minutes, 3-4 times a day, or as told by your doctor. You can do this in the bathroom while a hot shower is running.  · Try not to spend time in cool or dry air.    Rest  · Rest as much as you can.  · Sleep with your head raised (elevated).  · Make sure you get enough sleep each night.  General instructions    · Put a warm, moist washcloth on your face 3-4 times a day, or as often as told by your doctor. This will help with discomfort.  · Wash your hands often with soap and water. If there is no soap and water, use hand .  · Do not smoke. Avoid being around people who are smoking (secondhand smoke).  · Keep all follow-up visits as told by your doctor. This is important.    Contact a doctor if:  · You  have a fever.  · Your symptoms get worse.  · Your symptoms do not get better within 10 days.  Get help right away if:  · You have a very bad headache.  · You cannot stop throwing up (vomiting).  · You have very bad pain or swelling around your face or eyes.  · You have trouble seeing.  · You feel confused.  · Your neck is stiff.  · You have trouble breathing.  Summary  · Sinusitis is swelling of your sinuses. Sinuses are hollow spaces in the bones around your face.  · This condition is caused by tissues in your nose that become inflamed or swollen. This traps germs. These can lead to infection.  · If you were prescribed an antibiotic medicine, take it as told by your doctor. Do not stop taking it even if you start to feel better.  · Keep all follow-up visits as told by your doctor. This is important.  This information is not intended to replace advice given to you by your health care provider. Make sure you discuss any questions you have with your health care provider.  Document Revised: 05/20/2019 Document Reviewed: 05/20/2019  [employer] Patient Education © 2021 [employer] Inc.    Symptoms of COVID-19  Watch for symptoms  People with COVID-19 have had a wide range of symptoms reported - ranging from mild symptoms to severe illness. Symptoms may appear 2-14 days after exposure to the virus. Anyone can have mild to severe symptoms. People with these symptoms may have COVID-19:  · Fever or chills  · Cough  · Shortness of breath or difficulty breathing  · Fatigue  · Muscle or body aches  · Headache  · New loss of taste or smell  · Sore throat  · Congestion or runny nose  · Nausea or vomiting  · Diarrhea  This list does not include all possible symptoms. CDC will continue to update this list as we learn more about COVID-19. Older adults and people who have severe underlying medical conditions like heart or lung disease or diabetes seem to be at higher risk for developing more serious complications from COVID-19  illness.  When to seek emergency medical attention  Look for emergency warning signs* for COVID-19. If someone is showing any of these signs, seek emergency medical care immediately:  · Trouble breathing  · Persistent pain or pressure in the chest  · New confusion  · Inability to wake or stay awake  · Pale, gray, or blue-colored skin, lips, or nail beds, depending on skin tone  *This list is not all possible symptoms. Please call your medical provider for any other symptoms that are severe or concerning to you.   Call 911 or call ahead to your local emergency facility: Notify the  that you are seeking care for someone who has or may have COVID-19.  If you are sick  · Check symptoms with a Coronavirus Self-  · Get tested  · What to do if you are sick  · Isolate if you are sick  · When to quarantine  · How to care for someone who is sick  Difference between COVID-19 & flu  Influenza (Flu) and COVID-19 are both contagious respiratory illnesses, but they are caused by different viruses. COVID-19 is caused by infection with a new coronavirus (called SARS-CoV-2), and flu is caused by infection with influenza viruses.   COVID-19 seems to spread more easily than flu and causes more serious illnesses in some people. It can also take longer before people show symptoms and people can be contagious for longer. More information about differences between flu and COVID-19 is available in the different sections below.   Because some of the symptoms of flu and COVID-19 are similar, it may be hard to tell the difference between them based on symptoms alone, and testing may be needed to help confirm a diagnosis.  While more is learned every day about COVID-19 and the virus that causes it, there is still a lot that is unknown . This page compares COVID-19 and flu, given the best available information to date.  Centers for Disease Control and Prevention  Content source: National Center for Immunization and Respiratory  Diseases (NCIRD), Division of Viral Diseases  02/22/2021  This information is not intended to replace advice given to you by your health care provider. Make sure you discuss any questions you have with your health care provider.  Document Revised: 07/14/2021 Document Reviewed: 07/14/2021  [employer] Patient Education © 2021 Elsevier Inc.

## 2021-11-17 LAB
LABCORP SARS-COV-2, NAA 2 DAY TAT: NORMAL
SARS-COV-2 RNA RESP QL NAA+PROBE: NOT DETECTED

## 2021-11-23 DIAGNOSIS — J06.9 URI WITH COUGH AND CONGESTION: Primary | ICD-10-CM

## 2021-11-23 RX ORDER — AZITHROMYCIN 250 MG/1
TABLET, FILM COATED ORAL
Qty: 6 TABLET | Refills: 0 | Status: SHIPPED | OUTPATIENT
Start: 2021-11-23 | End: 2021-12-14

## 2021-12-08 ENCOUNTER — TELEPHONE (OUTPATIENT)
Dept: INTERNAL MEDICINE | Facility: CLINIC | Age: 61
End: 2021-12-08

## 2021-12-08 NOTE — TELEPHONE ENCOUNTER
Caller: Mary Jo Brizuela    Relationship: Self    Best call back number: [phone]    What orders are you requesting (i.e. lab or imaging): EARS NOSE AND THROAT DR    In what timeframe would the patient need to come in: AS SOON AS POSSIBLE    Where will you receive your lab/imaging services: DR [facility] [phone] OR  [facility] [place] [phone]    Additional notes: IF THEY ARE NOT ABLE TO TREAT EARS, NOSE AND THROAT THEN PATIENT WOULD LIKE TO HAVE A DOCTOR THAT CAN TREAT FOR HER CHRONIC SINUS INFECTIONS.

## 2021-12-09 DIAGNOSIS — J32.9 CHRONIC SINUSITIS, UNSPECIFIED LOCATION: Primary | ICD-10-CM

## 2021-12-14 ENCOUNTER — OFFICE VISIT (OUTPATIENT)
Dept: FAMILY MEDICINE CLINIC | Facility: CLINIC | Age: 61
End: 2021-12-14

## 2021-12-14 ENCOUNTER — HOSPITAL ENCOUNTER (OUTPATIENT)
Dept: GENERAL RADIOLOGY | Facility: HOSPITAL | Age: 61
Discharge: HOME | End: 2021-12-14
Admitting: NURSE PRACTITIONER

## 2021-12-14 VITALS
SYSTOLIC BLOOD PRESSURE: 120 MMHG | RESPIRATION RATE: 16 BRPM | TEMPERATURE: 97.7 F | HEIGHT: 63 IN | BODY MASS INDEX: 35.43 KG/M2 | OXYGEN SATURATION: 97 % | HEART RATE: 102 BPM | DIASTOLIC BLOOD PRESSURE: 74 MMHG

## 2021-12-14 DIAGNOSIS — J06.9 URI WITH COUGH AND CONGESTION: Primary | ICD-10-CM

## 2021-12-14 PROCEDURE — 99213 OFFICE O/P EST LOW 20 MIN: CPT | Performed by: NURSE PRACTITIONER

## 2021-12-14 PROCEDURE — 71046 X-RAY EXAM CHEST 2 VIEWS: CPT

## 2021-12-14 RX ORDER — FLUCONAZOLE 150 MG/1
150 TABLET ORAL ONCE
Qty: 1 TABLET | Refills: 0 | Status: SHIPPED | OUTPATIENT
Start: 2021-12-14 | End: 2021-12-14

## 2021-12-14 RX ORDER — METHYLPREDNISOLONE 4 MG/1
TABLET ORAL
Qty: 21 EACH | Refills: 0 | Status: SHIPPED | OUTPATIENT
Start: 2021-12-14 | End: 2022-06-10

## 2021-12-14 RX ORDER — ALBUTEROL SULFATE 90 UG/1
2 AEROSOL, METERED RESPIRATORY (INHALATION) EVERY 4 HOURS PRN
Qty: 18 G | Refills: 0 | Status: SHIPPED | OUTPATIENT
Start: 2021-12-14 | End: 2022-01-20 | Stop reason: SDUPTHER

## 2021-12-14 RX ORDER — AMOXICILLIN AND CLAVULANATE POTASSIUM 875; 125 MG/1; MG/1
1 TABLET, FILM COATED ORAL EVERY 12 HOURS SCHEDULED
Qty: 14 TABLET | Refills: 0 | Status: SHIPPED | OUTPATIENT
Start: 2021-12-14 | End: 2021-12-21

## 2021-12-14 RX ORDER — BENZONATATE 100 MG/1
CAPSULE ORAL
Qty: 30 CAPSULE | Refills: 0 | Status: SHIPPED | OUTPATIENT
Start: 2021-12-14

## 2021-12-14 NOTE — PROGRESS NOTES
"Chief Complaint   Patient presents with   • Sinusitis   • Cough   • Headache   • Sore Throat       Upper Respiratory Infection: Patient complains of symptoms of a URI. Symptoms include congestion, cough, sore throat and headache. Onset of symptoms was 1 month ago, unchanged since that time. She also c/o congestion, no  fever, productive cough with  green colored sputum, shortness of breath and sinus pressure for the past 1 month .  She is drinking moderate amounts of fluids. Evaluation to date: Seen and treated for sinusitis. Treatment to date: antibiotics, antihistamines, cough suppressants and decongestants.  Ill contacts at home or school or work discussed.  Seen for same symptoms by me on 11/16/2021.  Diagnosed with acute sinusitis.    Has been treated with doxycycline and Zpack along with Medrol dose pack.    Tested for Covid on 11/16/2021 and was negative.  Tested at home for Covid on Sunday which was negative.       Administered On    COVID-19 mRNA (PFR) (COVID-19 (PFIZER)) 8/10/2021, 7/20/2021        The following portions of the patient's history were reviewed and updated as appropriate: allergies, current medications, past family history, past medical history, past social history, past surgical history and problem list.      Vitals:    12/14/21 1619   BP: 120/74   BP Location: Left arm   Patient Position: Sitting   Cuff Size: Adult   Pulse: 102   Resp: 16   Temp: 97.7 °F (36.5 °C)   SpO2: 97%   Height: 160 cm (63\")     Gen: Mildly ill appearing, alert, coughing in exam room  Head:  Maxillary sinus tenderness  Ears: TM's bulging without redness  Nose:  Congestion  Throat:  Red without exudate, some drainage  Neck: No LAD  Lung: Mild rales, good air movement, regular RR  Heart: RR without murmur  Skin: No rash    Assessment/Plan   Diagnoses and all orders for this visit:    1. URI with cough and congestion (Primary)  -     XR Chest PA & Lateral  -     albuterol sulfate  (90 Base) MCG/ACT inhaler; " "Inhale 2 puffs Every 4 (Four) Hours As Needed for Wheezing or Shortness of Air.  Dispense: 18 g; Refill: 0  -     methylPREDNISolone (MEDROL) 4 MG dose pack; Take as directed on package instructions.  Dispense: 21 each; Refill: 0  -     amoxicillin-clavulanate (Augmentin) 875-125 MG per tablet; Take 1 tablet by mouth Every 12 (Twelve) Hours for 7 days.  Dispense: 14 tablet; Refill: 0  -     benzonatate (Tessalon Perles) 100 MG capsule; Take 1-2 capsules every 8 hours as needed for cough  Dispense: 30 capsule; Refill: 0    Other orders  -     fluconazole (Diflucan) 150 MG tablet; Take 1 tablet by mouth 1 (One) Time for 1 dose.  Dispense: 1 tablet; Refill: 0           Patient Instructions   Upper Respiratory Infection, Adult  An upper respiratory infection (URI) affects the nose, throat, and upper air passages. URIs are caused by germs (viruses). The most common type of URI is often called \"the common cold.\"  Medicines cannot cure URIs, but you can do things at home to relieve your symptoms. URIs usually get better within 7-10 days.  Follow these instructions at home:  Activity  · Rest as needed.  · If you have a fever, stay home from work or school until your fever is gone, or until your doctor says you may return to work or school.  ? You should stay home until you cannot spread the infection anymore (you are not contagious).  ? Your doctor may have you wear a face mask so you have less risk of spreading the infection.  Relieving symptoms  · Gargle with a salt-water mixture 3-4 times a day or as needed. To make a salt-water mixture, completely dissolve ½-1 tsp of salt in 1 cup of warm water.  · Use a cool-mist humidifier to add moisture to the air. This can help you breathe more easily.  Eating and drinking    · Drink enough fluid to keep your pee (urine) pale yellow.  · Eat soups and other clear broths.    General instructions    · Take over-the-counter and prescription medicines only as told by your doctor. These " "include cold medicines, fever reducers, and cough suppressants.  · Do not use any products that contain nicotine or tobacco. These include cigarettes and e-cigarettes. If you need help quitting, ask your doctor.  · Avoid being where people are smoking (avoid secondhand smoke).  · Make sure you get regular shots and get the flu shot every year.  · Keep all follow-up visits as told by your doctor. This is important.    How to avoid spreading infection to others    · Wash your hands often with soap and water. If you do not have soap and water, use hand .  · Avoid touching your mouth, face, eyes, or nose.  · Cough or sneeze into a tissue or your sleeve or elbow. Do not cough or sneeze into your hand or into the air.    Contact a doctor if:  · You are getting worse, not better.  · You have any of these:  ? A fever.  ? Chills.  ? Brown or red mucus in your nose.  ? Yellow or brown fluid (discharge)coming from your nose.  ? Pain in your face, especially when you bend forward.  ? Swollen neck glands.  ? Pain with swallowing.  ? White areas in the back of your throat.  Get help right away if:  · You have shortness of breath that gets worse.  · You have very bad or constant:  ? Headache.  ? Ear pain.  ? Pain in your forehead, behind your eyes, and over your cheekbones (sinus pain).  ? Chest pain.  · You have long-lasting (chronic) lung disease along with any of these:  ? Wheezing.  ? Long-lasting cough.  ? Coughing up blood.  ? A change in your usual mucus.  · You have a stiff neck.  · You have changes in your:  ? Vision.  ? Hearing.  ? Thinking.  ? Mood.  Summary  · An upper respiratory infection (URI) is caused by a germ called a virus. The most common type of URI is often called \"the common cold.\"  · URIs usually get better within 7-10 days.  · Take over-the-counter and prescription medicines only as told by your doctor.  This information is not intended to replace advice given to you by your health care provider. " Make sure you discuss any questions you have with your health care provider.  Document Revised: 12/26/2019 Document Reviewed: 08/10/2018  [employer] Patient Education © 2021 [employer]        Tylenol or Advil as needed for pain, fever, muscle aches  Plenty of fluids  Hand washing discussed  Off work or school note given if needed.  Warm tea for throat.  Pros and cons of antibiotic use discussed.  Instructed to notify us if symptoms worsen or do not improve.        Patient was wearing face mask when I entered the room and throughout our encounter. Protective equipment was worn throughout this patient encounter including a face mask, eye protection, and gloves. Hand hygiene was performed before donning protective equipment and after removal when leaving the room.     Amparo Headley, PHILIP  Family Practice  [facility] [place]

## 2021-12-16 NOTE — PROGRESS NOTES
Notify Mary Jo Brizuela CXR negative.  No signs of pneumonia.  Continue with current recommendations and let us know if no improvement.

## 2022-01-03 ENCOUNTER — TELEPHONE (OUTPATIENT)
Dept: INTERNAL MEDICINE | Facility: CLINIC | Age: 62
End: 2022-01-03

## 2022-01-03 DIAGNOSIS — J06.9 URI WITH COUGH AND CONGESTION: ICD-10-CM

## 2022-01-03 RX ORDER — BENZONATATE 100 MG/1
CAPSULE ORAL
Qty: 30 CAPSULE | Refills: 0 | OUTPATIENT
Start: 2022-01-03

## 2022-01-03 RX ORDER — METHYLPREDNISOLONE 4 MG/1
TABLET ORAL
Qty: 21 EACH | Refills: 0 | OUTPATIENT
Start: 2022-01-03

## 2022-01-03 RX ORDER — ALBUTEROL SULFATE 90 UG/1
2 AEROSOL, METERED RESPIRATORY (INHALATION) EVERY 4 HOURS PRN
Qty: 18 G | Refills: 0 | OUTPATIENT
Start: 2022-01-03

## 2022-01-03 NOTE — TELEPHONE ENCOUNTER
Caller: Mary Jo Brizuela    Relationship: Self    Best call back number:[phone]  Requested Prescriptions:   Requested Prescriptions     Pending Prescriptions Disp Refills   • benzonatate (Tessalon Perles) 100 MG capsule 30 capsule 0     Sig: Take 1-2 capsules every 8 hours as needed for cough   • albuterol sulfate  (90 Base) MCG/ACT inhaler 18 g 0     Sig: Inhale 2 puffs Every 4 (Four) Hours As Needed for Wheezing or Shortness of Air.   • methylPREDNISolone (MEDROL) 4 MG dose pack 21 each 0     Sig: Take as directed on package instructions.        Pharmacy where request should be sent: [facility] [address] - [phone]  - [phone]     Additional details provided by patient: ANITBIOTIC AND DIFLUCAN AND A NASAL SPRAY     Does the patient have less than a 3 day supply:  [x] Yes  [] No    Annabelle Miranda Rep   01/03/22 15:51 EST

## 2022-01-05 ENCOUNTER — TELEPHONE (OUTPATIENT)
Dept: INTERNAL MEDICINE | Facility: CLINIC | Age: 62
End: 2022-01-05

## 2022-01-05 NOTE — TELEPHONE ENCOUNTER
I would have her go to urgent care if she is having the shortness of breath, they can check her oxygen and get a chest xray

## 2022-01-05 NOTE — TELEPHONE ENCOUNTER
Caller: Mary Jo Brizuela    Relationship to patient: Self    Best call back number: [phone]    Date of positive COVID19 test: 01/04/2022*    COVID19 symptoms: COUGH, SHORTNESS OF BREATHE, NAUSEA, DIARRHEA, SORE THROAT     Additional information or concerns: PATIENT STATES SHE IS REQUESTING STEROIDS OR  ANYTHING TO HELP    What is the patients preferred pharmacy: [facility] [facility] - [phone] [facility] [phone]

## 2022-01-20 ENCOUNTER — OFFICE VISIT (OUTPATIENT)
Dept: INTERNAL MEDICINE | Facility: CLINIC | Age: 62
End: 2022-01-20

## 2022-01-20 VITALS
TEMPERATURE: 97.1 F | BODY MASS INDEX: 36.68 KG/M2 | SYSTOLIC BLOOD PRESSURE: 126 MMHG | HEART RATE: 98 BPM | HEIGHT: 63 IN | WEIGHT: 207 LBS | OXYGEN SATURATION: 97 % | RESPIRATION RATE: 16 BRPM | DIASTOLIC BLOOD PRESSURE: 74 MMHG

## 2022-01-20 DIAGNOSIS — N63.20 LEFT BREAST MASS: ICD-10-CM

## 2022-01-20 DIAGNOSIS — J45.30 MILD PERSISTENT ASTHMA WITHOUT COMPLICATION: Chronic | ICD-10-CM

## 2022-01-20 DIAGNOSIS — F41.9 ANXIETY: Primary | Chronic | ICD-10-CM

## 2022-01-20 DIAGNOSIS — Z12.31 ENCOUNTER FOR SCREENING MAMMOGRAM FOR MALIGNANT NEOPLASM OF BREAST: ICD-10-CM

## 2022-01-20 PROCEDURE — 99214 OFFICE O/P EST MOD 30 MIN: CPT | Performed by: INTERNAL MEDICINE

## 2022-01-20 RX ORDER — ALBUTEROL SULFATE 90 UG/1
2 AEROSOL, METERED RESPIRATORY (INHALATION) EVERY 4 HOURS PRN
Qty: 18 G | Refills: 2 | Status: SHIPPED | OUTPATIENT
Start: 2022-01-20 | End: 2022-12-08 | Stop reason: SDUPTHER

## 2022-01-20 RX ORDER — FLUTICASONE FUROATE 100 UG/1
1 POWDER RESPIRATORY (INHALATION) DAILY
Qty: 30 EACH | Refills: 5 | Status: SHIPPED | OUTPATIENT
Start: 2022-01-20 | End: 2022-04-28 | Stop reason: SDUPTHER

## 2022-01-20 NOTE — ASSESSMENT & PLAN NOTE
UNCONTROLLED  - previous meds trialed: fluoxetine (currently on, has been up to dose to 60 mg and has had side effects), sertraline (not effective), wellbutrin (not effective)  - Rx citalopram in the past but never filled as she had heard bad effects of this medication  - will attempt to change to trintellix for both improved control of her anxiety and depression and also improved side effect profile  - pt to cont on current fluoxetine until new Rx available, then can transition directly  - Reviewed potential side effects of medication including sexual performance changes, insomnia, fatigue, weight changes. Pt tolerating well without any issues.

## 2022-01-20 NOTE — ASSESSMENT & PLAN NOTE
UNCONTROLLED  - not acutely exacerbated, but notes she never returned fully to her previous baseline after her recent COVID infection  - will start pt on once daily ICS for baseline improvement  - cont on singulair once daily at night  - cont with alb for prn use, hope to see subjective improvement in overall use with addition of ICS

## 2022-01-20 NOTE — PROGRESS NOTES
"Chief Complaint  Breast Mass, Med Refill, and Anxiety    Subjective          Mary Jo Brizuela presents to [facility] for MED REFILL AND FOLLOW UP. Pt taking all medications daily as prescribed with good reported compliance. No issues or side effects with meds. Is currently on fluoxetine, did request a change of Rx 6 mos ago and Rx for Celexa was sent in but she never actually changed meds. Still taking fluoxetine 20 mg daily at this time, does feel like she would still like to be on something different, specifically feels like it makes her hungry and has gained a lot of weight on this medication. Previously was on zoloft in her 30s and also tried wellbutrin but this was not effective for her.   Also recently had COVID and seems to be hanging on to her cough. Has been using her alb more routinely since that time. Feeling somewhat short of breath in the am when she wakes up. Did not use steroids during COVID infection but had had recently before then related to URI that was impacting her breathing.   Also concerned about possible breast mass. Started after wearing a bathing suit 1 year ago that seemed to rub a spot on her left breast. Over time the skin laceration has healed, but the area still seems to have an area of induration and is bothersome to her and concerning.       Objective   Vital Signs:     /74   Pulse 98   Temp 97.1 °F (36.2 °C)   Resp 16   Ht 160 cm (63\")   Wt 93.9 kg (207 lb)   SpO2 97%   BMI 36.67 kg/m²     Physical Exam  Vitals and nursing note reviewed.   Constitutional:       General: She is not in acute distress.     Appearance: Normal appearance.   Cardiovascular:      Rate and Rhythm: Normal rate and regular rhythm.      Pulses: Normal pulses.      Heart sounds: Normal heart sounds. No murmur heard.      Pulmonary:      Effort: Pulmonary effort is normal. No respiratory distress.      Breath sounds: Normal breath sounds.   Chest: "   Breasts:      Right: Normal.      Left: Skin change (pea sized erythematous papule at 7 oclock location, no surround eyrthema, no underlying induration, no tenderness, no palpable mass) present. No swelling, mass or tenderness.       Abdominal:      General: Abdomen is flat. Bowel sounds are normal.      Palpations: Abdomen is soft.      Tenderness: There is no abdominal tenderness.   Musculoskeletal:      Right lower leg: No edema.      Left lower leg: No edema.   Neurological:      Mental Status: She is alert and oriented to person, place, and time. Mental status is at baseline.   Psychiatric:         Mood and Affect: Mood normal.         Behavior: Behavior normal.          Result Review : : None     Assessment and Plan      Diagnoses and all orders for this visit:    1. Anxiety (Primary)  Assessment & Plan:  UNCONTROLLED  - previous meds trialed: fluoxetine (currently on, has been up to dose to 60 mg and has had side effects), sertraline (not effective), wellbutrin (not effective)  - Rx citalopram in the past but never filled as she had heard bad effects of this medication  - will attempt to change to trintellix for both improved control of her anxiety and depression and also improved side effect profile  - pt to cont on current fluoxetine until new Rx available, then can transition directly  - Reviewed potential side effects of medication including sexual performance changes, insomnia, fatigue, weight changes. Pt tolerating well without any issues.       Orders:  -     Vortioxetine HBr 10 MG tablet; Take 10 mg by mouth Daily.  Dispense: 30 tablet; Refill: 0    2. Mild persistent asthma without complication  Assessment & Plan:  UNCONTROLLED  - not acutely exacerbated, but notes she never returned fully to her previous baseline after her recent COVID infection  - will start pt on once daily ICS for baseline improvement  - cont on singulair once daily at night  - cont with alb for prn use, hope to see subjective  improvement in overall use with addition of ICS      Orders:  -     albuterol sulfate  (90 Base) MCG/ACT inhaler; Inhale 2 puffs Every 4 (Four) Hours As Needed for Wheezing or Shortness of Air.  Dispense: 18 g; Refill: 2  -     Fluticasone Furoate (Arnuity Ellipta) 100 MCG/ACT aerosol powder ; Inhale 1 puff Daily.  Dispense: 30 each; Refill: 5    3. Left breast mass   - area of concerns is very superficial and without any underlying breast tissue changes, suspect just scar tissue from previous skin laceration   - is due for annual mammo, will order this today for routine screening     Orders:  -     Mammo Screening Bilateral With CAD; Future    4. Encounter for screening mammogram for malignant neoplasm of breast  -     Mammo Screening Bilateral With CAD; Future      Follow Up   Return in about 5 weeks (around 2/24/2022) for follow up.    Patient was given instructions and counseling regarding her condition or for health maintenance advice. Please see specific information pulled into the AVS if appropriate.     Erendira Green MD  [facility]  Phone: [phone]  Fax: [phone]

## 2022-01-27 ENCOUNTER — TELEPHONE (OUTPATIENT)
Dept: INTERNAL MEDICINE | Facility: CLINIC | Age: 62
End: 2022-01-27

## 2022-01-27 NOTE — TELEPHONE ENCOUNTER
Called to let pt know that trintellix was denied; asked if okay to send in lexapro and for pt to callback and let us know.

## 2022-02-23 ENCOUNTER — TELEPHONE (OUTPATIENT)
Dept: INTERNAL MEDICINE | Facility: CLINIC | Age: 62
End: 2022-02-23

## 2022-02-23 RX ORDER — ESCITALOPRAM OXALATE 20 MG/1
TABLET ORAL
Qty: 30 TABLET | Refills: 1 | Status: SHIPPED | OUTPATIENT
Start: 2022-02-23 | End: 2022-04-28 | Stop reason: SDUPTHER

## 2022-02-23 NOTE — TELEPHONE ENCOUNTER
----- Message from Annabelle Larios sent at 2/23/2022  1:38 PM EST -----  Regarding: MEDICATION  Patient would like to try Lexapro instead of Trintellix. Thank you!

## 2022-02-23 NOTE — TELEPHONE ENCOUNTER
Will send Rx, should start with 1/2 tab x 3 weeks, then can increase to full 20 mg dose if she feels like the medication could still work better. Should make an appt with me in 6 week

## 2022-02-23 NOTE — TELEPHONE ENCOUNTER
Caller: Mary Jo Brizuela     Relationship to Patient: SELF    Phone Number: [phone]     Reason for Call: PATIENT CALLED TO CANCEL HER APPOINTMENT TOMORROW BECAUSE IT'S A FOLLOW UP FOR HER LEXAPRO, BUT SHE STILL HASN'T RECEIVED THAT MEDICATION YET. SHE SAID SHE INFORMED DR. GUSMAN SHE IS WILLING TO TRY THAT INSTEAD OF TRINTELLIX, BUT HASN'T HEARD ANYTHING.   PLEASE CALL PATIENT TO RESCHEDULE F/U

## 2022-03-16 DIAGNOSIS — N63.20 LEFT BREAST MASS: Primary | ICD-10-CM

## 2022-04-19 ENCOUNTER — HOSPITAL ENCOUNTER (OUTPATIENT)
Dept: MAMMOGRAPHY | Facility: HOSPITAL | Age: 62
Discharge: HOME | End: 2022-04-19

## 2022-04-19 ENCOUNTER — HOSPITAL ENCOUNTER (OUTPATIENT)
Dept: ULTRASOUND IMAGING | Facility: HOSPITAL | Age: 62
Discharge: HOME | End: 2022-04-19

## 2022-04-19 DIAGNOSIS — N63.20 LEFT BREAST MASS: ICD-10-CM

## 2022-04-19 PROCEDURE — 76642 ULTRASOUND BREAST LIMITED: CPT

## 2022-04-19 PROCEDURE — 77066 DX MAMMO INCL CAD BI: CPT

## 2022-04-19 PROCEDURE — G0279 TOMOSYNTHESIS, MAMMO: HCPCS

## 2022-04-28 ENCOUNTER — OFFICE VISIT (OUTPATIENT)
Dept: INTERNAL MEDICINE | Facility: CLINIC | Age: 62
End: 2022-04-28

## 2022-04-28 VITALS
OXYGEN SATURATION: 97 % | HEART RATE: 97 BPM | TEMPERATURE: 96.8 F | SYSTOLIC BLOOD PRESSURE: 118 MMHG | WEIGHT: 201 LBS | DIASTOLIC BLOOD PRESSURE: 82 MMHG | RESPIRATION RATE: 16 BRPM | HEIGHT: 63 IN | BODY MASS INDEX: 35.61 KG/M2

## 2022-04-28 DIAGNOSIS — J45.30 MILD PERSISTENT ASTHMA WITHOUT COMPLICATION: Chronic | ICD-10-CM

## 2022-04-28 DIAGNOSIS — F41.9 ANXIETY: Primary | Chronic | ICD-10-CM

## 2022-04-28 PROCEDURE — 99214 OFFICE O/P EST MOD 30 MIN: CPT | Performed by: INTERNAL MEDICINE

## 2022-04-28 RX ORDER — FLUTICASONE FUROATE 100 UG/1
1 POWDER RESPIRATORY (INHALATION) DAILY
Qty: 30 EACH | Refills: 5 | Status: SHIPPED | OUTPATIENT
Start: 2022-04-28 | End: 2022-12-08 | Stop reason: SDUPTHER

## 2022-04-28 RX ORDER — ESCITALOPRAM OXALATE 20 MG/1
20 TABLET ORAL DAILY
Qty: 90 TABLET | Refills: 1 | Status: SHIPPED | OUTPATIENT
Start: 2022-04-28 | End: 2022-10-27

## 2022-04-28 RX ORDER — MONTELUKAST SODIUM 10 MG/1
10 TABLET ORAL NIGHTLY
Qty: 90 TABLET | Refills: 0 | Status: SHIPPED | OUTPATIENT
Start: 2022-04-28 | End: 2022-08-19

## 2022-04-28 NOTE — ASSESSMENT & PLAN NOTE
CONTROLLED  - not acutely exacerbated  - Cont once daily ICS  - cont on singulair once daily at night  - cont with alb for prn use

## 2022-04-28 NOTE — PROGRESS NOTES
"Chief Complaint  Follow-up and Anxiety    Subjective          Mary Jo Brizuela presents to [facility] for follow-up of anxiety & asthma.     Switched to Lexapro 2/23. Started on 1/2 tablet (10 mg). Patient feels her anxiety was doing well on this with anxiety well controlled until 1 week ago. Over last week she has been very stressed due to her dogs health.     With regards to her asthma. She was using her rescue albuterol once daily until last 2 weeks but thinks it was because she was sick and was visiting [place]. Has only used 3 times over last 2 weeks since returning to [place].     Objective   Vital Signs:     /82   Pulse 97   Temp 96.8 °F (36 °C)   Resp 16   Ht 160 cm (63\")   Wt 91.2 kg (201 lb)   SpO2 97%   BMI 35.61 kg/m²     Physical Exam  Constitutional:       General: She is not in acute distress.     Appearance: Normal appearance. She is not toxic-appearing.   HENT:      Head: Normocephalic and atraumatic.      Right Ear: External ear normal.      Left Ear: External ear normal.      Nose: Nose normal.      Mouth/Throat:      Mouth: Mucous membranes are moist.      Pharynx: Oropharynx is clear.   Eyes:      Extraocular Movements: Extraocular movements intact.      Conjunctiva/sclera: Conjunctivae normal.      Pupils: Pupils are equal, round, and reactive to light.   Cardiovascular:      Rate and Rhythm: Normal rate and regular rhythm.      Pulses: Normal pulses.      Heart sounds: Normal heart sounds. No murmur heard.  Pulmonary:      Effort: Pulmonary effort is normal.      Breath sounds: Normal breath sounds. No wheezing or rhonchi.   Abdominal:      General: Bowel sounds are normal.      Palpations: Abdomen is soft.   Musculoskeletal:         General: Normal range of motion.      Cervical back: Normal range of motion and neck supple.   Skin:     General: Skin is warm and dry.      Capillary Refill: Capillary refill takes less than 2 seconds. "   Neurological:      General: No focal deficit present.      Mental Status: She is alert and oriented to person, place, and time.   Psychiatric:         Mood and Affect: Mood normal.         Behavior: Behavior normal.          Result Review :   The following data was reviewed by: Rebecca Green MD on 04/28/2022: None         Assessment and Plan      Diagnoses and all orders for this visit:    1. Anxiety (Primary)  Assessment & Plan:  UNCONTROLLED  - previous meds trialed: fluoxetine (currently on, has been up to dose to 60 mg and has had side effects), sertraline (not effective), wellbutrin (not effective)  - Rx citalopram in the past but never filled as she had heard bad effects of this medication  - Switched to Escitalopram 10 mg daily in February, has helped with symptoms -> discussed going up to 20 mg with patient if she feels it is needed  - Reviewed potential side effects of medication including sexual performance changes, insomnia, fatigue, weight changes. Pt tolerating well without any issues.     Orders:  -     escitalopram (Lexapro) 20 MG tablet; Take 1 tablet by mouth Daily. Take 1/2 tab x 3 weeks, then can increase to 1 full tab daily if indicated  Dispense: 90 tablet; Refill: 1    2. Mild persistent asthma without complication  Assessment & Plan:  CONTROLLED  - not acutely exacerbated  - Cont once daily ICS  - cont on singulair once daily at night  - cont with alb for prn use      Orders:  -     montelukast (Singulair) 10 MG tablet; Take 1 tablet by mouth Every Night.  Dispense: 90 tablet; Refill: 0  -     Fluticasone Furoate (Arnuity Ellipta) 100 MCG/ACT aerosol powder ; Inhale 1 puff Daily.  Dispense: 30 each; Refill: 5      Follow Up   Return in about 6 months (around 10/28/2022) for Annual physical.    Patient was given instructions and counseling regarding her condition or for health maintenance advice. Please see specific information pulled into the AVS if appropriate.     Lashawn Page  PGY2    Patient seen and evaluated with Dr. Page. Pertinent portions of history and exam repeated. Agree with assessment and plan as above. 62 yo F with asthma and anxiety here for follow up. Transitioned to lexapro earlier this year with great improvement, currently on 10 mg but contemplating going up to 20 mg. Asthma also greatly improved with addition of ICS, cont on this plan for now. RTC for physical.     Erendira Green MD  [facility]  Phone: [phone]  Fax: [phone]

## 2022-04-28 NOTE — ASSESSMENT & PLAN NOTE
UNCONTROLLED  - previous meds trialed: fluoxetine (currently on, has been up to dose to 60 mg and has had side effects), sertraline (not effective), wellbutrin (not effective)  - Rx citalopram in the past but never filled as she had heard bad effects of this medication  - Switched to Escitalopram 10 mg daily in February, has helped with symptoms -> discussed going up to 20 mg with patient if she feels it is needed  - Reviewed potential side effects of medication including sexual performance changes, insomnia, fatigue, weight changes. Pt tolerating well without any issues.

## 2022-06-02 ENCOUNTER — TELEPHONE (OUTPATIENT)
Dept: INTERNAL MEDICINE | Facility: CLINIC | Age: 62
End: 2022-06-02

## 2022-06-02 RX ORDER — FLUCONAZOLE 150 MG/1
150 TABLET ORAL ONCE
Qty: 1 TABLET | Refills: 0 | Status: SHIPPED | OUTPATIENT
Start: 2022-06-02 | End: 2022-06-02

## 2022-06-02 NOTE — TELEPHONE ENCOUNTER
Typically this would need an appt since it is not associated with an abx we recently sent in, but given how tight we are on appts will agree to send in med, however, if one dose does not fix it she will need to be seen

## 2022-06-02 NOTE — TELEPHONE ENCOUNTER
Caller: Mary Jo Brizuela    Relationship: Self    Best call back number: [phone] (H)    What medication are you requesting: DIFLUCAN    What are your current symptoms: YEAST INFECTION - BURNING, SMELL & IRRITATION.    How long have you been experiencing symptoms:      Have you had these symptoms before:    [x] Yes  [] No    Have you been treated for these symptoms before:   [x] Yes  [] No    If a prescription is needed, what is your preferred pharmacy and phone number:  [facility] [address] [facility] - [phone]  - [phone]         Additional notes:        THANKS

## 2022-06-10 ENCOUNTER — TELEPHONE (OUTPATIENT)
Dept: INTERNAL MEDICINE | Facility: CLINIC | Age: 62
End: 2022-06-10

## 2022-06-10 ENCOUNTER — OFFICE VISIT (OUTPATIENT)
Dept: INTERNAL MEDICINE | Facility: CLINIC | Age: 62
End: 2022-06-10

## 2022-06-10 VITALS
WEIGHT: 202 LBS | HEIGHT: 63 IN | TEMPERATURE: 95.5 F | SYSTOLIC BLOOD PRESSURE: 110 MMHG | DIASTOLIC BLOOD PRESSURE: 72 MMHG | HEART RATE: 101 BPM | BODY MASS INDEX: 35.79 KG/M2 | OXYGEN SATURATION: 97 % | RESPIRATION RATE: 16 BRPM

## 2022-06-10 DIAGNOSIS — Z00.00 ROUTINE GENERAL MEDICAL EXAMINATION AT A HEALTH CARE FACILITY: Primary | ICD-10-CM

## 2022-06-10 DIAGNOSIS — R31.9 HEMATURIA, UNSPECIFIED TYPE: ICD-10-CM

## 2022-06-10 DIAGNOSIS — Z78.0 MENOPAUSE: ICD-10-CM

## 2022-06-10 DIAGNOSIS — Z11.59 ENCOUNTER FOR HEPATITIS C SCREENING TEST FOR LOW RISK PATIENT: ICD-10-CM

## 2022-06-10 LAB
BILIRUB BLD-MCNC: NEGATIVE MG/DL
CLARITY, POC: CLEAR
COLOR UR: YELLOW
EXPIRATION DATE: ABNORMAL
GLUCOSE UR STRIP-MCNC: NEGATIVE MG/DL
KETONES UR QL: NEGATIVE
LEUKOCYTE EST, POC: NEGATIVE
Lab: ABNORMAL
NITRITE UR-MCNC: NEGATIVE MG/ML
PH UR: 5 [PH] (ref 5–8)
PROT UR STRIP-MCNC: NEGATIVE MG/DL
RBC # UR STRIP: ABNORMAL /UL
SP GR UR: 1.02 (ref 1–1.03)
UROBILINOGEN UR QL: NORMAL

## 2022-06-10 PROCEDURE — 90472 IMMUNIZATION ADMIN EACH ADD: CPT | Performed by: INTERNAL MEDICINE

## 2022-06-10 PROCEDURE — 90471 IMMUNIZATION ADMIN: CPT | Performed by: INTERNAL MEDICINE

## 2022-06-10 PROCEDURE — 81003 URINALYSIS AUTO W/O SCOPE: CPT | Performed by: INTERNAL MEDICINE

## 2022-06-10 PROCEDURE — 90750 HZV VACC RECOMBINANT IM: CPT | Performed by: INTERNAL MEDICINE

## 2022-06-10 PROCEDURE — 99396 PREV VISIT EST AGE 40-64: CPT | Performed by: INTERNAL MEDICINE

## 2022-06-10 PROCEDURE — 90677 PCV20 VACCINE IM: CPT | Performed by: INTERNAL MEDICINE

## 2022-06-10 RX ORDER — FLUCONAZOLE 150 MG/1
150 TABLET ORAL
Qty: 2 TABLET | Refills: 0 | Status: SHIPPED | OUTPATIENT
Start: 2022-06-10 | End: 2022-06-14

## 2022-06-10 NOTE — PROGRESS NOTES
"Chief Complaint   Patient presents with   • Annual Exam   • Gynecologic Exam       Subjective   History of Present Illness    Mary Jo Brizuela is a 61 y.o. female here for annual wellness. Pt does not have acute issues to discuss today. States overall things are going well. Taking all meds as prescribed without any issues.     The following portions of the patient's history were reviewed and updated as appropriate: allergies, current medications, past family history, past medical history, past social history, past surgical history, and problem list.    Patient Care Team:  Rebecca Green MD as PCP - General (Internal Medicine & Pediatrics)    Review of Systems   Constitutional: Negative for activity change, chills and fever.   HENT: Negative for congestion, ear pain, rhinorrhea and sore throat.    Eyes: Negative for double vision, pain and visual disturbance.   Respiratory: Negative for cough, shortness of breath and wheezing.    Cardiovascular: Negative for chest pain, palpitations and leg swelling.   Gastrointestinal: Negative for abdominal pain, blood in stool, constipation, diarrhea, nausea and vomiting.   Endocrine: Negative for cold intolerance and heat intolerance.   Genitourinary: Negative for difficulty urinating, dysuria and frequency.   Musculoskeletal: Negative for arthralgias and myalgias.   Skin: Negative for rash and skin lesions.   Neurological: Negative for dizziness, tremors and headache.   Hematological: Negative for adenopathy. Does not bruise/bleed easily.   Psychiatric/Behavioral: Negative for behavioral problems and suicidal ideas.       Body mass index is 35.78 kg/m².   Vitals:    06/10/22 1043   BP: 110/72   Pulse: 101   Resp: 16   Temp: 95.5 °F (35.3 °C)   SpO2: 97%   Weight: 91.6 kg (202 lb)   Height: 160 cm (63\")        Objective   Physical Exam  Vitals and nursing note reviewed.   Constitutional:       General: She is not in acute distress.     Appearance: Normal appearance.   HENT:    "   Head: Normocephalic and atraumatic.      Right Ear: Tympanic membrane and ear canal normal.      Left Ear: Tympanic membrane and ear canal normal.      Nose: Nose normal.      Mouth/Throat:      Mouth: Mucous membranes are moist.      Pharynx: Oropharynx is clear.   Eyes:      Extraocular Movements: Extraocular movements intact.      Conjunctiva/sclera: Conjunctivae normal.      Pupils: Pupils are equal, round, and reactive to light.   Cardiovascular:      Rate and Rhythm: Normal rate and regular rhythm.      Heart sounds: Normal heart sounds. No murmur heard.  Pulmonary:      Effort: Pulmonary effort is normal. No respiratory distress.      Breath sounds: Normal breath sounds. No wheezing or rhonchi.   Abdominal:      General: Bowel sounds are normal. There is no distension.      Palpations: Abdomen is soft. There is no mass.      Tenderness: There is no abdominal tenderness.      Comments: no hepatosplenomegaly   Musculoskeletal:         General: No deformity. Normal range of motion.      Cervical back: Normal range of motion and neck supple.   Skin:     General: Skin is warm and dry.      Capillary Refill: Capillary refill takes less than 2 seconds.      Findings: No lesion or rash.   Neurological:      General: No focal deficit present.      Mental Status: She is alert and oriented to person, place, and time.      Cranial Nerves: No cranial nerve deficit.   Psychiatric:         Mood and Affect: Mood normal.         Behavior: Behavior normal.         Judgment: Judgment normal.         Brief Urine Lab Results  (Last result in the past 365 days)      Color   Clarity   Blood   Leuk Est   Nitrite   Protein   CREAT   Urine HCG        06/10/22 1214 Yellow   Clear   Trace   Negative   Negative   Negative                   Current Outpatient Medications:   •  albuterol sulfate  (90 Base) MCG/ACT inhaler, Inhale 2 puffs Every 4 (Four) Hours As Needed for Wheezing or Shortness of Air., Disp: 18 g, Rfl: 2  •   aspirin 81 MG tablet, Take 1 tablet by mouth Daily., Disp: 30 tablet, Rfl: 0  •  azelastine (ASTELIN) 0.1 % nasal spray, 1 spray into the nostril(s) as directed by provider Daily. Use in each nostril as directed, Disp: 30 mL, Rfl: 0  •  Biotin 1 MG capsule, Take  by mouth Every Other Day., Disp: , Rfl:   •  escitalopram (Lexapro) 20 MG tablet, Take 1 tablet by mouth Daily. Take 1/2 tab x 3 weeks, then can increase to 1 full tab daily if indicated, Disp: 90 tablet, Rfl: 1  •  fluticasone (FLONASE) 50 MCG/ACT nasal spray, 2 sprays into each nostril Daily., Disp: , Rfl:   •  Fluticasone Furoate (Arnuity Ellipta) 100 MCG/ACT aerosol powder , Inhale 1 puff Daily., Disp: 30 each, Rfl: 5  •  Loratadine 10 MG capsule, Take  by mouth., Disp: , Rfl:   •  montelukast (Singulair) 10 MG tablet, Take 1 tablet by mouth Every Night., Disp: 90 tablet, Rfl: 0  •  VITAMIN D, ERGOCALCIFEROL, PO, Take 2,000 Units by mouth Every Night., Disp: , Rfl:   •  benzonatate (Tessalon Perles) 100 MG capsule, Take 1-2 capsules every 8 hours as needed for cough, Disp: 30 capsule, Rfl: 0  •  fluconazole (Diflucan) 150 MG tablet, Take 1 tablet by mouth Every 72 (Seventy-Two) Hours for 2 doses., Disp: 2 tablet, Rfl: 0       Health Maintenance   Topic Date Due   • LIPID PANEL  05/02/2018   • PAP SMEAR  06/13/2022   • INFLUENZA VACCINE  08/01/2022   • ZOSTER VACCINE (2 of 2) 08/05/2022   • MAMMOGRAM  04/19/2024   • TDAP/TD VACCINES (3 - Td or Tdap) 04/21/2030        Immunization History   Administered Date(s) Administered   • COVID-19 (PFIZER) PURPLE CAP 07/20/2021, 08/10/2021   • Pneumococcal Conjugate 20-Valent (PCV20) 06/10/2022   • Shingrix 06/10/2022   • Tdap 10/18/2016, 04/21/2020       Assessment & Plan     Annual Wellness  - Labs ordered today including CBC, CMP, Fasting lipid panel, HgbA1C, TSH, Vit D and Hep C. Will call with results once available and make follow up plan and med changes as appropriate.   - Cont current medications for chronic  medical issues, refills sent as needed today.   - EKG done previously, reviewed and in chart  - PAP smear up to date and normal. Last done 06/2019 and negative with neg HPV testing, repeat 5 years, due 2024.  - Mammo Last done 04/22 and abnormal with concerning density, had follow up diagnostic testing and US and cleared as benign lesion with follow up plan in 1 year  - Cscope up to date and normal. Last done 12/2020 with neg cologuard, repeat 3 years, due 12/2023  - DEXA due, ordered today  - Lung CA screening not indicated  - Immunizations: Flu shot due Fall 2022. COVID series completed, booster eligible. TDaP done 2020, UTD. Will do shingrix #1 today to initiate series. [id number] given today.   Orders Placed This Encounter   Procedures   • Shingrix Vaccine   • Pneumococcal Conjugate Vaccine 20-Valent (PCV20)      - Depression screen reviewed with patient and negative. Taking lexapro daily and working well for her.   - Advised behavioral modifications including dietary changes and increased exercise with goal of healthy weight and lifestyle.       Return in about 6 months (around 12/10/2022) for follow up.     Erendira Green MD  [facility] [place] [facility]  Phone: [phone]  Fax: [phone]

## 2022-06-11 LAB
25(OH)D3+25(OH)D2 SERPL-MCNC: 49.9 NG/ML (ref 30–100)
ALBUMIN SERPL-MCNC: 4.7 G/DL (ref 3.8–4.9)
ALBUMIN/GLOB SERPL: 2.2 {RATIO} (ref 1.2–2.2)
ALP SERPL-CCNC: 84 IU/L (ref 44–121)
ALT SERPL-CCNC: 16 IU/L (ref 0–32)
APPEARANCE UR: CLEAR
AST SERPL-CCNC: 15 IU/L (ref 0–40)
BACTERIA #/AREA URNS HPF: ABNORMAL /[HPF]
BASOPHILS # BLD AUTO: 0.1 X10E3/UL (ref 0–0.2)
BASOPHILS NFR BLD AUTO: 1 %
BILIRUB SERPL-MCNC: 0.5 MG/DL (ref 0–1.2)
BILIRUB UR QL STRIP: NEGATIVE
BUN SERPL-MCNC: 16 MG/DL (ref 6–24)
BUN/CREAT SERPL: 20 (ref 9–23)
CALCIUM SERPL-MCNC: 9.8 MG/DL (ref 8.7–10.2)
CASTS URNS QL MICRO: ABNORMAL /LPF
CHLORIDE SERPL-SCNC: 103 MMOL/L (ref 96–106)
CHOLEST SERPL-MCNC: 237 MG/DL (ref 100–199)
CO2 SERPL-SCNC: 24 MMOL/L (ref 20–29)
COLOR UR: YELLOW
CREAT SERPL-MCNC: 0.8 MG/DL (ref 0.57–1)
EGFRCR SERPLBLD CKD-EPI 2021: 85 ML/MIN/1.73
EOSINOPHIL # BLD AUTO: 0.2 X10E3/UL (ref 0–0.4)
EOSINOPHIL NFR BLD AUTO: 3 %
EPI CELLS #/AREA URNS HPF: >10 /HPF (ref 0–10)
ERYTHROCYTE [DISTWIDTH] IN BLOOD BY AUTOMATED COUNT: 12 % (ref 11.7–15.4)
ESTRADIOL SERPL-MCNC: 15 PG/ML
FSH SERPL-ACNC: 35.9 MIU/ML
GLOBULIN SER CALC-MCNC: 2.1 G/DL (ref 1.5–4.5)
GLUCOSE SERPL-MCNC: 119 MG/DL (ref 65–99)
GLUCOSE UR QL STRIP: NEGATIVE
HBA1C MFR BLD: 6 % (ref 4.8–5.6)
HCT VFR BLD AUTO: 41.9 % (ref 34–46.6)
HCV AB S/CO SERPL IA: 0.1 S/CO RATIO (ref 0–0.9)
HDLC SERPL-MCNC: 46 MG/DL
HGB BLD-MCNC: 14.2 G/DL (ref 11.1–15.9)
HGB UR QL STRIP: NEGATIVE
IMM GRANULOCYTES # BLD AUTO: 0 X10E3/UL (ref 0–0.1)
IMM GRANULOCYTES NFR BLD AUTO: 0 %
KETONES UR QL STRIP: NEGATIVE
LDLC SERPL CALC-MCNC: 177 MG/DL (ref 0–99)
LEUKOCYTE ESTERASE UR QL STRIP: NEGATIVE
LH SERPL-ACNC: 16.9 MIU/ML
LYMPHOCYTES # BLD AUTO: 2.2 X10E3/UL (ref 0.7–3.1)
LYMPHOCYTES NFR BLD AUTO: 29 %
MCH RBC QN AUTO: 30.7 PG (ref 26.6–33)
MCHC RBC AUTO-ENTMCNC: 33.9 G/DL (ref 31.5–35.7)
MCV RBC AUTO: 91 FL (ref 79–97)
MICRO URNS: NORMAL
MICRO URNS: NORMAL
MONOCYTES # BLD AUTO: 0.5 X10E3/UL (ref 0.1–0.9)
MONOCYTES NFR BLD AUTO: 6 %
NEUTROPHILS # BLD AUTO: 4.7 X10E3/UL (ref 1.4–7)
NEUTROPHILS NFR BLD AUTO: 61 %
NITRITE UR QL STRIP: NEGATIVE
PH UR STRIP: 5.5 [PH] (ref 5–7.5)
PLATELET # BLD AUTO: 214 X10E3/UL (ref 150–450)
POTASSIUM SERPL-SCNC: 4.5 MMOL/L (ref 3.5–5.2)
PROT SERPL-MCNC: 6.8 G/DL (ref 6–8.5)
PROT UR QL STRIP: NORMAL
RBC # BLD AUTO: 4.62 X10E6/UL (ref 3.77–5.28)
RBC #/AREA URNS HPF: ABNORMAL /HPF (ref 0–2)
SODIUM SERPL-SCNC: 141 MMOL/L (ref 134–144)
SP GR UR STRIP: 1.02 (ref 1–1.03)
TRIGL SERPL-MCNC: 79 MG/DL (ref 0–149)
TSH SERPL DL<=0.005 MIU/L-ACNC: 2.11 UIU/ML (ref 0.45–4.5)
UROBILINOGEN UR STRIP-MCNC: 0.2 MG/DL (ref 0.2–1)
VLDLC SERPL CALC-MCNC: 14 MG/DL (ref 5–40)
WBC # BLD AUTO: 7.8 X10E3/UL (ref 3.4–10.8)
WBC #/AREA URNS HPF: ABNORMAL /HPF (ref 0–5)

## 2022-06-28 ENCOUNTER — APPOINTMENT (OUTPATIENT)
Dept: BONE DENSITY | Facility: HOSPITAL | Age: 62
End: 2022-06-28

## 2022-06-28 DIAGNOSIS — Z00.00 ROUTINE GENERAL MEDICAL EXAMINATION AT A HEALTH CARE FACILITY: ICD-10-CM

## 2022-06-28 DIAGNOSIS — Z78.0 MENOPAUSE: ICD-10-CM

## 2022-06-28 PROCEDURE — 77080 DXA BONE DENSITY AXIAL: CPT

## 2022-08-18 DIAGNOSIS — J45.30 MILD PERSISTENT ASTHMA WITHOUT COMPLICATION: Chronic | ICD-10-CM

## 2022-08-19 RX ORDER — MONTELUKAST SODIUM 10 MG/1
TABLET ORAL
Qty: 90 TABLET | Refills: 3 | Status: SHIPPED | OUTPATIENT
Start: 2022-08-19 | End: 2022-12-08 | Stop reason: SDUPTHER

## 2022-08-19 NOTE — TELEPHONE ENCOUNTER
Rx Refill Note  Requested Prescriptions     Pending Prescriptions Disp Refills   • montelukast (SINGULAIR) 10 MG tablet [Pharmacy Med Name: MONTELUKAST SOD 10 MG TABLET] 90 tablet 0     Sig: TAKE ONE TABLET BY MOUTH ONCE NIGHTLY      Last office visit with prescribing clinician: 6/10/2022      Next office visit with prescribing clinician: 12/8/2022            Niharika Galeana MA  08/19/22, 07:59 EDT

## 2022-10-27 DIAGNOSIS — F41.9 ANXIETY: Chronic | ICD-10-CM

## 2022-10-27 RX ORDER — ESCITALOPRAM OXALATE 20 MG/1
TABLET ORAL
Qty: 90 TABLET | Refills: 0 | Status: SHIPPED | OUTPATIENT
Start: 2022-10-27 | End: 2022-12-08 | Stop reason: SDUPTHER

## 2022-10-27 NOTE — TELEPHONE ENCOUNTER
Rx Refill Note  Requested Prescriptions     Pending Prescriptions Disp Refills   • escitalopram (LEXAPRO) 20 MG tablet [Pharmacy Med Name: ESCITALOPRAM 20 MG TABLET] 90 tablet 1     Sig: TAKE ONE HALF TABLET BY MOUTH FOR 3 WEEKS THEN CAN INCREASE TO TAKE ONE TABLET BY MOUTH DAILY IF INDICATED      Last office visit with prescribing clinician: 6/10/2022      Next office visit with prescribing clinician: 12/8/2022            Niharika Galeana MA  10/27/22, 07:58 EDT

## 2022-11-20 ENCOUNTER — HOSPITAL ENCOUNTER (EMERGENCY)
Facility: HOSPITAL | Age: 62
Discharge: HOME | End: 2022-11-21
Attending: EMERGENCY MEDICINE

## 2022-11-20 VITALS
SYSTOLIC BLOOD PRESSURE: 146 MMHG | TEMPERATURE: 97.8 F | OXYGEN SATURATION: 99 % | BODY MASS INDEX: 33.13 KG/M2 | HEIGHT: 63 IN | HEART RATE: 92 BPM | WEIGHT: 187 LBS | DIASTOLIC BLOOD PRESSURE: 84 MMHG | RESPIRATION RATE: 16 BRPM

## 2022-11-20 DIAGNOSIS — K59.00 CONSTIPATION, UNSPECIFIED CONSTIPATION TYPE: ICD-10-CM

## 2022-11-20 DIAGNOSIS — K56.41 FECAL IMPACTION: Primary | ICD-10-CM

## 2022-11-20 PROCEDURE — 99284 EMERGENCY DEPT VISIT MOD MDM: CPT

## 2022-11-21 ENCOUNTER — APPOINTMENT (OUTPATIENT)
Dept: GENERAL RADIOLOGY | Facility: HOSPITAL | Age: 62
End: 2022-11-21

## 2022-11-21 PROCEDURE — 74019 RADEX ABDOMEN 2 VIEWS: CPT

## 2022-11-21 NOTE — ED PROVIDER NOTES
"Subjective     History provided by:  Patient    History of Present Illness    · Chief complaint: Constipation    · Location: In the rectum.    · Quality/Severity: The patient states she feels a hard turd lodged in her rectum.    · Timing/Onset: She noticed the turd impacted in the rectum about 7 PM.  She has not had a bowel movement in 3 days.    · Modifying Factors: The patient blames her constipation on Mounjauro medication she is taking for weight loss.  She is tried 3 enemas at home and attempted to manually disimpact herself unsuccessfully.    · Associated symptoms: She has some abdominal pain about 7 PM but has  · Currently.  She had nausea earlier without vomiting.    · Narrative: The patient is a 62-year-old white female who states she is taking Mounjauro 0.5 mL weekly on Thursday for weight loss.  She states she is read that it can cause \"constipation\".  She states she is not had a bowel movement in 3 days.  At 7 PM tonight she felt a hard stool in her rectum causing discomfort.  She had abdominal pain at that time which is resolved.  She had nausea without vomiting at that time.  She has used 3 enemas at home and attempted to manually disimpact herself and unsuccessfully.    Review of Systems   Constitutional: Positive for appetite change. Negative for chills and fever.   HENT: Negative for congestion, rhinorrhea, sinus pain and sore throat.    Eyes: Negative for pain and visual disturbance.   Respiratory: Negative for cough and shortness of breath.    Cardiovascular: Negative for chest pain.   Gastrointestinal: Positive for abdominal pain, constipation, nausea and rectal pain. Negative for abdominal distention, anal bleeding, blood in stool and vomiting.   Endocrine: Negative for polydipsia and polyuria.   Genitourinary: Negative for dysuria, frequency, genital sores, pelvic pain and urgency.   Musculoskeletal: Negative for back pain, neck pain and neck stiffness.   Skin: Negative for rash. " "  Neurological: Negative for dizziness, weakness, light-headedness and headaches.   Psychiatric/Behavioral:        The patient has been under stress due to losing her dog.     Past Medical History:   Diagnosis Date   • Acute sinusitis, unspecified    • Anemia    • Anxiety 01/20/2022   • Anxiety and depression    • Anxiety disorder    • Asymptomatic varicose veins of bilateral lower extremities    • Corns and callosities    • Frequency of micturition    • Grief reaction     NORMAL   • History of mammogram 06/2020    normal, never abn,   • Hyperlipidemia    • Left fibular fracture 04/2017   • Major depressive disorder, single episode, unspecified    • Menopausal and female climacteric states    • Mild persistent asthma without complication 01/20/2022   • Non-scarring hair loss    • Nonscarring hair loss, unspecified    • Obesity    • Other acute sinusitis    • Other amnesia    • Other chest pain    • Other fatigue    • Pain in unspecified shoulder    • Panic disorder without agoraphobia    • Papanicolaou smear 06/2019    neg with neg HPV, repeat 5 years, due 2024   • Paresthesia of skin    • Radiculopathy, cervical region    • Recurrent sinusitis    • Seasonal allergies    • Unspecified asthma, uncomplicated    • Unspecified disorder of synovium and tendon, left shoulder    • Viral infection, unspecified      /84 (BP Location: Right arm, Patient Position: Lying)   Pulse 92   Temp 97.8 °F (36.6 °C) (Oral)   Resp 16   Ht 160 cm (63\")   Wt 84.8 kg (187 lb)   SpO2 99%   BMI 33.13 kg/m²     Past Medical History:   Diagnosis Date   • Acute sinusitis, unspecified    • Anemia    • Anxiety 01/20/2022   • Anxiety and depression    • Anxiety disorder    • Asymptomatic varicose veins of bilateral lower extremities    • Corns and callosities    • Frequency of micturition    • Grief reaction     NORMAL   • History of mammogram 06/2020    normal, never abn,   • Hyperlipidemia    • Left fibular fracture 04/2017   • Major " depressive disorder, single episode, unspecified    • Menopausal and female climacteric states    • Mild persistent asthma without complication 01/20/2022   • Non-scarring hair loss    • Nonscarring hair loss, unspecified    • Obesity    • Other acute sinusitis    • Other amnesia    • Other chest pain    • Other fatigue    • Pain in unspecified shoulder    • Panic disorder without agoraphobia    • Papanicolaou smear 06/2019    neg with neg HPV, repeat 5 years, due 2024   • Paresthesia of skin    • Radiculopathy, cervical region    • Recurrent sinusitis    • Seasonal allergies    • Unspecified asthma, uncomplicated    • Unspecified disorder of synovium and tendon, left shoulder    • Viral infection, unspecified        No Known Allergies    Past Surgical History:   Procedure Laterality Date   • HYSTERECTOMY  2008    FIBROIDS       Family History   Problem Relation Age of Onset   • Heart disease Mother    • Colon polyps Brother    • Diabetes Maternal Grandmother    • Colon cancer Sister    • Breast cancer Neg Hx        Social History     Socioeconomic History   • Marital status:    • Number of children: 2   Tobacco Use   • Smoking status: Never   • Smokeless tobacco: Never   Vaping Use   • Vaping Use: Never used   Substance and Sexual Activity   • Alcohol use: No   • Drug use: Never           Objective   Physical Exam  Vitals and nursing note reviewed.   Constitutional:       General: She is not in acute distress.     Appearance: She is well-developed. She is obese. She is not ill-appearing, toxic-appearing or diaphoretic.      Comments: The patient appears anxious but otherwise healthy in no acute distress.  Review of her vital signs: Her blood pressure is elevated at 146/84, slightly tachycardic 92 which is baseline for the patient.  Remainder vitals within normal limits.   HENT:      Head: Normocephalic and atraumatic.      Nose: Nose normal.      Mouth/Throat:      Mouth: Mucous membranes are moist.       Pharynx: Oropharynx is clear.   Eyes:      General: No scleral icterus.        Right eye: No discharge.         Left eye: No discharge.      Pupils: Pupils are equal, round, and reactive to light.   Neck:      Thyroid: No thyromegaly.      Vascular: No JVD.   Cardiovascular:      Rate and Rhythm: Normal rate and regular rhythm.      Heart sounds: Normal heart sounds. No murmur heard.  Pulmonary:      Effort: Pulmonary effort is normal.      Breath sounds: Normal breath sounds. No wheezing, rhonchi or rales.   Chest:      Chest wall: No tenderness.   Abdominal:      General: Bowel sounds are normal. There is no distension.      Palpations: Abdomen is soft.      Tenderness: There is no abdominal tenderness. There is no right CVA tenderness, left CVA tenderness, guarding or rebound.   Genitourinary:     Comments: The patient has moderate hard stool in her rectum.  Musculoskeletal:         General: No tenderness or deformity. Normal range of motion.      Cervical back: Normal range of motion and neck supple.   Lymphadenopathy:      Cervical: No cervical adenopathy.   Skin:     General: Skin is warm and dry.      Findings: No rash.   Neurological:      Mental Status: She is alert and oriented to person, place, and time.      Cranial Nerves: No cranial nerve deficit.      Coordination: Coordination normal.      Comments: No focal motor sensory deficit   Psychiatric:         Thought Content: Thought content normal.         Judgment: Judgment normal.      Comments: Very anxious affect.         Procedures           ED Course  ED Course as of 11/21/22 0114   Sun Nov 20, 2022   2348 I attempted to manually disimpact the patient and did remove a small amount of hard stool with my first attempt, however the patient afterwards refused to allow me to completely disimpact her.  Soapsuds enema will attempted for her fecal impaction and constipation. [TP]   Mon Nov 21, 2022   0030 Flat and upright x-rays of the abdomen shows a  moderate stool burden with a nonobstructive bowel gas pattern to mine and the radiologist interpretation. [TP]   0046 00:45 the patient had a large bowel movement after her soapsuds enema states she feels fine now. [TP]   0046 The patient had moderate stool burden on her x-rays, I will prescribe her magnesium citrate and GoLytely to further cleanout her colon. [TP]      ED Course User Index  [TP] Rony Larry MD                                           MDM  Number of Diagnoses or Management Options  Constipation, unspecified constipation type: new and requires workup  Fecal impaction (HCC): new and requires workup     Amount and/or Complexity of Data Reviewed  Tests in the radiology section of CPT®: ordered and reviewed    Risk of Complications, Morbidity, and/or Mortality  Presenting problems: moderate  Diagnostic procedures: moderate  Management options: moderate  General comments: My differential diagnosis for abdominal pain includes but is not limited to:  Gastritis, gastroenteritis, peptic ulcer disease, GERD, esophageal perforation, acute appendicitis, mesenteric adenitis, Meckel’s diverticulum, epiploic appendagitis, diverticulitis, colon cancer, ulcerative colitis, Crohn’s disease, intussusception, small bowel obstruction, adhesions, ischemic bowel, perforated viscus, ileus, obstipation, biliary colic, cholecystitis, cholelithiasis, Bo-Brett Jj, hepatitis, pancreatitis, common bile duct obstruction, cholangitis, bile leak, splenic trauma, splenic rupture, splenic infarction, splenic abscess, abdominal abscess, ascites, spontaneous bacterial peritonitis, hernia, UTI, cystitis, prostatitis, ureterolithiasis, urinary obstruction, AAA, myocardial infarction, pneumonia, cancer, porphyria, DKA, medications, sickle cell, viral syndrome, zoster    Patient Progress  Patient progress: improved      Final diagnoses:   Fecal impaction (HCC)   Constipation, unspecified constipation type       ED Disposition  ED  Disposition     ED Disposition   Discharge    Condition   Stable    Comment   --             Rebecca Green MD  [address]  [facility] [address]  [phone]    Schedule an appointment as soon as possible for a visit   As needed         Medication List      New Prescriptions    magnesium citrate solution  Take 296 mL by mouth 1 (One) Time for 1 dose.     polyethylene glycol 236 g solution  Commonly known as: GoLYTELY  Start 45 minutes after drinking a bottle of mag citrate.  Drink 6 to 8 ounces every 15 to 20 minutes until several good bowel movements have occurred.           Where to Get Your Medications      These medications were sent to [facility] [id number] - [place], [place] - [address] - [phone] PH - [phone] FX  [address] [place] [address]    Phone: [phone]   · magnesium citrate solution  · polyethylene glycol 236 g solution         Labs Reviewed - No data to display  XR Abdomen Flat & Upright   Final Result   Nonobstructive bowel gas pattern. Moderate stool burden. No acute findings.      Signer Name: Mukesh Mcallister MD    Signed: 11/21/2022 12:26 AM    Workstation Name: [email or web address]     [facility]             Medication List      New Prescriptions    magnesium citrate solution  Take 296 mL by mouth 1 (One) Time for 1 dose.     polyethylene glycol 236 g solution  Commonly known as: GoLYTELY  Start 45 minutes after drinking a bottle of mag citrate.  Drink 6 to 8 ounces every 15 to 20 minutes until several good bowel movements have occurred.           Where to Get Your Medications      These medications were sent to [facility] [id number] - [place], [place] - [address] - [phone] [facility] [phone] FX  [address] [place] [address]    Phone: [phone]   · magnesium citrate solution  · polyethylene glycol 236 g solution              Rony Larry MD  11/21/22 0114

## 2022-12-08 ENCOUNTER — OFFICE VISIT (OUTPATIENT)
Dept: INTERNAL MEDICINE | Facility: CLINIC | Age: 62
End: 2022-12-08

## 2022-12-08 VITALS
RESPIRATION RATE: 16 BRPM | BODY MASS INDEX: 32.6 KG/M2 | WEIGHT: 184 LBS | HEIGHT: 63 IN | TEMPERATURE: 96.3 F | SYSTOLIC BLOOD PRESSURE: 130 MMHG | HEART RATE: 101 BPM | DIASTOLIC BLOOD PRESSURE: 74 MMHG | OXYGEN SATURATION: 96 %

## 2022-12-08 DIAGNOSIS — J30.9 ALLERGIC RHINITIS, UNSPECIFIED SEASONALITY, UNSPECIFIED TRIGGER: ICD-10-CM

## 2022-12-08 DIAGNOSIS — E78.49 OTHER HYPERLIPIDEMIA: ICD-10-CM

## 2022-12-08 DIAGNOSIS — Z23 ENCOUNTER FOR IMMUNIZATION: ICD-10-CM

## 2022-12-08 DIAGNOSIS — F41.9 ANXIETY: Primary | Chronic | ICD-10-CM

## 2022-12-08 DIAGNOSIS — J45.30 MILD PERSISTENT ASTHMA WITHOUT COMPLICATION: Chronic | ICD-10-CM

## 2022-12-08 DIAGNOSIS — R73.03 PRE-DIABETES: ICD-10-CM

## 2022-12-08 PROBLEM — K59.09 OTHER CONSTIPATION: Status: ACTIVE | Noted: 2022-12-08

## 2022-12-08 PROCEDURE — 90750 HZV VACC RECOMBINANT IM: CPT | Performed by: INTERNAL MEDICINE

## 2022-12-08 PROCEDURE — 99214 OFFICE O/P EST MOD 30 MIN: CPT | Performed by: INTERNAL MEDICINE

## 2022-12-08 PROCEDURE — 90686 IIV4 VACC NO PRSV 0.5 ML IM: CPT | Performed by: INTERNAL MEDICINE

## 2022-12-08 PROCEDURE — 90471 IMMUNIZATION ADMIN: CPT | Performed by: INTERNAL MEDICINE

## 2022-12-08 PROCEDURE — 90472 IMMUNIZATION ADMIN EACH ADD: CPT | Performed by: INTERNAL MEDICINE

## 2022-12-08 RX ORDER — FLUTICASONE FUROATE 100 UG/1
1 POWDER RESPIRATORY (INHALATION) DAILY
Qty: 30 EACH | Refills: 5 | Status: SHIPPED | OUTPATIENT
Start: 2022-12-08

## 2022-12-08 RX ORDER — FLUTICASONE PROPIONATE 50 MCG
2 SPRAY, SUSPENSION (ML) NASAL DAILY
Qty: 18.2 ML | Refills: 0 | Status: SHIPPED | OUTPATIENT
Start: 2022-12-08 | End: 2023-01-08

## 2022-12-08 RX ORDER — ALBUTEROL SULFATE 90 UG/1
2 AEROSOL, METERED RESPIRATORY (INHALATION) EVERY 4 HOURS PRN
Qty: 18 G | Refills: 2 | Status: SHIPPED | OUTPATIENT
Start: 2022-12-08

## 2022-12-08 RX ORDER — AZELASTINE 1 MG/ML
1 SPRAY, METERED NASAL DAILY
Qty: 30 ML | Refills: 0 | Status: SHIPPED | OUTPATIENT
Start: 2022-12-08 | End: 2023-01-05 | Stop reason: SDUPTHER

## 2022-12-08 RX ORDER — ESCITALOPRAM OXALATE 20 MG/1
20 TABLET ORAL DAILY
Qty: 90 TABLET | Refills: 0 | Status: SHIPPED | OUTPATIENT
Start: 2022-12-08

## 2022-12-08 RX ORDER — MONTELUKAST SODIUM 10 MG/1
10 TABLET ORAL NIGHTLY
Qty: 90 TABLET | Refills: 3 | Status: SHIPPED | OUTPATIENT
Start: 2022-12-08

## 2022-12-08 NOTE — PROGRESS NOTES
"Chief Complaint  Follow-up and Anxiety    Subjective          Mary Jo Brizuela presents to [facility] for follow-up of anxiety, asthma, and abnormal labs from prior visit.    Currently on Lexapro 20 mg daily. Was previously on 10 mg daily, but increased to 20 mg daily in October. She states her anxiety has been doing well on current dose. She denies any side effects.    Patient states her asthma is well controled on Azelastine, Flonase, Montelukast, Ellipta and PRN albuterol. Has not taken Ellipta in 2 weeks but is planning on starting soon. She has not needed her PRN albuterol in 2 weeks.     Patient noted to have elevated cholesterol and hemoglobin A1C 6.0 in June of this year. She has been on Mounjaro and a diet program over last 12 weeks, has lost 22 lbs. She has been having constipation since starting Mounjaro. Seen in ED 11/20 for impaction, did an enema while in the ED. Did a home Miralax cleanout followed by Miralax daily. Since then has been having at least 1 BM per day without straining.    Objective   Vital Signs:     /74   Pulse 101   Temp 96.3 °F (35.7 °C)   Resp 16   Ht 160 cm (63\")   Wt 83.5 kg (184 lb)   SpO2 96%   BMI 32.59 kg/m²       Physical Exam  Vitals and nursing note reviewed.   Constitutional:       General: She is not in acute distress.     Appearance: Normal appearance.   Cardiovascular:      Rate and Rhythm: Normal rate and regular rhythm.      Pulses: Normal pulses.      Heart sounds: Normal heart sounds. No murmur heard.  Pulmonary:      Effort: Pulmonary effort is normal. No respiratory distress.      Breath sounds: Normal breath sounds.   Abdominal:      General: Abdomen is flat. Bowel sounds are normal.      Palpations: Abdomen is soft.      Tenderness: There is no abdominal tenderness.   Musculoskeletal:      Right lower leg: No edema.      Left lower leg: No edema.   Neurological:      Mental Status: She is alert and " oriented to person, place, and time. Mental status is at baseline.   Psychiatric:         Mood and Affect: Mood normal.         Behavior: Behavior normal.          Result Review :   The following data was reviewed by: Rebecca Green MD on 12/08/2022:  CMP    CMP 6/10/22   Glucose 119 (A)   BUN 16   Creatinine 0.80   Sodium 141   Potassium 4.5   Chloride 103   Calcium 9.8   Total Protein 6.8   Albumin 4.7   Globulin 2.1   Total Bilirubin 0.5   Alkaline Phosphatase 84   AST (SGOT) 15   ALT (SGPT) 16   (A) Abnormal value            CBC w/diff    CBC w/Diff 6/10/22   WBC 7.8   RBC 4.62   Hemoglobin 14.2   Hematocrit 41.9   MCV 91   MCH 30.7   MCHC 33.9   RDW 12.0   Platelets 214   Neutrophil Rel % 61   Lymphocyte Rel % 29   Monocyte Rel % 6   Eosinophil Rel % 3   Basophil Rel % 1           Lipid Panel    Lipid Panel 6/10/22   Total Cholesterol 237 (A)   Triglycerides 79   HDL Cholesterol 46   VLDL Cholesterol 14   LDL Cholesterol  177 (A)   (A) Abnormal value            TSH    TSH 6/10/22   TSH 2.110           Most Recent A1C    HGBA1C Most Recent 6/10/22   Hemoglobin A1C 6.0 (A)   (A) Abnormal value       Comments are available for some flowsheets but are not being displayed.                Assessment and Plan      Diagnoses and all orders for this visit:    1. Anxiety (Primary)  Assessment & Plan:  CONTROLLED  - Cont Lexapro 20 mg daily -> refills sent today  - previous meds trialed: fluoxetine (currently on, has been up to dose to 60 mg and has had side effects), sertraline (not effective), wellbutrin (not effective)  - Rx citalopram in the past but never filled as she had heard bad effects of this medication  - Switched to Escitalopram 10 mg daily in February 2021, helped with symptoms, increased to 20 mg daily in October  - Reviewed potential side effects of medication including sexual performance changes, insomnia, fatigue, weight changes. Pt tolerating well without any issues.     Orders:  -     escitalopram  (LEXAPRO) 20 MG tablet; Take 1 tablet by mouth Daily.  Dispense: 90 tablet; Refill: 0    2. Mild persistent asthma without complication  Assessment & Plan:  CONTROLLED  - not acutely exacerbated  - Cont once daily ICS -> refills sent  - cont on singulair once daily at night -> refills sent  - cont with alb for prn use -> refills sent    Orders:  -     albuterol sulfate  (90 Base) MCG/ACT inhaler; Inhale 2 puffs Every 4 (Four) Hours As Needed for Wheezing or Shortness of Air.  Dispense: 18 g; Refill: 2  -     fluticasone (FLONASE) 50 MCG/ACT nasal spray; 2 sprays into the nostril(s) as directed by provider Daily.  Dispense: 18.2 mL; Refill: 0  -     montelukast (SINGULAIR) 10 MG tablet; Take 1 tablet by mouth Every Night.  Dispense: 90 tablet; Refill: 3  -     Fluticasone Furoate (Arnuity Ellipta) 100 MCG/ACT aerosol powder ; Inhale 1 puff Daily.  Dispense: 30 each; Refill: 5    3. Other hyperlipidemia  -     Lipid panel  -     Comprehensive Metabolic Panel    4. Pre-diabetes  Comments:  - Currently prescribed Mounjaro by outside physician  - Hgb A1C 6.0 in June, will recheck today  Orders:  -     Hemoglobin A1c  -     Comprehensive Metabolic Panel    5. Allergic rhinitis, unspecified seasonality, unspecified trigger  -     azelastine (ASTELIN) 0.1 % nasal spray; 1 spray into the nostril(s) as directed by provider Daily. Use in each nostril as directed  Dispense: 30 mL; Refill: 0  -     fluticasone (FLONASE) 50 MCG/ACT nasal spray; 2 sprays into the nostril(s) as directed by provider Daily.  Dispense: 18.2 mL; Refill: 0    6. Encounter for immunization  -     Shingrix Vaccine  -     FluLaval/Fluzone >6 mos ([id number])      Follow Up   Return in about 6 months (around 6/8/2023).    Patient was given instructions and counseling regarding her condition or for health maintenance advice. Please see specific information pulled into the AVS if appropriate.     Lashawn Page MD    Patient seen and evaluated with   [facility]. Pertinent portions of history and exam repeated. Agree with assessment and plan as above.  62-year-old female here for follow-up on anxiety and asthma.  Was changed to Lexapro with dose increase in the past year and doing much better now, continue on 20 mg dose, refill sent.  Asthma well-controlled currently, winter is typically her bad season so we will restart her ICS daily at this time.  Refill sent on Arnuity.  We will get monitoring labs today including CMP, lipid panel, A1c. Patient has been working with weight loss physician and is currently on Mounjaro, has seen great success with 20 pound weight loss so far.  Will get flu shot and shingles #1 in office today.    Erendira Green MD  [facility] [place] [facility]  Phone: [phone]  Fax: [phone]

## 2022-12-08 NOTE — ASSESSMENT & PLAN NOTE
CONTROLLED  - not acutely exacerbated  - Cont once daily ICS -> refills sent  - cont on singulair once daily at night -> refills sent  - cont with alb for prn use -> refills sent

## 2022-12-08 NOTE — ASSESSMENT & PLAN NOTE
CONTROLLED  - Cont Lexapro 20 mg daily -> refills sent today  - previous meds trialed: fluoxetine (currently on, has been up to dose to 60 mg and has had side effects), sertraline (not effective), wellbutrin (not effective)  - Rx citalopram in the past but never filled as she had heard bad effects of this medication  - Switched to Escitalopram 10 mg daily in February 2021, helped with symptoms, increased to 20 mg daily in October  - Reviewed potential side effects of medication including sexual performance changes, insomnia, fatigue, weight changes. Pt tolerating well without any issues.

## 2022-12-09 LAB
ALBUMIN SERPL-MCNC: 4.6 G/DL (ref 3.8–4.9)
ALBUMIN/GLOB SERPL: 2 {RATIO} (ref 1.2–2.2)
ALP SERPL-CCNC: 88 IU/L (ref 44–121)
ALT SERPL-CCNC: 20 IU/L (ref 0–32)
AST SERPL-CCNC: 20 IU/L (ref 0–40)
BILIRUB SERPL-MCNC: 0.4 MG/DL (ref 0–1.2)
BUN SERPL-MCNC: 16 MG/DL (ref 8–27)
BUN/CREAT SERPL: 21 (ref 12–28)
CALCIUM SERPL-MCNC: 9.7 MG/DL (ref 8.7–10.3)
CHLORIDE SERPL-SCNC: 102 MMOL/L (ref 96–106)
CHOLEST SERPL-MCNC: 201 MG/DL (ref 100–199)
CO2 SERPL-SCNC: 27 MMOL/L (ref 20–29)
CREAT SERPL-MCNC: 0.77 MG/DL (ref 0.57–1)
EGFRCR SERPLBLD CKD-EPI 2021: 88 ML/MIN/1.73
GLOBULIN SER CALC-MCNC: 2.3 G/DL (ref 1.5–4.5)
GLUCOSE SERPL-MCNC: 91 MG/DL (ref 70–99)
HBA1C MFR BLD: 5.4 % (ref 4.8–5.6)
HDLC SERPL-MCNC: 42 MG/DL
LDLC SERPL CALC-MCNC: 145 MG/DL (ref 0–99)
POTASSIUM SERPL-SCNC: 4.8 MMOL/L (ref 3.5–5.2)
PROT SERPL-MCNC: 6.9 G/DL (ref 6–8.5)
SODIUM SERPL-SCNC: 142 MMOL/L (ref 134–144)
TRIGL SERPL-MCNC: 79 MG/DL (ref 0–149)
VLDLC SERPL CALC-MCNC: 14 MG/DL (ref 5–40)

## 2022-12-21 ENCOUNTER — TELEPHONE (OUTPATIENT)
Dept: INTERNAL MEDICINE | Facility: CLINIC | Age: 62
End: 2022-12-21

## 2022-12-21 NOTE — TELEPHONE ENCOUNTER
Caller: Mary Jo Brizuela    Relationship to patient: Self    Best call back number: [phone]    Date of exposure: UNKNOWN    Date of positive COVID19 test:12/19/22    Date if possible COVID19 exposure: UNKNOWN    COVID19 symptoms: PRESSURE IN HEAD, HEADACHE, COUGH, LOSS TASTE OF SMELL    Date of initial quarantine: 12/19/22    Additional information or concerns: ANY MEDICATION TO HELP HER GET OVER THIS.      What is the patients preferred pharmacy: [facility] [id number] - [place] - [address] - [phone]  - [phone] FX

## 2023-01-05 ENCOUNTER — OFFICE VISIT (OUTPATIENT)
Dept: INTERNAL MEDICINE | Facility: CLINIC | Age: 63
End: 2023-01-05
Payer: COMMERCIAL

## 2023-01-05 VITALS
RESPIRATION RATE: 16 BRPM | BODY MASS INDEX: 32.59 KG/M2 | HEIGHT: 63 IN | OXYGEN SATURATION: 94 % | DIASTOLIC BLOOD PRESSURE: 74 MMHG | HEART RATE: 98 BPM | TEMPERATURE: 96.3 F | SYSTOLIC BLOOD PRESSURE: 118 MMHG

## 2023-01-05 DIAGNOSIS — J30.9 ALLERGIC RHINITIS, UNSPECIFIED SEASONALITY, UNSPECIFIED TRIGGER: ICD-10-CM

## 2023-01-05 DIAGNOSIS — J01.00 ACUTE NON-RECURRENT MAXILLARY SINUSITIS: Primary | ICD-10-CM

## 2023-01-05 PROCEDURE — 99214 OFFICE O/P EST MOD 30 MIN: CPT | Performed by: INTERNAL MEDICINE

## 2023-01-05 RX ORDER — AZELASTINE 1 MG/ML
1 SPRAY, METERED NASAL DAILY
Qty: 30 ML | Refills: 1 | Status: SHIPPED | OUTPATIENT
Start: 2023-01-05

## 2023-01-05 RX ORDER — AMOXICILLIN AND CLAVULANATE POTASSIUM 875; 125 MG/1; MG/1
1 TABLET, FILM COATED ORAL 2 TIMES DAILY
Qty: 20 TABLET | Refills: 0 | Status: SHIPPED | OUTPATIENT
Start: 2023-01-05 | End: 2023-01-15

## 2023-01-05 RX ORDER — METHYLPREDNISOLONE 4 MG/1
TABLET ORAL
Qty: 21 TABLET | Refills: 0 | Status: SHIPPED | OUTPATIENT
Start: 2023-01-05

## 2023-01-05 NOTE — PROGRESS NOTES
Chief Complaint  Headache and Earache (/)    Subjective        Mary Jo Brizuela presents to [facility]  History of Present Illness  Here with 2 weeks of sinus pressure and headaches. Initially with viral uri now located just in sinuses, some ear pain on the left, feels like pressure, no systemic symptoms. No recent antibiotics, no other sick contacts.    Objective   Vital Signs:  /74   Pulse 98   Temp 96.3 °F (35.7 °C)   Resp 16   Ht 160 cm (63\")   SpO2 94%   BMI 32.59 kg/m²   Estimated body mass index is 32.59 kg/m² as calculated from the following:    Height as of this encounter: 160 cm (63\").    Weight as of 12/8/22: 83.5 kg (184 lb).    Physical Exam  Vitals and nursing note reviewed.   Constitutional:       General: She is not in acute distress.     Appearance: Normal appearance.   HENT:      Head: Normocephalic and atraumatic.      Comments: Maxillary and frontal sinus tenderness     Right Ear: Ear canal and external ear normal.      Left Ear: Ear canal and external ear normal.      Ears:      Comments: Bilateral serous effusions     Nose: Nose normal. No congestion.      Mouth/Throat:      Mouth: Mucous membranes are moist.      Pharynx: No oropharyngeal exudate or posterior oropharyngeal erythema.   Eyes:      Extraocular Movements: Extraocular movements intact.      Conjunctiva/sclera: Conjunctivae normal.      Pupils: Pupils are equal, round, and reactive to light.   Cardiovascular:      Rate and Rhythm: Normal rate and regular rhythm.      Pulses: Normal pulses.      Heart sounds: Normal heart sounds. No murmur heard.  Pulmonary:      Effort: Pulmonary effort is normal. No respiratory distress.      Breath sounds: Normal breath sounds. No wheezing or rales.   Abdominal:      General: Abdomen is flat. Bowel sounds are normal. There is no distension.      Palpations: Abdomen is soft.      Tenderness: There is no abdominal tenderness.    Musculoskeletal:      Cervical back: Normal range of motion and neck supple. No rigidity.   Lymphadenopathy:      Cervical: No cervical adenopathy.   Skin:     General: Skin is warm.      Capillary Refill: Capillary refill takes less than 2 seconds.   Neurological:      General: No focal deficit present.      Mental Status: She is alert and oriented to person, place, and time. Mental status is at baseline.      Cranial Nerves: No cranial nerve deficit.   Psychiatric:         Mood and Affect: Mood normal.         Behavior: Behavior normal.         Thought Content: Thought content normal.        Result Review :  The following data was reviewed by: Chad Pelaez MD on 01/05/2023:  Common labs    Common Labs 6/10/22 6/10/22 6/10/22 6/10/22 12/8/22 12/8/22 12/8/22    1119 1119 1119 1119 0000 0000 0000   Glucose  119 (A)     91   BUN  16     16   Creatinine  0.80     0.77   Sodium  141     142   Potassium  4.5     4.8   Chloride  103     102   Calcium  9.8     9.7   Total Protein  6.8     6.9   Albumin  4.7     4.6   Total Bilirubin  0.5     0.4   Alkaline Phosphatase  84     88   AST (SGOT)  15     20   ALT (SGPT)  16     20   WBC 7.8         Hemoglobin 14.2         Hematocrit 41.9         Platelets 214         Total Cholesterol    237 (A) 201 (A)     Triglycerides    79 79     HDL Cholesterol    46 42     LDL Cholesterol     177 (A) 145 (A)     Hemoglobin A1C   6.0 (A)   5.4    (A) Abnormal value       Comments are available for some flowsheets but are not being displayed.           Data reviewed: prior office notes reviewed          Assessment and Plan      Mary Jo Brizuela is a 62 y.o. female presenting with acute sinusitis, start augmentin, trial course of steroids, astelin for nasal congestion. Return precautions discussed.    Diagnoses and all orders for this visit:    1. Acute non-recurrent maxillary sinusitis (Primary)  -     amoxicillin-clavulanate (Augmentin) 875-125 MG per tablet; Take 1 tablet by mouth 2 (Two)  Times a Day for 10 days.  Dispense: 20 tablet; Refill: 0  -     methylPREDNISolone (MEDROL) 4 MG dose pack; Take as directed on package instructions.  Dispense: 21 tablet; Refill: 0    2. Allergic rhinitis, unspecified seasonality, unspecified trigger  -     azelastine (ASTELIN) 0.1 % nasal spray; 1 spray into the nostril(s) as directed by provider Daily. Use in each nostril as directed  Dispense: 30 mL; Refill: 1      I spent 30 minutes caring for Mary Jo on this date of service. This time includes time spent by me in the following activities:preparing for the visit, reviewing tests, obtaining and/or reviewing a separately obtained history, performing a medically appropriate examination and/or evaluation , counseling and educating the patient/family/caregiver, ordering medications, tests, or procedures and documenting information in the medical record  Follow Up   Return for Next scheduled follow up.  Patient was given instructions and counseling regarding her condition or for health maintenance advice. Please see specific information pulled into the AVS if appropriate.        -IV reglan; discussed side effects including tardive dyskinesia which can be permanent   -Protonix 40 IV daily   -Limit narcotic pain medication; discussed that this can worsen gastric emptying   -Advance diet as tolerated; small bites, low fat  -Marijuana avoidance discussed   -Limit CT scan exposure discussed   **Will need close outpatient f/u ABDOMINAL PAIN

## 2023-01-08 DIAGNOSIS — J30.9 ALLERGIC RHINITIS, UNSPECIFIED SEASONALITY, UNSPECIFIED TRIGGER: ICD-10-CM

## 2023-01-08 DIAGNOSIS — J45.30 MILD PERSISTENT ASTHMA WITHOUT COMPLICATION: Chronic | ICD-10-CM

## 2023-01-08 RX ORDER — FLUTICASONE PROPIONATE 50 MCG
SPRAY, SUSPENSION (ML) NASAL
Qty: 16 ML | Refills: 6 | Status: SHIPPED | OUTPATIENT
Start: 2023-01-08

## 2023-05-18 DIAGNOSIS — F41.9 ANXIETY: Chronic | ICD-10-CM

## 2023-05-18 RX ORDER — ESCITALOPRAM OXALATE 20 MG/1
TABLET ORAL
Qty: 90 TABLET | Refills: 0 | Status: SHIPPED | OUTPATIENT
Start: 2023-05-18

## 2023-06-13 ENCOUNTER — OFFICE VISIT (OUTPATIENT)
Dept: INTERNAL MEDICINE | Facility: CLINIC | Age: 63
End: 2023-06-13
Payer: COMMERCIAL

## 2023-06-13 VITALS
TEMPERATURE: 96.1 F | SYSTOLIC BLOOD PRESSURE: 118 MMHG | HEART RATE: 101 BPM | BODY MASS INDEX: 33.66 KG/M2 | DIASTOLIC BLOOD PRESSURE: 76 MMHG | RESPIRATION RATE: 16 BRPM | WEIGHT: 190 LBS | OXYGEN SATURATION: 99 % | HEIGHT: 63 IN

## 2023-06-13 DIAGNOSIS — R73.03 PRE-DIABETES: ICD-10-CM

## 2023-06-13 DIAGNOSIS — I83.813 VARICOSE VEINS OF BOTH LOWER EXTREMITIES WITH PAIN: ICD-10-CM

## 2023-06-13 DIAGNOSIS — E55.9 VITAMIN D DEFICIENCY: Chronic | ICD-10-CM

## 2023-06-13 DIAGNOSIS — Z00.00 ROUTINE GENERAL MEDICAL EXAMINATION AT A HEALTH CARE FACILITY: Primary | ICD-10-CM

## 2023-06-13 DIAGNOSIS — Z12.31 ENCOUNTER FOR SCREENING MAMMOGRAM FOR MALIGNANT NEOPLASM OF BREAST: ICD-10-CM

## 2023-06-13 DIAGNOSIS — H81.09 MENIERE'S DISEASE, UNSPECIFIED LATERALITY: ICD-10-CM

## 2023-06-13 DIAGNOSIS — J30.9 ALLERGIC RHINITIS, UNSPECIFIED SEASONALITY, UNSPECIFIED TRIGGER: ICD-10-CM

## 2023-06-13 DIAGNOSIS — E78.49 OTHER HYPERLIPIDEMIA: ICD-10-CM

## 2023-06-13 DIAGNOSIS — J45.30 MILD PERSISTENT ASTHMA WITHOUT COMPLICATION: Chronic | ICD-10-CM

## 2023-06-13 DIAGNOSIS — F41.9 ANXIETY: Chronic | ICD-10-CM

## 2023-06-13 DIAGNOSIS — Z12.11 COLON CANCER SCREENING: ICD-10-CM

## 2023-06-13 LAB
BILIRUB BLD-MCNC: NEGATIVE MG/DL
CLARITY, POC: CLEAR
COLOR UR: YELLOW
EXPIRATION DATE: NORMAL
GLUCOSE UR STRIP-MCNC: NEGATIVE MG/DL
KETONES UR QL: NEGATIVE
LEUKOCYTE EST, POC: NEGATIVE
Lab: NORMAL
NITRITE UR-MCNC: NEGATIVE MG/ML
PH UR: 5 [PH] (ref 5–8)
PROT UR STRIP-MCNC: NEGATIVE MG/DL
RBC # UR STRIP: NEGATIVE /UL
SP GR UR: 1.02 (ref 1–1.03)
UROBILINOGEN UR QL: NORMAL

## 2023-06-13 RX ORDER — FLUTICASONE PROPIONATE 50 MCG
2 SPRAY, SUSPENSION (ML) NASAL DAILY
Qty: 16 ML | Refills: 6 | Status: SHIPPED | OUTPATIENT
Start: 2023-06-13

## 2023-06-13 RX ORDER — ESCITALOPRAM OXALATE 20 MG/1
20 TABLET ORAL DAILY
Qty: 90 TABLET | Refills: 1 | Status: SHIPPED | OUTPATIENT
Start: 2023-06-13

## 2023-06-13 RX ORDER — MONTELUKAST SODIUM 10 MG/1
10 TABLET ORAL NIGHTLY
Qty: 90 TABLET | Refills: 3 | Status: SHIPPED | OUTPATIENT
Start: 2023-06-13

## 2023-06-13 RX ORDER — LORATADINE 10 MG/1
10 CAPSULE, LIQUID FILLED ORAL DAILY
Qty: 90 EACH | Refills: 3 | Status: SHIPPED | OUTPATIENT
Start: 2023-06-13

## 2023-06-13 RX ORDER — AZELASTINE 1 MG/ML
1 SPRAY, METERED NASAL DAILY
Qty: 30 ML | Refills: 5 | Status: SHIPPED | OUTPATIENT
Start: 2023-06-13

## 2023-06-13 NOTE — PROGRESS NOTES
Chief Complaint   Patient presents with    Annual Exam     Subjective   History of Present Illness    Mary Jo Brizuela is a 62 y.o. female here for annual wellness. Pt does have acute issues to discuss today. States overall things are going well. Taking all meds as prescribed without any issues.     Has history of varicose vein. Felt a new one pop up on her right calf while at the beach last week and thinks it has ruptured. She has bruising at the site as well as a knot. It has been tender. She does wear compression stockings when she travels, but she does not wear them otherwise. Would like to be referred to Vascular Surgery for further evaluation.     Remains on Lexapro 20mg daily. This dose has been working well. No issues or side effects. Has been controlling symptoms.     The following portions of the patient's history were reviewed and updated as appropriate: allergies, current medications, past family history, past medical history, past social history, past surgical history, and problem list.    Patient Care Team:  Rebecca Green MD as PCP - General (Internal Medicine & Pediatrics)    Review of Systems   Constitutional:  Negative for appetite change, chills and fever.   HENT:  Positive for hearing loss. Negative for congestion and tinnitus.    Respiratory:  Negative for cough and shortness of breath.    Cardiovascular:  Negative for chest pain and palpitations.   Gastrointestinal:  Negative for abdominal pain, constipation and diarrhea.   Genitourinary:  Negative for dysuria and hematuria.   Musculoskeletal:  Negative for back pain and joint swelling.   Skin:  Negative for color change and rash.   Neurological:  Positive for dizziness. Negative for weakness and headache.   Psychiatric/Behavioral:  Negative for depressed mood. The patient is not nervous/anxious.      Body mass index is 33.66 kg/m².   Vitals:    06/13/23 1039   BP: 118/76   Pulse: 101   Resp: 16   Temp: 96.1 °F (35.6 °C)   SpO2: 99%  "  Weight: 86.2 kg (190 lb)   Height: 160 cm (63\")      Objective   Physical Exam  Vitals and nursing note reviewed.   Constitutional:       General: She is not in acute distress.     Appearance: Normal appearance.   Eyes:      General: No scleral icterus.     Conjunctiva/sclera: Conjunctivae normal.   Cardiovascular:      Rate and Rhythm: Normal rate and regular rhythm.      Heart sounds: Normal heart sounds. No murmur heard.  Pulmonary:      Effort: Pulmonary effort is normal. No respiratory distress.      Breath sounds: Normal breath sounds.   Abdominal:      General: Bowel sounds are normal. There is no distension.      Palpations: Abdomen is soft.   Musculoskeletal:         General: No swelling.      Right lower leg: No edema.      Left lower leg: No edema.      Comments: Bruising on LLE   Neurological:      General: No focal deficit present.      Mental Status: She is alert. Mental status is at baseline.     Brief Urine Lab Results  (Last result in the past 365 days)        Color   Clarity   Blood   Leuk Est   Nitrite   Protein   CREAT   Urine HCG        06/13/23 1209 Yellow   Clear   Negative   Negative   Negative   Negative                   Current Outpatient Medications:     albuterol sulfate  (90 Base) MCG/ACT inhaler, Inhale 2 puffs Every 4 (Four) Hours As Needed for Wheezing or Shortness of Air., Disp: 18 g, Rfl: 2    aspirin 81 MG tablet, Take 1 tablet by mouth Daily., Disp: 30 tablet, Rfl: 0    azelastine (ASTELIN) 0.1 % nasal spray, 1 spray into the nostril(s) as directed by provider Daily. Use in each nostril as directed, Disp: 30 mL, Rfl: 5    benzonatate (Tessalon Perles) 100 MG capsule, Take 1-2 capsules every 8 hours as needed for cough, Disp: 30 capsule, Rfl: 0    Biotin 1 MG capsule, Take  by mouth Every Other Day., Disp: , Rfl:     escitalopram (LEXAPRO) 20 MG tablet, Take 1 tablet by mouth Daily., Disp: 90 tablet, Rfl: 1    fluticasone (FLONASE) 50 MCG/ACT nasal spray, 2 sprays by Each " Nare route Daily., Disp: 16 mL, Rfl: 6    Fluticasone Furoate (Arnuity Ellipta) 100 MCG/ACT aerosol powder , Inhale 1 puff Daily., Disp: 30 each, Rfl: 5    Loratadine 10 MG capsule, Take 1 capsule by mouth Daily., Disp: 90 each, Rfl: 3    methylPREDNISolone (MEDROL) 4 MG dose pack, Take as directed on package instructions., Disp: 21 tablet, Rfl: 0    montelukast (SINGULAIR) 10 MG tablet, Take 1 tablet by mouth Every Night., Disp: 90 tablet, Rfl: 3    polyethylene glycol (GoLYTELY) 236 g solution, Start 45 minutes after drinking a bottle of mag citrate.  Drink 6 to 8 ounces every 15 to 20 minutes until several good bowel movements have occurred., Disp: 4000 mL, Rfl: 0    VITAMIN D, ERGOCALCIFEROL, PO, Take 2,000 Units by mouth Every Night., Disp: , Rfl:     Semaglutide-Weight Management 0.25 MG/0.5ML solution auto-injector, Inject 0.25 mg under the skin into the appropriate area as directed 1 (One) Time Per Week., Disp: , Rfl:      Lab Results   Component Value Date    HGBA1C 5.4 12/08/2022    TSH 2.110 06/10/2022    QAZR04NN 49.9 06/10/2022        Health Maintenance   Topic Date Due    COVID-19 Vaccine (3 - Pfizer series) 10/05/2021    ANNUAL PHYSICAL  06/10/2023    INFLUENZA VACCINE  10/01/2023    LIPID PANEL  12/08/2023    COLORECTAL CANCER SCREENING  12/28/2023    MAMMOGRAM  04/19/2024    PAP SMEAR  06/13/2024    TDAP/TD VACCINES (3 - Td or Tdap) 04/21/2030    HEPATITIS C SCREENING  Completed    Pneumococcal Vaccine 0-64  Completed    ZOSTER VACCINE  Completed        Immunization History   Administered Date(s) Administered    COVID-19 (PFIZER) Purple Cap Monovalent 07/20/2021, 08/10/2021    FluLaval/Fluzone >6mos 12/08/2022    Pneumococcal Conjugate 20-Valent (PCV20) 06/10/2022    Shingrix 06/10/2022, 12/08/2022    Tdap 10/18/2016, 04/21/2020       Assessment & Plan     Annual Wellness  - Labs ordered today including CBC, CMP, Fasting lipid panel, HgbA1C, TSH, and Vit D. Will call with results once available and  make follow up plan and med changes as appropriate.   - Cont current medications for chronic medical issues, refills sent as needed today.   - EKG not indicated  - PAP smear up to date and normal. Last done 2019 with HPV testing  . Due 2024.  - Mammo up to date and normal. Last done April 2022  - Cscope not done; Cologuard done in 12/2020 and normal.   - DEXA Last done June 2022 and abnormal with osteopenia  - Lung CA screening not indicated  - Immunizations: Patient declined COVID-19 booster; immunizations otherwise UTD   - Depression screen reviewed with patient and negative.  - Advised behavioral modifications including dietary changes and increased exercise with goal of healthy weight and lifestyle.    Diagnoses and all orders for this visit:    1. Routine general medical examination at a health care facility (Primary)  -     CBC & Differential  -     Comprehensive Metabolic Panel  -     Hemoglobin A1c  -     Lipid Panel  -     TSH  -     POC Urinalysis Dipstick, Automated    2. Other hyperlipidemia  -     Lipid Panel    3. Pre-diabetes  -     Hemoglobin A1c    4. Anxiety  Assessment & Plan:  CONTROLLED  - Cont Lexapro 20 mg daily -> refills sent today  - previous meds trialed: fluoxetine (currently on, has been up to dose to 60 mg and has had side effects), sertraline (not effective), wellbutrin (not effective)  - Rx citalopram in the past but never filled as she had heard bad effects of this medication  - Switched to Escitalopram 10 mg daily in February 2021, helped with symptoms, increased to 20 mg daily in October  - Reviewed potential side effects of medication including sexual performance changes, insomnia, fatigue, weight changes. Pt tolerating well without any issues.     Orders:  -     escitalopram (LEXAPRO) 20 MG tablet; Take 1 tablet by mouth Daily.  Dispense: 90 tablet; Refill: 1    5. Vitamin D deficiency  -     Vitamin D,25-Hydroxy    6. Mild persistent asthma without complication  -      montelukast (SINGULAIR) 10 MG tablet; Take 1 tablet by mouth Every Night.  Dispense: 90 tablet; Refill: 3  -     fluticasone (FLONASE) 50 MCG/ACT nasal spray; 2 sprays by Each Nare route Daily.  Dispense: 16 mL; Refill: 6    7. Allergic rhinitis, unspecified seasonality, unspecified trigger  -     montelukast (SINGULAIR) 10 MG tablet; Take 1 tablet by mouth Every Night.  Dispense: 90 tablet; Refill: 3  -     azelastine (ASTELIN) 0.1 % nasal spray; 1 spray into the nostril(s) as directed by provider Daily. Use in each nostril as directed  Dispense: 30 mL; Refill: 5  -     fluticasone (FLONASE) 50 MCG/ACT nasal spray; 2 sprays by Each Nare route Daily.  Dispense: 16 mL; Refill: 6  -     Loratadine 10 MG capsule; Take 1 capsule by mouth Daily.  Dispense: 90 each; Refill: 3    8. Encounter for screening mammogram for malignant neoplasm of breast  -     Mammo Screening Bilateral With CAD; Future    9. Colon cancer screening  -     Cologuard - Stool, Per Rectum; Future    10. Varicose veins of both lower extremities with pain  -     Ambulatory Referral to Vascular Surgery    11. Meniere's disease, unspecified laterality  -     Ambulatory Referral to ENT (Otolaryngology)    Other orders  -     Semaglutide-Weight Management 0.25 MG/0.5ML solution auto-injector; Inject 0.25 mg under the skin into the appropriate area as directed 1 (One) Time Per Week.           Return in about 6 months (around 12/13/2023) for follow up.     Emliy Atwood MD  Internal Medicine and Pediatrics, PGY-4    Patient seen and evaluated with Dr. Atwood. Pertinent portions of history and exam repeated. Agree with assessment and plan as above. 63 yo F here for annual wellness. Overall doing well, med refills sent on chronic meds, no changes required at this time. Will get basic screening and follow up labs today. PAP UTD, will repeat next year. Mammo ordered. Cologuard to be repeated in Dec for CRC screening DEXA UTD. Imm UTD at this time other than  COVID booster.   Referrals placed for vascular surgery for eval of painful and swollen varicose veins in BLE and ENT for eval of possible meneires disease.     Erendira Green MD  [facility] [place] [facility]  Phone: [phone]  Fax: [phone]

## 2023-06-14 LAB
25(OH)D3+25(OH)D2 SERPL-MCNC: 39.6 NG/ML (ref 30–100)
ALBUMIN SERPL-MCNC: 4.8 G/DL (ref 3.5–5.2)
ALBUMIN/GLOB SERPL: 2.5 G/DL
ALP SERPL-CCNC: 86 U/L (ref 39–117)
ALT SERPL-CCNC: 12 U/L (ref 1–33)
AST SERPL-CCNC: 14 U/L (ref 1–32)
BASOPHILS # BLD AUTO: 0.08 10*3/MM3 (ref 0–0.2)
BASOPHILS NFR BLD AUTO: 0.9 % (ref 0–1.5)
BILIRUB SERPL-MCNC: 0.3 MG/DL (ref 0–1.2)
BUN SERPL-MCNC: 15 MG/DL (ref 8–23)
BUN/CREAT SERPL: 19.5 (ref 7–25)
CALCIUM SERPL-MCNC: 10.2 MG/DL (ref 8.6–10.5)
CHLORIDE SERPL-SCNC: 103 MMOL/L (ref 98–107)
CHOLEST SERPL-MCNC: 203 MG/DL (ref 0–200)
CO2 SERPL-SCNC: 28.8 MMOL/L (ref 22–29)
CREAT SERPL-MCNC: 0.77 MG/DL (ref 0.57–1)
EGFRCR SERPLBLD CKD-EPI 2021: 88.4 ML/MIN/1.73
EOSINOPHIL # BLD AUTO: 0.22 10*3/MM3 (ref 0–0.4)
EOSINOPHIL NFR BLD AUTO: 2.6 % (ref 0.3–6.2)
ERYTHROCYTE [DISTWIDTH] IN BLOOD BY AUTOMATED COUNT: 12.7 % (ref 12.3–15.4)
GLOBULIN SER CALC-MCNC: 1.9 GM/DL
GLUCOSE SERPL-MCNC: 91 MG/DL (ref 65–99)
HBA1C MFR BLD: 5.6 % (ref 4.8–5.6)
HCT VFR BLD AUTO: 43.2 % (ref 34–46.6)
HDLC SERPL-MCNC: 50 MG/DL (ref 40–60)
HGB BLD-MCNC: 14.1 G/DL (ref 12–15.9)
IMM GRANULOCYTES # BLD AUTO: 0.03 10*3/MM3 (ref 0–0.05)
IMM GRANULOCYTES NFR BLD AUTO: 0.4 % (ref 0–0.5)
LDLC SERPL CALC-MCNC: 134 MG/DL (ref 0–100)
LYMPHOCYTES # BLD AUTO: 2.26 10*3/MM3 (ref 0.7–3.1)
LYMPHOCYTES NFR BLD AUTO: 26.4 % (ref 19.6–45.3)
MCH RBC QN AUTO: 30.7 PG (ref 26.6–33)
MCHC RBC AUTO-ENTMCNC: 32.6 G/DL (ref 31.5–35.7)
MCV RBC AUTO: 93.9 FL (ref 79–97)
MONOCYTES # BLD AUTO: 0.52 10*3/MM3 (ref 0.1–0.9)
MONOCYTES NFR BLD AUTO: 6.1 % (ref 5–12)
NEUTROPHILS # BLD AUTO: 5.46 10*3/MM3 (ref 1.7–7)
NEUTROPHILS NFR BLD AUTO: 63.6 % (ref 42.7–76)
NRBC BLD AUTO-RTO: 0 /100 WBC (ref 0–0.2)
PLATELET # BLD AUTO: 223 10*3/MM3 (ref 140–450)
POTASSIUM SERPL-SCNC: 4.3 MMOL/L (ref 3.5–5.2)
PROT SERPL-MCNC: 6.7 G/DL (ref 6–8.5)
RBC # BLD AUTO: 4.6 10*6/MM3 (ref 3.77–5.28)
SODIUM SERPL-SCNC: 142 MMOL/L (ref 136–145)
TRIGL SERPL-MCNC: 103 MG/DL (ref 0–150)
TSH SERPL DL<=0.005 MIU/L-ACNC: 2.43 UIU/ML (ref 0.27–4.2)
VLDLC SERPL CALC-MCNC: 19 MG/DL (ref 5–40)
WBC # BLD AUTO: 8.57 10*3/MM3 (ref 3.4–10.8)

## 2023-11-21 ENCOUNTER — OFFICE VISIT (OUTPATIENT)
Dept: INTERNAL MEDICINE | Facility: CLINIC | Age: 63
End: 2023-11-21
Payer: COMMERCIAL

## 2023-11-21 VITALS
BODY MASS INDEX: 34.73 KG/M2 | SYSTOLIC BLOOD PRESSURE: 120 MMHG | WEIGHT: 196 LBS | HEIGHT: 63 IN | TEMPERATURE: 95.8 F | RESPIRATION RATE: 16 BRPM | OXYGEN SATURATION: 97 % | HEART RATE: 95 BPM | DIASTOLIC BLOOD PRESSURE: 74 MMHG

## 2023-11-21 DIAGNOSIS — J30.9 ALLERGIC RHINITIS, UNSPECIFIED SEASONALITY, UNSPECIFIED TRIGGER: ICD-10-CM

## 2023-11-21 DIAGNOSIS — J45.30 MILD PERSISTENT ASTHMA WITHOUT COMPLICATION: Chronic | ICD-10-CM

## 2023-11-21 DIAGNOSIS — N30.90 CYSTITIS: ICD-10-CM

## 2023-11-21 DIAGNOSIS — F41.9 ANXIETY: Primary | Chronic | ICD-10-CM

## 2023-11-21 PROCEDURE — 99214 OFFICE O/P EST MOD 30 MIN: CPT | Performed by: INTERNAL MEDICINE

## 2023-11-21 RX ORDER — ALBUTEROL SULFATE 90 UG/1
2 AEROSOL, METERED RESPIRATORY (INHALATION) EVERY 4 HOURS PRN
Qty: 18 G | Refills: 2 | Status: SHIPPED | OUTPATIENT
Start: 2023-11-21

## 2023-11-21 RX ORDER — ESCITALOPRAM OXALATE 20 MG/1
20 TABLET ORAL DAILY
Qty: 90 TABLET | Refills: 1 | Status: SHIPPED | OUTPATIENT
Start: 2023-11-21 | End: 2023-11-21 | Stop reason: SDUPTHER

## 2023-11-21 RX ORDER — NITROFURANTOIN 25; 75 MG/1; MG/1
100 CAPSULE ORAL 2 TIMES DAILY
Qty: 10 CAPSULE | Refills: 0 | Status: SHIPPED | OUTPATIENT
Start: 2023-11-21

## 2023-11-21 RX ORDER — MONTELUKAST SODIUM 10 MG/1
10 TABLET ORAL NIGHTLY
Qty: 90 TABLET | Refills: 3 | Status: SHIPPED | OUTPATIENT
Start: 2023-11-21

## 2023-11-21 RX ORDER — ESCITALOPRAM OXALATE 20 MG/1
20 TABLET ORAL DAILY
Qty: 90 TABLET | Refills: 2 | Status: SHIPPED | OUTPATIENT
Start: 2023-11-21

## 2023-11-21 RX ORDER — FLUTICASONE PROPIONATE 50 MCG
SPRAY, SUSPENSION (ML) NASAL
Qty: 16 ML | Refills: 6 | Status: SHIPPED | OUTPATIENT
Start: 2023-11-21

## 2023-11-21 RX ORDER — FLUTICASONE FUROATE 100 UG/1
1 POWDER RESPIRATORY (INHALATION) DAILY
Qty: 30 EACH | Refills: 5 | Status: SHIPPED | OUTPATIENT
Start: 2023-11-21

## 2023-11-21 NOTE — PROGRESS NOTES
"Chief Complaint  Follow-up, Anxiety, and foul smelling urine    Subjective          Mary Jo Brizuela presents to [facility] for follow up and med refills on anxiety.   Also with malodorous urine. Started having urinary symptoms 3 days after steroids which is typical for her. Increased frequency.     Objective   Vital Signs:     /74   Pulse 95   Temp 95.8 °F (35.4 °C)   Resp 16   Ht 160 cm (63\")   Wt 88.9 kg (196 lb)   SpO2 97%   BMI 34.72 kg/m²     Physical Exam  Vitals and nursing note reviewed.   Constitutional:       General: She is not in acute distress.     Appearance: Normal appearance.   Cardiovascular:      Rate and Rhythm: Normal rate and regular rhythm.      Pulses: Normal pulses.      Heart sounds: Normal heart sounds. No murmur heard.  Pulmonary:      Effort: Pulmonary effort is normal. No respiratory distress.      Breath sounds: Normal breath sounds.   Abdominal:      General: Abdomen is flat. Bowel sounds are normal.      Palpations: Abdomen is soft.      Tenderness: There is no abdominal tenderness. There is no right CVA tenderness or left CVA tenderness.   Musculoskeletal:      Right lower leg: No edema.      Left lower leg: No edema.   Neurological:      Mental Status: She is alert and oriented to person, place, and time. Mental status is at baseline.   Psychiatric:         Mood and Affect: Mood normal.         Behavior: Behavior normal.          Result Review :   The following data was reviewed by: Rebecca Green MD on 11/21/2023:  CMP          12/8/2022    00:00 6/13/2023    11:43   CMP   Glucose 91  91    BUN 16  15    Creatinine 0.77  0.77    Sodium 142  142    Potassium 4.8  4.3    Chloride 102  103    Calcium 9.7  10.2    Total Protein 6.9  6.7    Albumin 4.6  4.8    Globulin 2.3  1.9    Total Bilirubin 0.4  0.3    Alkaline Phosphatase 88  86    AST (SGOT) 20  14    ALT (SGPT) 20  12    BUN/Creatinine Ratio 21  19.5      CBC " w/diff          6/13/2023    11:43   CBC w/Diff   WBC 8.57    RBC 4.60    Hemoglobin 14.1    Hematocrit 43.2    MCV 93.9    MCH 30.7    MCHC 32.6    RDW 12.7    Platelets 223    Neutrophil Rel % 63.6    Lymphocyte Rel % 26.4    Monocyte Rel % 6.1    Eosinophil Rel % 2.6    Basophil Rel % 0.9      Lipid Panel          12/8/2022    00:00 6/13/2023    11:43   Lipid Panel   Total Cholesterol 201  203    Triglycerides 79  103    HDL Cholesterol 42  50    VLDL Cholesterol 14  19    LDL Cholesterol  145  134      TSH          6/13/2023    11:43   TSH   TSH 2.430      Most Recent A1C          6/13/2023    11:43   HGBA1C Most Recent   Hemoglobin A1C 5.60         Assessment and Plan      Diagnoses and all orders for this visit:    1. Anxiety (Primary)  Assessment & Plan:  IMPROVED  - cont on lexapro at 20 mg daily--> refills sent  - Reviewed potential side effects of medication including sexual performance changes, insomnia, fatigue, weight changes. Pt tolerating well without any issues.  - previous meds trialed: fluoxetine (currently on, has been up to dose to 60 mg and has had side effects), sertraline (not effective), wellbutrin (not effective)    Orders:  -     escitalopram (LEXAPRO) 20 MG tablet; Take 1 tablet by mouth Daily.  Dispense: 90 tablet; Refill: 2    2. Mild persistent asthma without complication  Assessment & Plan:  STABLE  - had been stable on just prn albuterol prior to her first COVID infection, never got back to her baseline  - has ICS for daily use, typically begins during bad seasons or when ill  - cont on singulair once daily at night  - cont with alb for prn use      Orders:  -     albuterol sulfate  (90 Base) MCG/ACT inhaler; Inhale 2 puffs Every 4 (Four) Hours As Needed for Wheezing or Shortness of Air.  Dispense: 18 g; Refill: 2  -     Fluticasone Furoate (Arnuity Ellipta) 100 MCG/ACT aerosol powder ; Inhale 1 puff Daily.  Dispense: 30 each; Refill: 5  -     montelukast (SINGULAIR) 10 MG  tablet; Take 1 tablet by mouth Every Night.  Dispense: 90 tablet; Refill: 3    3. Allergic rhinitis, unspecified seasonality, unspecified trigger  -     montelukast (SINGULAIR) 10 MG tablet; Take 1 tablet by mouth Every Night.  Dispense: 90 tablet; Refill: 3    4. Cystitis  -     nitrofurantoin, macrocrystal-monohydrate, (Macrobid) 100 MG capsule; Take 1 capsule by mouth 2 (Two) Times a Day.  Dispense: 10 capsule; Refill: 0        Follow Up   Return in about 7 months (around 6/21/2024) for Annual physical.    Patient was given instructions and counseling regarding her condition or for health maintenance advice. Please see specific information pulled into the AVS if appropriate.     Erendira Green MD  [facility] [place] [facility]  Phone: [phone]  Fax: [phone]

## 2023-11-21 NOTE — ASSESSMENT & PLAN NOTE
STABLE  - had been stable on just prn albuterol prior to her first COVID infection, never got back to her baseline  - has ICS for daily use, typically begins during bad seasons or when ill  - cont on singulair once daily at night  - cont with alb for prn use

## 2023-11-21 NOTE — ASSESSMENT & PLAN NOTE
IMPROVED  - cont on lexapro at 20 mg daily--> refills sent  - Reviewed potential side effects of medication including sexual performance changes, insomnia, fatigue, weight changes. Pt tolerating well without any issues.  - previous meds trialed: fluoxetine (currently on, has been up to dose to 60 mg and has had side effects), sertraline (not effective), wellbutrin (not effective)

## 2024-02-22 ENCOUNTER — OFFICE VISIT (OUTPATIENT)
Dept: INTERNAL MEDICINE | Facility: CLINIC | Age: 64
End: 2024-02-22
Payer: COMMERCIAL

## 2024-02-22 VITALS
HEART RATE: 62 BPM | BODY MASS INDEX: 35.61 KG/M2 | DIASTOLIC BLOOD PRESSURE: 66 MMHG | WEIGHT: 201 LBS | SYSTOLIC BLOOD PRESSURE: 126 MMHG | OXYGEN SATURATION: 98 % | HEIGHT: 63 IN | TEMPERATURE: 97.7 F

## 2024-02-22 DIAGNOSIS — L60.8 CHANGE IN NAIL APPEARANCE: ICD-10-CM

## 2024-02-22 DIAGNOSIS — R63.5 WEIGHT GAIN: ICD-10-CM

## 2024-02-22 DIAGNOSIS — R53.83 OTHER FATIGUE: ICD-10-CM

## 2024-02-22 DIAGNOSIS — E55.9 VITAMIN D DEFICIENCY: ICD-10-CM

## 2024-02-22 DIAGNOSIS — R73.03 PRE-DIABETES: Primary | ICD-10-CM

## 2024-02-22 DIAGNOSIS — Z12.31 ENCOUNTER FOR SCREENING MAMMOGRAM FOR MALIGNANT NEOPLASM OF BREAST: ICD-10-CM

## 2024-02-22 NOTE — PROGRESS NOTES
"Chief Complaint  Establish Care, asthma, anxiety    Subjective        Mary Jo Brizuela presents to [facility]  History of Present Illness    Ms. Brizuela is a natalya 62 yo F who presents to establish care and discuss asthma, anxiety, and establishing care.     Objective   Vital Signs:  /66 (BP Location: Left arm, Patient Position: Sitting, Cuff Size: Large Adult)   Pulse 62   Temp 97.7 °F (36.5 °C) (Infrared)   Ht 160 cm (62.99\")   Wt 91.2 kg (201 lb)   SpO2 98%   BMI 35.61 kg/m²   Estimated body mass index is 35.61 kg/m² as calculated from the following:    Height as of this encounter: 160 cm (62.99\").    Weight as of this encounter: 91.2 kg (201 lb).               Physical Exam  Vitals reviewed.   Constitutional:       General: She is not in acute distress.     Appearance: Normal appearance.   HENT:      Head: Normocephalic and atraumatic.      Nose: Nose normal.      Mouth/Throat:      Mouth: Mucous membranes are moist.   Eyes:      Conjunctiva/sclera: Conjunctivae normal.   Cardiovascular:      Rate and Rhythm: Normal rate and regular rhythm.      Pulses: Normal pulses.      Heart sounds: Normal heart sounds.   Pulmonary:      Effort: Pulmonary effort is normal.      Breath sounds: Normal breath sounds.   Abdominal:      Palpations: Abdomen is soft.      Tenderness: There is no abdominal tenderness.   Musculoskeletal:      Right lower leg: No edema.      Left lower leg: No edema.   Skin:     General: Skin is warm and dry.   Neurological:      General: No focal deficit present.      Mental Status: She is alert.   Psychiatric:         Mood and Affect: Mood normal.        Result Review :    The following data was reviewed by: Winter Danielle MD on 02/22/2024:  Common labs          6/13/2023    11:43 2/22/2024    14:03   Common Labs   Glucose 91     BUN 15     Creatinine 0.77     Sodium 142     Potassium 4.3     Chloride 103     Calcium 10.2     Total Protein 6.7     Albumin 4.8 "     Total Bilirubin 0.3     Alkaline Phosphatase 86     AST (SGOT) 14     ALT (SGPT) 12     WBC 8.57     Hemoglobin 14.1     Hematocrit 43.2     Platelets 223     Total Cholesterol 203     Triglycerides 103     HDL Cholesterol 50     LDL Cholesterol  134     Hemoglobin A1C 5.60  6.1      Data reviewed : last labs in system, Dr. Green's last note             Assessment and Plan     Diagnoses and all orders for this visit:    1. Pre-diabetes (Primary)  -     Hemoglobin A1c  -     Tirzepatide-Weight Management (ZEPBOUND) 2.5 MG/0.5ML solution auto-injector; Inject 0.5 mL under the skin into the appropriate area as directed 1 (One) Time Per Week.  Dispense: 2 mL; Refill: 0    2. Change in nail appearance  -     Vitamin D,25-Hydroxy  -     Vitamin B12  -     TSH  -     T4, free    3. Vitamin D deficiency  -     Vitamin D,25-Hydroxy    4. Other fatigue  -     Vitamin B12  -     TSH  -     T4, free    5. Weight gain  -     Hemoglobin A1c  -     Vitamin D,25-Hydroxy  -     Vitamin B12    6. BMI 35.0-35.9,adult  -     Tirzepatide-Weight Management (ZEPBOUND) 2.5 MG/0.5ML solution auto-injector; Inject 0.5 mL under the skin into the appropriate area as directed 1 (One) Time Per Week.  Dispense: 2 mL; Refill: 0    7. Encounter for screening mammogram for malignant neoplasm of breast  -     Mammo screening digital tomosynthesis bilateral w CAD      Previously had success with Mounjaro before it became very difficult to obtain, but has not had this with Wegovy.  Try Zepbound.     Check labs for multiple concerns today    Mammogram ordered.          Follow Up     Return in about 4 months (around 6/22/2024) for Annual physical.  Patient was given instructions and counseling regarding her condition or for health maintenance advice. Please see specific information pulled into the AVS if appropriate.

## 2024-02-23 LAB
25(OH)D3+25(OH)D2 SERPL-MCNC: 40.8 NG/ML (ref 30–100)
HBA1C MFR BLD: 6.1 % (ref 4.8–5.6)
T4 FREE SERPL-MCNC: 0.88 NG/DL (ref 0.82–1.77)
TSH SERPL DL<=0.005 MIU/L-ACNC: 2.05 UIU/ML (ref 0.45–4.5)
VIT B12 SERPL-MCNC: 1081 PG/ML (ref 232–1245)

## 2024-03-22 ENCOUNTER — TELEPHONE (OUTPATIENT)
Dept: INTERNAL MEDICINE | Facility: CLINIC | Age: 64
End: 2024-03-22
Payer: COMMERCIAL

## 2024-03-22 DIAGNOSIS — E66.9 OBESITY WITH SERIOUS COMORBIDITY, UNSPECIFIED CLASSIFICATION, UNSPECIFIED OBESITY TYPE: ICD-10-CM

## 2024-03-22 DIAGNOSIS — E78.49 OTHER HYPERLIPIDEMIA: ICD-10-CM

## 2024-03-22 DIAGNOSIS — R73.03 PRE-DIABETES: Primary | ICD-10-CM

## 2024-03-22 NOTE — TELEPHONE ENCOUNTER
Caller: Mary Jo Brizuela    Relationship: Self    Best call back number:  [phone]  What is the best time to reach you: ANY     Who are you requesting to speak with (clinical staff, provider,  specific staff member): CLINICAL STAFF       What was the call regarding: PATIENT IS ASKING FOR A CALL BACK.  SHE IS CALLING IN REGARDING THE MEDICATION ZEPBOUND.  SHE IS UNABLE TO LOCATE THIS AT ANY PHARMACY WOULD LIKE TO DISCUSS OTHER OPTIONS

## 2024-03-25 NOTE — TELEPHONE ENCOUNTER
Would like a call, to see which one Dr. 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

## 2024-05-22 ENCOUNTER — TELEPHONE (OUTPATIENT)
Dept: INTERNAL MEDICINE | Facility: CLINIC | Age: 64
End: 2024-05-22
Payer: COMMERCIAL

## 2024-06-03 ENCOUNTER — TELEPHONE (OUTPATIENT)
Dept: INTERNAL MEDICINE | Facility: CLINIC | Age: 64
End: 2024-06-03
Payer: COMMERCIAL

## 2024-06-03 NOTE — TELEPHONE ENCOUNTER
She has cloudy urine and frequent urination and needed in today.  We do not have any openings so I advised going to Urgent Care. She will go to urgent care since we have nothing all week.

## 2024-06-05 DIAGNOSIS — F41.9 ANXIETY: Chronic | ICD-10-CM

## 2024-06-05 DIAGNOSIS — E78.49 OTHER HYPERLIPIDEMIA: ICD-10-CM

## 2024-06-05 DIAGNOSIS — R73.03 PRE-DIABETES: Primary | ICD-10-CM

## 2024-06-05 DIAGNOSIS — J45.30 MILD PERSISTENT ASTHMA WITHOUT COMPLICATION: Chronic | ICD-10-CM

## 2024-06-06 DIAGNOSIS — J45.30 MILD PERSISTENT ASTHMA WITHOUT COMPLICATION: Chronic | ICD-10-CM

## 2024-06-06 DIAGNOSIS — F41.9 ANXIETY: Chronic | ICD-10-CM

## 2024-06-06 DIAGNOSIS — E78.49 OTHER HYPERLIPIDEMIA: ICD-10-CM

## 2024-06-06 DIAGNOSIS — R73.03 PRE-DIABETES: ICD-10-CM

## 2024-06-07 LAB
ALBUMIN SERPL-MCNC: 5 G/DL (ref 3.5–5.2)
ALBUMIN/GLOB SERPL: 2.5 G/DL
ALP SERPL-CCNC: 76 U/L (ref 39–117)
ALT SERPL-CCNC: 16 U/L (ref 1–33)
AST SERPL-CCNC: 20 U/L (ref 1–32)
BASOPHILS # BLD AUTO: 0.07 10*3/MM3 (ref 0–0.2)
BASOPHILS NFR BLD AUTO: 1 % (ref 0–1.5)
BILIRUB SERPL-MCNC: 0.5 MG/DL (ref 0–1.2)
BUN SERPL-MCNC: 15 MG/DL (ref 8–23)
BUN/CREAT SERPL: 21.7 (ref 7–25)
CALCIUM SERPL-MCNC: 9.8 MG/DL (ref 8.6–10.5)
CHLORIDE SERPL-SCNC: 103 MMOL/L (ref 98–107)
CHOLEST SERPL-MCNC: 234 MG/DL (ref 0–200)
CO2 SERPL-SCNC: 27.7 MMOL/L (ref 22–29)
CREAT SERPL-MCNC: 0.69 MG/DL (ref 0.57–1)
EGFRCR SERPLBLD CKD-EPI 2021: 98.9 ML/MIN/1.73
EOSINOPHIL # BLD AUTO: 0.12 10*3/MM3 (ref 0–0.4)
EOSINOPHIL NFR BLD AUTO: 1.7 % (ref 0.3–6.2)
ERYTHROCYTE [DISTWIDTH] IN BLOOD BY AUTOMATED COUNT: 12.2 % (ref 12.3–15.4)
GLOBULIN SER CALC-MCNC: 2 GM/DL
GLUCOSE SERPL-MCNC: 96 MG/DL (ref 65–99)
HBA1C MFR BLD: 6.2 % (ref 4.8–5.6)
HCT VFR BLD AUTO: 43.8 % (ref 34–46.6)
HDLC SERPL-MCNC: 41 MG/DL (ref 40–60)
HGB BLD-MCNC: 14.9 G/DL (ref 12–15.9)
IMM GRANULOCYTES # BLD AUTO: 0.03 10*3/MM3 (ref 0–0.05)
IMM GRANULOCYTES NFR BLD AUTO: 0.4 % (ref 0–0.5)
LDLC SERPL CALC-MCNC: 179 MG/DL (ref 0–100)
LDLC/HDLC SERPL: 4.32 {RATIO}
LYMPHOCYTES # BLD AUTO: 1.69 10*3/MM3 (ref 0.7–3.1)
LYMPHOCYTES NFR BLD AUTO: 24.1 % (ref 19.6–45.3)
MCH RBC QN AUTO: 31.4 PG (ref 26.6–33)
MCHC RBC AUTO-ENTMCNC: 34 G/DL (ref 31.5–35.7)
MCV RBC AUTO: 92.2 FL (ref 79–97)
MONOCYTES # BLD AUTO: 0.48 10*3/MM3 (ref 0.1–0.9)
MONOCYTES NFR BLD AUTO: 6.8 % (ref 5–12)
NEUTROPHILS # BLD AUTO: 4.63 10*3/MM3 (ref 1.7–7)
NEUTROPHILS NFR BLD AUTO: 66 % (ref 42.7–76)
NRBC BLD AUTO-RTO: 0 /100 WBC (ref 0–0.2)
PLATELET # BLD AUTO: 234 10*3/MM3 (ref 140–450)
POTASSIUM SERPL-SCNC: 4.5 MMOL/L (ref 3.5–5.2)
PROT SERPL-MCNC: 7 G/DL (ref 6–8.5)
RBC # BLD AUTO: 4.75 10*6/MM3 (ref 3.77–5.28)
SODIUM SERPL-SCNC: 143 MMOL/L (ref 136–145)
T4 FREE SERPL-MCNC: 0.88 NG/DL (ref 0.92–1.68)
TRIGL SERPL-MCNC: 80 MG/DL (ref 0–150)
TSH SERPL DL<=0.005 MIU/L-ACNC: 2.67 UIU/ML (ref 0.27–4.2)
VLDLC SERPL CALC-MCNC: 14 MG/DL (ref 5–40)
WBC # BLD AUTO: 7.02 10*3/MM3 (ref 3.4–10.8)

## 2024-06-17 ENCOUNTER — OFFICE VISIT (OUTPATIENT)
Dept: INTERNAL MEDICINE | Facility: CLINIC | Age: 64
End: 2024-06-17
Payer: COMMERCIAL

## 2024-06-17 VITALS
TEMPERATURE: 97.5 F | BODY MASS INDEX: 35.61 KG/M2 | HEIGHT: 63 IN | HEART RATE: 93 BPM | DIASTOLIC BLOOD PRESSURE: 70 MMHG | OXYGEN SATURATION: 98 % | WEIGHT: 201 LBS | SYSTOLIC BLOOD PRESSURE: 106 MMHG

## 2024-06-17 DIAGNOSIS — R79.89 ABNORMAL SERUM THYROXINE (T4) LEVEL: ICD-10-CM

## 2024-06-17 DIAGNOSIS — Z00.00 ANNUAL PHYSICAL EXAM: Primary | ICD-10-CM

## 2024-06-17 DIAGNOSIS — E78.49 OTHER HYPERLIPIDEMIA: ICD-10-CM

## 2024-06-17 DIAGNOSIS — Z12.4 SCREENING FOR CERVICAL CANCER: ICD-10-CM

## 2024-06-17 DIAGNOSIS — L98.9 SKIN LESION: ICD-10-CM

## 2024-06-17 DIAGNOSIS — R73.03 PRE-DIABETES: ICD-10-CM

## 2024-06-17 PROCEDURE — 99396 PREV VISIT EST AGE 40-64: CPT | Performed by: INTERNAL MEDICINE

## 2024-06-17 RX ORDER — FLUCONAZOLE 150 MG/1
TABLET ORAL
COMMUNITY
Start: 2024-06-04

## 2024-06-19 LAB
CYTOLOGIST CVX/VAG CYTO: NORMAL
CYTOLOGY CVX/VAG DOC CYTO: NORMAL
CYTOLOGY CVX/VAG DOC THIN PREP: NORMAL
DX ICD CODE: NORMAL
HPV GENOTYPE REFLEX: NORMAL
HPV I/H RISK 4 DNA CVX QL PROBE+SIG AMP: NEGATIVE
Lab: NORMAL
OTHER STN SPEC: NORMAL
STAT OF ADQ CVX/VAG CYTO-IMP: NORMAL

## 2024-06-19 NOTE — PROGRESS NOTES
"Chief Complaint  Annual Exam (W/ pap)    Subjective        Mary Jo Brizuela presents to [facility]  History of Present Illness    Doing well over all. Just returned from vacation    Objective   Vital Signs:  /70 (BP Location: Left arm, Patient Position: Sitting, Cuff Size: Adult)   Pulse 93   Temp 97.5 °F (36.4 °C) (Infrared)   Ht 160 cm (62.99\")   Wt 91.2 kg (201 lb)   SpO2 98%   BMI 35.61 kg/m²   Estimated body mass index is 35.61 kg/m² as calculated from the following:    Height as of this encounter: 160 cm (62.99\").    Weight as of this encounter: 91.2 kg (201 lb).               Physical Exam  Vitals reviewed.   Constitutional:       General: She is not in acute distress.     Appearance: Normal appearance.   HENT:      Head: Normocephalic and atraumatic.      Nose: Nose normal.      Mouth/Throat:      Mouth: Mucous membranes are moist.   Eyes:      Conjunctiva/sclera: Conjunctivae normal.   Pulmonary:      Effort: Pulmonary effort is normal.   Genitourinary:     General: Normal vulva.      Vagina: No vaginal discharge.      Comments: Vaginal walls do fall in during speculum exam  Skin:     General: Skin is warm and dry.   Neurological:      General: No focal deficit present.      Mental Status: She is alert.   Psychiatric:         Mood and Affect: Mood normal.      Result Review :    The following data was reviewed by: Winter Danielle MD on 06/17/2024:  Common labs          2/22/2024    14:03 6/6/2024    11:27   Common Labs   Glucose  96    BUN  15    Creatinine  0.69    Sodium  143    Potassium  4.5    Chloride  103    Calcium  9.8    Total Protein  7.0    Albumin  5.0    Total Bilirubin  0.5    Alkaline Phosphatase  76    AST (SGOT)  20    ALT (SGPT)  16    WBC  7.02    Hemoglobin  14.9    Hematocrit  43.8    Platelets  234    Total Cholesterol  234    Triglycerides  80    HDL Cholesterol  41    LDL Cholesterol   179    Hemoglobin A1C 6.1  6.20      Data reviewed : labs " ordered by myself             Assessment and Plan     Diagnoses and all orders for this visit:    1. Annual physical exam (Primary)    2. Skin lesion  -     Ambulatory Referral to Dermatology    3. Screening for cervical cancer  -     IGP, Aptima HPV, Rfx 16 / 18,45    4. Pre-diabetes  -     Lipid Panel With LDL / HDL Ratio; Future  -     Hemoglobin A1c; Future    5. Other hyperlipidemia  -     Lipid Panel With LDL / HDL Ratio; Future  -     Hemoglobin A1c; Future    6. Abnormal serum thyroxine (T4) level  -     TSH; Future             Follow Up     Return in about 6 months (around 12/17/2024). With repeat labs as ordered above.    Patient was given instructions and counseling regarding her condition or for health maintenance advice. Please see specific information pulled into the AVS if appropriate.

## 2024-06-28 ENCOUNTER — HOSPITAL ENCOUNTER (OUTPATIENT)
Dept: MAMMOGRAPHY | Facility: HOSPITAL | Age: 64
Discharge: HOME | End: 2024-06-28
Admitting: INTERNAL MEDICINE
Payer: COMMERCIAL

## 2024-06-28 PROCEDURE — 77063 BREAST TOMOSYNTHESIS BI: CPT

## 2024-06-28 PROCEDURE — 77067 SCR MAMMO BI INCL CAD: CPT

## 2024-07-26 DIAGNOSIS — F41.9 ANXIETY: Chronic | ICD-10-CM

## 2024-07-26 DIAGNOSIS — J30.9 ALLERGIC RHINITIS, UNSPECIFIED SEASONALITY, UNSPECIFIED TRIGGER: ICD-10-CM

## 2024-07-26 DIAGNOSIS — J45.30 MILD PERSISTENT ASTHMA WITHOUT COMPLICATION: Chronic | ICD-10-CM

## 2024-07-26 RX ORDER — AZELASTINE 1 MG/ML
1 SPRAY, METERED NASAL DAILY
Qty: 30 ML | Refills: 5 | Status: SHIPPED | OUTPATIENT
Start: 2024-07-26

## 2024-07-26 RX ORDER — ESCITALOPRAM OXALATE 20 MG/1
20 TABLET ORAL DAILY
Qty: 90 TABLET | Refills: 1 | Status: SHIPPED | OUTPATIENT
Start: 2024-07-26

## 2024-07-26 RX ORDER — FLUTICASONE PROPIONATE 50 MCG
2 SPRAY, SUSPENSION (ML) NASAL DAILY
Qty: 16 ML | Refills: 6 | Status: SHIPPED | OUTPATIENT
Start: 2024-07-26

## 2024-07-26 RX ORDER — LORATADINE 10 MG/1
10 CAPSULE, LIQUID FILLED ORAL DAILY
Qty: 90 EACH | Refills: 3 | Status: SHIPPED | OUTPATIENT
Start: 2024-07-26

## 2024-07-26 NOTE — TELEPHONE ENCOUNTER
Caller: Mary Jo Brizuela    Relationship: Self    Best call back number: [phone]     Requested Prescriptions:   Requested Prescriptions     Pending Prescriptions Disp Refills    azelastine (ASTELIN) 0.1 % nasal spray 30 mL 5     Si spray into the nostril(s) as directed by provider Daily. Use in each nostril as directed    fluticasone (FLONASE) 50 MCG/ACT nasal spray 16 mL 6    Loratadine 10 MG capsule 90 each 3     Sig: Take 1 capsule by mouth Daily.    escitalopram (LEXAPRO) 20 MG tablet 90 tablet 2     Sig: Take 1 tablet by mouth Daily.        Pharmacy where request should be sent: [facility] [id number] [place], [place] [address] [facility] - [phone]  - [phone]     Last office visit with prescribing clinician: 2024   Last telemedicine visit with prescribing clinician: Visit date not found   Next office visit with prescribing clinician: 2024     Additional details provided by patient: PATIENT IS NEEDING A YEAR LONG REFILL FOR PRESCRIPTIONS. PATIENT IS OUT OF AZELASTINE,FLUTICASONE,LORATADINE. PLEASE REFILL ASAP    Does the patient have less than a 3 day supply:  [x] Yes  [] No    Would you like a call back once the refill request has been completed: [] Yes [x] No    If the office needs to give you a call back, can they leave a voicemail: [x] Yes [] No    Annabelle Brady   24 13:37 EDT

## 2024-08-13 ENCOUNTER — OFFICE VISIT (OUTPATIENT)
Dept: INTERNAL MEDICINE | Facility: CLINIC | Age: 64
End: 2024-08-13
Payer: COMMERCIAL

## 2024-08-13 VITALS
WEIGHT: 198.8 LBS | TEMPERATURE: 98 F | BODY MASS INDEX: 35.22 KG/M2 | DIASTOLIC BLOOD PRESSURE: 62 MMHG | HEIGHT: 63 IN | HEART RATE: 85 BPM | OXYGEN SATURATION: 97 % | SYSTOLIC BLOOD PRESSURE: 114 MMHG

## 2024-08-13 DIAGNOSIS — H10.9 CONJUNCTIVITIS OF RIGHT EYE, UNSPECIFIED CONJUNCTIVITIS TYPE: ICD-10-CM

## 2024-08-13 DIAGNOSIS — U07.1 COVID-19: Primary | ICD-10-CM

## 2024-08-13 PROCEDURE — 99213 OFFICE O/P EST LOW 20 MIN: CPT | Performed by: NURSE PRACTITIONER

## 2024-08-13 RX ORDER — ERYTHROMYCIN 5 MG/G
OINTMENT OPHTHALMIC EVERY 6 HOURS
Qty: 3.5 G | Refills: 0 | Status: SHIPPED | OUTPATIENT
Start: 2024-08-13 | End: 2024-08-20

## 2024-08-13 NOTE — PROGRESS NOTES
"Mary Jo Brizuela is a 64 y.o. female presenting today for   Chief Complaint   Patient presents with    covid-19     COVID POSITIVE 8/7, NO FEVER SINCE 08/09/24, STARTED BACK TO WORK 08/12/24. WOKE UP THIS MORNING WITH MATTED EYE, GRITTY FEEL IN RIGHT EYE, WHILE BRUSHING TEETH, MOUTH STARTED BLEEDING, TONGUE BLEEDING AS WELL.     Pt presents for an acute visit; her PCP is Dr. Danielle.    Subjective    History of Present Illness       Symptom onset 10 days ago.  Positive COVID test 6 days ago.  Symptoms include fatigue, HA, ST, drainage, cough  Progression: improving.    Yesterday she began to experience redness and drainage from the R eye. She began applying an abx eye drop she had at home. This seemed to make S&S worse. She sees [facility] for her vision exams and contacts.    This am while brushing her teeth and tongue she experienced bleeding. This lasted for about 2min and then stopped. This has not recurred.      The following portions of the patient's history were reviewed and updated as appropriate: allergies, current medications, problem list, past medical history, past surgical history, family history, and social history.    Review of Systems   Respiratory:  Negative for shortness of breath.    Cardiovascular:  Negative for chest pain.         Objective    Vitals:    08/13/24 1247   BP: 114/62   BP Location: Left arm   Patient Position: Sitting   Cuff Size: Adult   Pulse: 85   Temp: 98 °F (36.7 °C)   TempSrc: Infrared   SpO2: 97%   Weight: 90.2 kg (198 lb 12.8 oz)   Height: 160 cm (62.99\")     Body mass index is 35.23 kg/m².  Nursing notes and vitals reviewed.    Physical Exam  Constitutional:       General: She is not in acute distress.     Appearance: She is well-developed.   HENT:      Head: Normocephalic.      Right Ear: Hearing, tympanic membrane, ear canal and external ear normal.      Left Ear: Hearing, tympanic membrane, ear canal and external ear normal.      Nose: Nose normal.      Mouth/Throat:     "  Mouth: Mucous membranes are moist.      Pharynx: Oropharynx is clear. Uvula midline.   Eyes:      General: Lids are normal.      Extraocular Movements: Extraocular movements intact.      Right eye: Normal extraocular motion.      Left eye: Normal extraocular motion.      Conjunctiva/sclera:      Right eye: Right conjunctiva is injected.      Left eye: Left conjunctiva is not injected.   Neck:      Thyroid: No thyroid mass or thyromegaly.   Cardiovascular:      Rate and Rhythm: Regular rhythm.      Pulses: Normal pulses.      Heart sounds: S1 normal and S2 normal. No murmur heard.     No friction rub. No gallop.   Pulmonary:      Effort: Pulmonary effort is normal.      Breath sounds: Normal breath sounds. No wheezing, rhonchi or rales.   Musculoskeletal:      Cervical back: Neck supple.   Lymphadenopathy:      Cervical: No cervical adenopathy.   Neurological:      Mental Status: She is alert and oriented to person, place, and time.      Cranial Nerves: No cranial nerve deficit.      Sensory: No sensory deficit.   Psychiatric:         Attention and Perception: She is attentive.         Speech: Speech normal.         Behavior: Behavior normal.           Assessment and Plan    Diagnoses and all orders for this visit:    1. COVID-19 (Primary)    2. Conjunctivitis of right eye, unspecified conjunctivitis type  -     erythromycin (ROMYCIN) 5 MG/GM ophthalmic ointment; Apply  to eye(s) as directed by provider Every 6 (Six) Hours for 7 days.  Dispense: 3.5 g; Refill: 0        No options/tools to evaluate the eye for abrasion or FB.  [facility]    Anticipatory guidance. Discussed supportive care and emergent S&S.  - Mucinex      Medications, including side effects, were discussed with the patient. Patient verbalized understanding.  The plan of care was discussed. All questions were answered. Patient verbalized understanding.        Return if symptoms worsen or fail to improve.

## 2024-11-20 ENCOUNTER — TELEMEDICINE (OUTPATIENT)
Dept: FAMILY MEDICINE CLINIC | Facility: TELEHEALTH | Age: 64
End: 2024-11-20
Payer: COMMERCIAL

## 2024-11-20 DIAGNOSIS — J01.40 ACUTE NON-RECURRENT PANSINUSITIS: Primary | ICD-10-CM

## 2024-11-20 PROCEDURE — 99213 OFFICE O/P EST LOW 20 MIN: CPT | Performed by: NURSE PRACTITIONER

## 2024-11-20 RX ORDER — METHYLPREDNISOLONE 4 MG/1
TABLET ORAL
Qty: 21 TABLET | Refills: 0 | Status: SHIPPED | OUTPATIENT
Start: 2024-11-20

## 2024-11-20 RX ORDER — FLUCONAZOLE 150 MG/1
150 TABLET ORAL
Qty: 2 TABLET | Refills: 0 | Status: SHIPPED | OUTPATIENT
Start: 2024-11-20 | End: 2024-11-24

## 2024-11-20 NOTE — PROGRESS NOTES
CHIEF COMPLAINT  Chief Complaint   Patient presents with    Sinusitis         HPI  Mary Jo Brizuela is a 64 y.o. female  presents with complaint of allergy symptoms for about a week with worsening symptoms over the weekend. Now the pressure and drainage have increased. She feels she was cleaning out a dirty shed/garage and this worsened symptoms.     Review of Systems   Constitutional:  Positive for fatigue. Negative for chills, diaphoresis and fever.   HENT:  Positive for congestion, postnasal drip, sinus pressure, sinus pain and sore throat. Negative for rhinorrhea and sneezing.    Respiratory:  Positive for cough. Negative for chest tightness, shortness of breath and wheezing.    Neurological:  Positive for headaches.       Past Medical History:   Diagnosis Date    Acute sinusitis, unspecified     Allergic 25 years ago    Environmental and weather changes.  Daily use of saline, and two nasal sprays    Anemia     Anxiety 01/20/2022    Anxiety and depression     Anxiety disorder     Asymptomatic varicose veins of bilateral lower extremities     Cataract     Small cataracts that are checked yearly    Corns and callosities     Deep vein thrombosis Since early twenties    Old blood clot from injury adhered to inner wall of vein in left leg    Diabetes mellitus     Was pre diabetic late 2022 to fall 2023    Frequency of micturition     Grief reaction     NORMAL    Headache 25 years ago    Get migraines periodically with extreme vertigo, nausea and tiredness. Every large change in barometric pressure ensures me a migraine    History of mammogram 06/2020    normal, never abn,    HL (hearing loss)     Hyperlipidemia     Left fibular fracture 04/2017    Major depressive disorder, single episode, unspecified     Menopausal and female climacteric states     Mild persistent asthma without complication 01/20/2022    Non-scarring hair loss     Nonscarring hair loss, unspecified     Obesity     Osteopenia June 2023    Beginning of  Osteoporosis.  On medical records    Other acute sinusitis     Other amnesia     Other chest pain     Other fatigue     Pain in unspecified shoulder     Panic disorder without agoraphobia     Papanicolaou smear 06/2019    neg with neg HPV, repeat 5 years, due 2024    Paresthesia of skin     Radiculopathy, cervical region     Recurrent sinusitis     Seasonal allergies     Unspecified asthma, uncomplicated     Unspecified disorder of synovium and tendon, left shoulder     Viral infection, unspecified     Vitamin D deficiency 06/13/2023       Family History   Problem Relation Age of Onset    Heart disease Mother     Asthma Mother         Allergy asthma    Vision loss Mother         Cataract surgery    Cancer Father         Blood & Bone    Vision loss Father         Eye surgery    Colon cancer Sister     Cancer Sister         Colon? Liver?    Colon polyps Brother     Anxiety disorder Brother         All five us siblings.   and both my children    Depression Brother         Me and three brothers.  Also, my late son    Asthma Brother     Cancer Brother         Leukemia    Asthma Son     Early death Son         Took his life    Diabetes Maternal Grandmother     Vision loss Maternal Grandmother         Cataract surgery    Diabetes Paternal Grandmother     Breast cancer Neg Hx        Social History     Socioeconomic History    Marital status:     Number of children: 2   Tobacco Use    Smoking status: Never     Passive exposure: Never    Smokeless tobacco: Never   Vaping Use    Vaping status: Never Used   Substance and Sexual Activity    Alcohol use: Never    Drug use: Never    Sexual activity: Not Currently     Partners: Male     Birth control/protection: Abstinence         There were no vitals taken for this visit.    PHYSICAL EXAM  Physical Exam   Constitutional: She is oriented to person, place, and time. She appears well-developed and well-nourished. She does not have a sickly appearance. She does not appear ill.  No distress.   HENT:   Head: Normocephalic and atraumatic.   Nose: Congestion present. Right sinus exhibits maxillary sinus tenderness and frontal sinus tenderness. Left sinus exhibits maxillary sinus tenderness and frontal sinus tenderness.   Eyes: EOM are normal.   Neck: Neck normal appearance.  Pulmonary/Chest: Effort normal.  No respiratory distress.  Neurological: She is alert and oriented to person, place, and time.   Skin: Skin is dry.   Psychiatric: She has a normal mood and affect.           Diagnoses and all orders for this visit:    1. Acute non-recurrent pansinusitis (Primary)    Other orders  -     amoxicillin-clavulanate (AUGMENTIN) 875-125 MG per tablet; Take 1 tablet by mouth 2 (Two) Times a Day for 7 days.  Dispense: 14 tablet; Refill: 0  -     methylPREDNISolone (MEDROL) 4 MG dose pack; Take as directed on package instructions.  Dispense: 21 tablet; Refill: 0  -     fluconazole (Diflucan) 150 MG tablet; Take 1 tablet by mouth Every 3 (Three) Days for 2 doses.  Dispense: 2 tablet; Refill: 0        Mode of visit: Video   Myself and Mary Jo Brizuela participated in this visit. The patient is located in [address] [place] [address]. I am located in [place], [place]. Mychart and Twilio were utilized.   You have chosen to receive care through a telehealth visit.    You have chosen to receive care through a telehealth visit.   Does the patient consent to use a video/audio connection for your medical care today? Yes       Note Disclaimer: At [facility], we believe that sharing information builds trust and better   relationships. You are receiving this note because you recently visited [facility]. It is possible you   will see health information before a provider has talked with you about it. This kind of information can   be easy to misunderstand. To help you fully understand what it means for your health, we urge you to   discuss this note with your provider.    Cathy Mcbride,  PHILIP  11/20/2024  12:30 EST

## 2024-12-04 DIAGNOSIS — R73.03 PRE-DIABETES: ICD-10-CM

## 2024-12-04 DIAGNOSIS — R79.89 ABNORMAL SERUM THYROXINE (T4) LEVEL: ICD-10-CM

## 2024-12-04 DIAGNOSIS — E78.49 OTHER HYPERLIPIDEMIA: ICD-10-CM

## 2024-12-10 LAB
CHOLEST SERPL-MCNC: 234 MG/DL (ref 0–200)
HBA1C MFR BLD: 6 % (ref 4.8–5.6)
HDLC SERPL-MCNC: 43 MG/DL (ref 40–60)
LDLC SERPL CALC-MCNC: 172 MG/DL (ref 0–100)
LDLC/HDLC SERPL: 3.94 {RATIO}
TRIGL SERPL-MCNC: 108 MG/DL (ref 0–150)
TSH SERPL DL<=0.005 MIU/L-ACNC: 2.42 UIU/ML (ref 0.27–4.2)
VLDLC SERPL CALC-MCNC: 19 MG/DL (ref 5–40)

## 2024-12-11 ENCOUNTER — E-VISIT (OUTPATIENT)
Dept: FAMILY MEDICINE CLINIC | Facility: TELEHEALTH | Age: 64
End: 2024-12-11
Payer: COMMERCIAL

## 2024-12-11 PROCEDURE — FABRICHEALTHVISIT: Performed by: NURSE PRACTITIONER

## 2024-12-11 RX ORDER — METHYLPREDNISOLONE 4 MG/1
TABLET ORAL
Qty: 21 TABLET | Refills: 0 | Status: SHIPPED | OUTPATIENT
Start: 2024-12-11

## 2024-12-11 RX ORDER — FLUCONAZOLE 150 MG/1
150 TABLET ORAL
Qty: 2 TABLET | Refills: 0 | Status: SHIPPED | OUTPATIENT
Start: 2024-12-11 | End: 2024-12-15

## 2024-12-11 NOTE — E-VISIT TREATED
Date: 2024 12:45:56  Clinician: Cathy Mcbride  Clinician NPI: [id number]  Patient: Mary Jo Brizuela  Patient : 1960  [facility] [place] [address]  Patient Phone: [phone]  Visit Protocol: URI  Patient Summary:  Mary Jo is a 64 year old ( : 1960 ) female who initiated a visit for cold, sinus infection, or influenza.     Mary Jo states the symptoms started gradually 7-9 days ago.   Symptom start date: Week ago    The symptoms   consist of anosmia or ageusia, a headache, myalgia, nasal congestion, a cough, facial pain or pressure, a sore throat, malaise, and rhinitis. Mary Jo is experiencing mild difficulty breathing but it does not interfere with daily activities.   Symptom   details     Nasal secretions: The color of the mucus is green.    Cough: Mary Jo coughs every 5-10 minutes and the cough is more bothersome at night. Phlegm comes into the throat when coughing. Mary Jo believes the cough is caused by post-nasal drip. The color of the phlegm is yellow and green.     Sore throat: Mary Jo reports having moderate throat pain (4-6 on a 10 point pain scale), does not have exudate on the tonsils, and can swallow liquids without any difficulty. Mary Jo is not sure if the lymph nodes in the neck are enlarged. A rash has   not appeared on the skin since the sore throat started.     Facial pain or pressure: The facial pain or pressure feels worse when bending over or leaning forward.     Headache: The headache is moderate (4-6 on a 10 point pain scale).      Mary Jo denies having diarrhea, nausea, teeth pain, wheezing, chills, fever, ear pain, and vomiting. Mary Jo also denies double sickening (worsening symptoms after initial improvement) and having recent facial or sinus surgery in the past 60 days.     Precipitating events  Within the past week, Mary Jo has not been exposed to someone with strep throat. Mary Jo has not recently been exposed to someone  with influenza. Mary Jo has been in close contact with the following high risk individuals: people   with asthma, heart disease or diabetes and adults 65 or older.   Mary Jo has not received the RSV vaccine.   Mary Jo has not received the influenza vaccination.   Pertinent COVID-19 (Coronavirus) information  Since the symptoms started, Mary Jo has not   tested for COVID-19.   Mary Jo has not had COVID-19 in the last 3 months.   Mary Jo has not received a COVID-19 vaccine in the past year.   Pertinent medical history     Mary Jo had 3 sinus infections within the past year.   Mary Jo has taken an   antibiotic medication for similar symptoms in the past month. Antibiotic name as reported by the patient (free text): Can't remember. My head is too fuzzy to think   Mary Jo typically gets a yeast infection when an antibiotic is taken. Mary Jo has   successfully used Diflucan to treat previous yeast infections. 2 doses of fluconazole (Diflucan) has typically been needed for symptoms to resolve in the past.  A provider has not told Mary Jo to avoid NSAIDs.   Mary Jo does not have diabetes. Mary Jo   denies having immunosuppressive conditions (e.g., chemotherapy, HIV, organ transplant, long-term use of steroids or other immunosuppressive medications, splenectomy). Mary Jo denies having heart disease, severe COPD, and congestive heart failure. Mary Jo   does not have asthma.   Mary Jo denies having chronic lung disease, cystic fibrosis, hypertension, long-term disabilities, mental health conditions, sickle cell disease or thalassemia, stroke or other cardiovascular disease, substance use disorders, or   tuberculosis (TB).  Mary Jo does not smoke or use smokeless tobacco. Mary Jo does not vape or use other e-cigarette products.   Weight: 198 lbs (89.81 kg)    MEDICATIONS: Arnuity Ellipta inhalation, potassium chloride oral, aspirin oral, albuterol sulfate inhalation, fluticasone propionate nasal, montelukast oral, aspirin oral,  azelastine nasal, loratadine oral, ALLERGIES: NKDA  Clinician Response:  Dear Mary Jo,  Based on the information provided, you have acute bacterial sinusitis, also known as a sinus infection. Sinus infections are caused by bacteria or a virus and symptoms are almost always identical. The difference   between the 2 types of infections is timing.  Sinus infections start as viral infections and symptoms improve on their own in about 7 days. A bacterial infection may have developed if any of the following apply to you:     You have had 7 days of symptoms and are experiencing at least 2 of the following:       Fever    Facial pressure or headache    Green or yellow nasal mucus    Symptoms that improved and then got worse again       You have had symptoms for 10 or more days     Medication information  I am prescribing:       Mometasone (Nasonex) 50 mcg/actuation nasal spray. Inhale 2 sprays in each nostril 1 time per day. There are no refills with this prescription.      Amoxicillin-pot clavulanate 875-125 mg oral tablet. Take 1 tablet by mouth every 12 hours for 7 days. There are no refills with this prescription.     Yeast infections can be a common side effect of antibiotics. The most common symptom of a yeast infection is itchiness in and around the vagina. Other signs and symptoms include burning, redness of the vulva (the outer part of the female genitals), or   a thick, white vaginal discharge that looks like cottage cheese and does not have a bad smell.  Tips for using a nasal spray:     When the medication is being sprayed in your nose, point the tip of the nasal spray towards your ear.    Do not blow your nose after using the spray.    Do not lean your head back after using the spray as it will go down your throat.    Wipe the tip of the nose piece after use with a dry, clean tissue.     Self care  Steps you can take to be as comfortable as possible:     Rest.    Drink plenty of fluids.    Take a warm shower to  loosen congestion.    Use a cool-mist humidifier.    Use throat lozenges.    Suck on frozen items such as popsicles.    Drink hot tea with lemon and honey.    Gargle with warm salt water (1/4 teaspoon of salt per 8 ounce glass of water).    Take a spoonful of honey to reduce your cough.     When to seek care  Please be seen in a clinic or urgent care if any of the following occur:     New symptoms develop, or symptoms become worse    Symptoms do not start to improve after 3 days of treatment     Call 911 or go to the emergency room if any of the following occur:     Difficulty breathing    If you feel that your throat is closing off    Suddenly develop a rash    Unable to swallow fluids or are drooling     It is possible to have an allergic reaction to an antibiotic even if you have not had one in the past. If you notice a new rash, significant swelling, or difficulty breathing, stop taking this medication immediately and go to a clinic or urgent care.    For the latest updates on COVID-19 (Coronavirus), please visit the Centers for Disease Control and Prevention (CDC). Also, your state and local health department websites may provide additional guidance regarding testing and isolation recommendations for   your location.   Diagnosis: Acute bacterial sinusitis  Diagnosis ICD: J01.90    Follow up instructions: ATTENTION: If you have been prescribed medications, your prescriptions will not be sent until you choose your pharmacy.  To do so open the link within your notification, or go to [employer] and click eVisit in the menu to open your   treatment plan. From there, you can select your pharmacy at the bottom of your after visit summary. You can also go to [email or web address]  Prescriptions  Prescription: mometasone (Nasonex) 50 mcg/actuation nasal spray,non-aerosol, inhale 2 sprays in each nostril 1 time per day  Sent To: [facility] [id number] - [id number] - [address]  [place] [address]  Prescription: amoxicillin-pot clavulanate 875-125 mg oral tablet, take 1 tablet by mouth every 12 hours for 7 days  Sent To: [facility] [id number] - [phone] - [address],  [place] [address]

## 2024-12-12 RX ORDER — DOXYCYCLINE 100 MG/1
100 TABLET ORAL 2 TIMES DAILY
Qty: 20 TABLET | Refills: 0 | Status: SHIPPED | OUTPATIENT
Start: 2024-12-12 | End: 2024-12-22

## 2024-12-17 ENCOUNTER — OFFICE VISIT (OUTPATIENT)
Dept: INTERNAL MEDICINE | Facility: CLINIC | Age: 64
End: 2024-12-17
Payer: COMMERCIAL

## 2024-12-17 VITALS
BODY MASS INDEX: 29.41 KG/M2 | HEIGHT: 63 IN | OXYGEN SATURATION: 98 % | DIASTOLIC BLOOD PRESSURE: 70 MMHG | HEART RATE: 96 BPM | WEIGHT: 166 LBS | SYSTOLIC BLOOD PRESSURE: 122 MMHG | TEMPERATURE: 97.7 F

## 2024-12-17 DIAGNOSIS — E66.3 OVERWEIGHT (BMI 25.0-29.9): ICD-10-CM

## 2024-12-17 DIAGNOSIS — J45.30 MILD PERSISTENT ASTHMA WITHOUT COMPLICATION: Chronic | ICD-10-CM

## 2024-12-17 DIAGNOSIS — F41.9 ANXIETY: Chronic | ICD-10-CM

## 2024-12-17 DIAGNOSIS — J30.9 ALLERGIC RHINITIS, UNSPECIFIED SEASONALITY, UNSPECIFIED TRIGGER: ICD-10-CM

## 2024-12-17 DIAGNOSIS — Z12.11 SCREENING FOR COLON CANCER: ICD-10-CM

## 2024-12-17 DIAGNOSIS — R73.03 PREDIABETES: ICD-10-CM

## 2024-12-17 DIAGNOSIS — J45.41 MODERATE PERSISTENT ASTHMA WITH EXACERBATION: Primary | ICD-10-CM

## 2024-12-17 DIAGNOSIS — E78.00 PURE HYPERCHOLESTEROLEMIA: ICD-10-CM

## 2024-12-17 RX ORDER — ESCITALOPRAM OXALATE 20 MG/1
20 TABLET ORAL DAILY
Qty: 90 TABLET | Refills: 3 | Status: SHIPPED | OUTPATIENT
Start: 2024-12-17

## 2024-12-17 RX ORDER — LORATADINE 10 MG/1
10 CAPSULE, LIQUID FILLED ORAL DAILY
Qty: 90 EACH | Refills: 3 | Status: SHIPPED | OUTPATIENT
Start: 2024-12-17

## 2024-12-17 RX ORDER — BUDESONIDE AND FORMOTEROL FUMARATE DIHYDRATE 160; 4.5 UG/1; UG/1
2 AEROSOL RESPIRATORY (INHALATION)
Qty: 10.2 G | Refills: 12 | Status: SHIPPED | OUTPATIENT
Start: 2024-12-17

## 2024-12-17 RX ORDER — FLUTICASONE PROPIONATE 50 MCG
2 SPRAY, SUSPENSION (ML) NASAL DAILY
Qty: 16 G | Refills: 6 | Status: SHIPPED | OUTPATIENT
Start: 2024-12-17

## 2024-12-17 RX ORDER — ALBUTEROL SULFATE 90 UG/1
2 INHALANT RESPIRATORY (INHALATION) EVERY 4 HOURS PRN
Qty: 18 G | Refills: 6 | Status: SHIPPED | OUTPATIENT
Start: 2024-12-17

## 2024-12-17 RX ORDER — MONTELUKAST SODIUM 10 MG/1
10 TABLET ORAL NIGHTLY
Qty: 90 TABLET | Refills: 3 | Status: SHIPPED | OUTPATIENT
Start: 2024-12-17

## 2024-12-17 NOTE — PROGRESS NOTES
Mary Jo Brizuela is a 64 y.o. female who presents with a chief complaint of   Chief Complaint   Patient presents with    Hyperlipidemia     6 month follow up    Cough     Off and on, ongoing since November. Finishing steroid today and on antibiotic        HPI     Got Augmentin for infection.  Finishing steroid and then also took a Z-pack.       The following portions of the patient's history were reviewed and updated as appropriate: allergies, current medications, past family history, past medical history, past social history, past surgical history and problem list.      Current Outpatient Medications:     albuterol sulfate  (90 Base) MCG/ACT inhaler, Inhale 2 puffs Every 4 (Four) Hours As Needed for Wheezing or Shortness of Air., Disp: 18 g, Rfl: 6    Aspirin 81 MG capsule, Take 1 capsule by mouth Daily., Disp: 90 capsule, Rfl: 3    azelastine (ASTELIN) 0.1 % nasal spray, 1 spray into the nostril(s) as directed by provider Daily. Use in each nostril as directed, Disp: 30 mL, Rfl: 5    doxycycline (ADOXA) 100 MG tablet, Take 1 tablet by mouth 2 (Two) Times a Day for 10 days., Disp: 20 tablet, Rfl: 0    escitalopram (LEXAPRO) 20 MG tablet, Take 1 tablet by mouth Daily., Disp: 90 tablet, Rfl: 3    fluticasone (FLONASE) 50 MCG/ACT nasal spray, Administer 2 sprays into the nostril(s) as directed by provider Daily., Disp: 16 g, Rfl: 6    Loratadine 10 MG capsule, Take 1 capsule by mouth Daily., Disp: 90 each, Rfl: 3    methylPREDNISolone (MEDROL) 4 MG dose pack, Take as directed on package instructions., Disp: 21 tablet, Rfl: 0    montelukast (SINGULAIR) 10 MG tablet, Take 1 tablet by mouth Every Night., Disp: 90 tablet, Rfl: 3    VITAMIN D, ERGOCALCIFEROL, PO, Take 2,000 Units by mouth Every Night., Disp: , Rfl:     budesonide-formoterol (Symbicort) 160-4.5 MCG/ACT inhaler, Inhale 2 puffs 2 (Two) Times a Day., Disp: 10.2 g, Rfl: 12    Tirzepatide-Weight Management (ZEPBOUND) 2.5 MG/0.5ML solution  "auto-injector, Inject 0.5 mL under the skin into the appropriate area as directed 1 (One) Time Per Week., Disp: 2 mL, Rfl: 0            Physical Exam  /70 (BP Location: Left arm, Patient Position: Sitting, Cuff Size: Adult)   Pulse 96   Temp 97.7 °F (36.5 °C) (Infrared)   Ht 160 cm (63\")   Wt 75.3 kg (166 lb)   SpO2 98%   BMI 29.41 kg/m²     Physical Exam  Vitals reviewed.   Constitutional:       General: She is not in acute distress.     Appearance: Normal appearance.   HENT:      Head: Normocephalic and atraumatic.      Nose: Nose normal.      Mouth/Throat:      Mouth: Mucous membranes are moist.   Eyes:      Conjunctiva/sclera: Conjunctivae normal.   Pulmonary:      Effort: Pulmonary effort is normal.   Skin:     General: Skin is warm and dry.   Neurological:      General: No focal deficit present.      Mental Status: She is alert.   Psychiatric:         Mood and Affect: Mood normal.           Results for orders placed or performed in visit on 12/04/24   TSH    Collection Time: 12/10/24 11:17 AM    Specimen: Blood   Result Value Ref Range    TSH 2.420 0.270 - 4.200 uIU/mL   Hemoglobin A1c    Collection Time: 12/10/24 11:17 AM    Specimen: Blood   Result Value Ref Range    Hemoglobin A1C 6.00 (H) 4.80 - 5.60 %   Lipid Panel With LDL / HDL Ratio    Collection Time: 12/10/24 11:17 AM    Specimen: Blood   Result Value Ref Range    Total Cholesterol 234 (H) 0 - 200 mg/dL    Triglycerides 108 0 - 150 mg/dL    HDL Cholesterol 43 40 - 60 mg/dL    VLDL Cholesterol Jese 19 5 - 40 mg/dL    LDL Chol Calc (NIH) 172 (H) 0 - 100 mg/dL    LDL/HDL RATIO 3.94            Diagnoses and all orders for this visit:    1. Moderate persistent asthma with exacerbation (Primary)  -     budesonide-formoterol (Symbicort) 160-4.5 MCG/ACT inhaler; Inhale 2 puffs 2 (Two) Times a Day.  Dispense: 10.2 g; Refill: 12  -     Comprehensive Metabolic Panel; Future  -     CBC & Differential; Future  -     albuterol sulfate  (90 Base) " MCG/ACT inhaler; Inhale 2 puffs Every 4 (Four) Hours As Needed for Wheezing or Shortness of Air.  Dispense: 18 g; Refill: 6    2. Screening for colon cancer  -     Cologuard - Stool, Per Rectum; Future    3. Prediabetes  -     Hemoglobin A1c; Future  -     Tirzepatide-Weight Management (ZEPBOUND) 2.5 MG/0.5ML solution auto-injector; Inject 0.5 mL under the skin into the appropriate area as directed 1 (One) Time Per Week.  Dispense: 2 mL; Refill: 0  -     Aspirin 81 MG capsule; Take 1 capsule by mouth Daily.  Dispense: 90 capsule; Refill: 3    4. Pure hypercholesterolemia  -     Lipid Panel With LDL / HDL Ratio; Future  -     Tirzepatide-Weight Management (ZEPBOUND) 2.5 MG/0.5ML solution auto-injector; Inject 0.5 mL under the skin into the appropriate area as directed 1 (One) Time Per Week.  Dispense: 2 mL; Refill: 0  -     Aspirin 81 MG capsule; Take 1 capsule by mouth Daily.  Dispense: 90 capsule; Refill: 3    5. Overweight (BMI 25.0-29.9)  -     Tirzepatide-Weight Management (ZEPBOUND) 2.5 MG/0.5ML solution auto-injector; Inject 0.5 mL under the skin into the appropriate area as directed 1 (One) Time Per Week.  Dispense: 2 mL; Refill: 0  -     Aspirin 81 MG capsule; Take 1 capsule by mouth Daily.  Dispense: 90 capsule; Refill: 3    6. Anxiety  -     escitalopram (LEXAPRO) 20 MG tablet; Take 1 tablet by mouth Daily.  Dispense: 90 tablet; Refill: 3    7. Allergic rhinitis, unspecified seasonality, unspecified trigger  -     fluticasone (FLONASE) 50 MCG/ACT nasal spray; Administer 2 sprays into the nostril(s) as directed by provider Daily.  Dispense: 16 g; Refill: 6  -     Loratadine 10 MG capsule; Take 1 capsule by mouth Daily.  Dispense: 90 each; Refill: 3  -     montelukast (SINGULAIR) 10 MG tablet; Take 1 tablet by mouth Every Night.  Dispense: 90 tablet; Refill: 3    8. Mild persistent asthma without complication  -     fluticasone (FLONASE) 50 MCG/ACT nasal spray; Administer 2 sprays into the nostril(s) as  directed by provider Daily.  Dispense: 16 g; Refill: 6  -     montelukast (SINGULAIR) 10 MG tablet; Take 1 tablet by mouth Every Night.  Dispense: 90 tablet; Refill: 3

## 2024-12-26 ENCOUNTER — TELEPHONE (OUTPATIENT)
Dept: INTERNAL MEDICINE | Facility: CLINIC | Age: 64
End: 2024-12-26

## 2024-12-26 NOTE — TELEPHONE ENCOUNTER
Caller: Mary Jo Brizuela    Relationship: Self    Best call back number: [phone]        What was the call regarding: PATIENT CALLING STATES SHE CAME IN THE OFFICE ON 12/10/24 WAS SEEN AND HAS STILL BEEN SICK SINCE LAST VISIT      PATIENT NOW COUGHING UP GREEN MUCUS AND WANTED TO SEE IF SHE COULD GET AN ANTIBIOTIC CALLED IN     PATIENT ALSO ASKED FOR PREDNISONE       [facility] [id number] [facility] [place] [facility] [address] - [phone] PH - [phone] F

## 2024-12-27 DIAGNOSIS — J45.41 MODERATE PERSISTENT ASTHMA WITH EXACERBATION: Primary | ICD-10-CM

## 2024-12-27 NOTE — TELEPHONE ENCOUNTER
Pt needs to be seen if she is not better.  She just finished a steroid pack and zpack less than 10 days ago

## 2024-12-27 NOTE — TELEPHONE ENCOUNTER
Pt saw pcp on 12/17 and cough was addressed with asthma. No upcoming available appts. Is it possible for something to be send for pt due to recent appt or will they need a f/u with UC sooner?

## 2024-12-30 NOTE — TELEPHONE ENCOUNTER
Spoke to pt. She is feeling better but is still having to use inhalers extra. She will be going out of town in two weeks and will be out of town for two weeks. Would you be ok with refilling inhalers early?

## 2025-01-02 DIAGNOSIS — J45.41 MODERATE PERSISTENT ASTHMA WITH EXACERBATION: ICD-10-CM

## 2025-01-02 RX ORDER — BUDESONIDE AND FORMOTEROL FUMARATE DIHYDRATE 160; 4.5 UG/1; UG/1
2 AEROSOL RESPIRATORY (INHALATION)
Qty: 10.2 G | Refills: 12 | Status: SHIPPED | OUTPATIENT
Start: 2025-01-02

## 2025-01-02 RX ORDER — ALBUTEROL SULFATE 90 UG/1
2 INHALANT RESPIRATORY (INHALATION) EVERY 4 HOURS PRN
Qty: 18 G | Refills: 6 | Status: SHIPPED | OUTPATIENT
Start: 2025-01-02

## 2025-01-08 ENCOUNTER — TELEPHONE (OUTPATIENT)
Dept: INTERNAL MEDICINE | Facility: CLINIC | Age: 65
End: 2025-01-08

## 2025-01-08 NOTE — TELEPHONE ENCOUNTER
Caller: Mary Jo Brizuela    Relationship: Self    Best call back number: [phone]        What was the call regarding: PATIENT HAS HAD A COLD FOR 3 WEEKS AND A WEEK AGO IT WENT AWAY  AND  NOW  ITS COME BACK     YESTERDAY IT CAME  BACK COUGH/ ALLERGIES/SINUS    PATIENT WOULD LIKE A NEBULIZER ORDERED    PATIENT HAS AN APPOINTMENT THIS FRIDAY 1/10/25     PLEASE GIVE CALLBACK IF NEBULIZER IS ORDERED

## 2025-01-10 ENCOUNTER — OFFICE VISIT (OUTPATIENT)
Dept: INTERNAL MEDICINE | Facility: CLINIC | Age: 65
End: 2025-01-10
Payer: COMMERCIAL

## 2025-01-10 VITALS
BODY MASS INDEX: 28.87 KG/M2 | TEMPERATURE: 97.8 F | DIASTOLIC BLOOD PRESSURE: 80 MMHG | HEART RATE: 88 BPM | SYSTOLIC BLOOD PRESSURE: 112 MMHG | WEIGHT: 163 LBS | OXYGEN SATURATION: 96 %

## 2025-01-10 DIAGNOSIS — J45.41 MODERATE PERSISTENT ASTHMA WITH ACUTE EXACERBATION: Primary | ICD-10-CM

## 2025-01-10 DIAGNOSIS — R09.81 CONGESTION OF NASAL SINUS: ICD-10-CM

## 2025-01-10 PROCEDURE — 99214 OFFICE O/P EST MOD 30 MIN: CPT | Performed by: INTERNAL MEDICINE

## 2025-01-10 RX ORDER — PREDNISONE 10 MG/1
TABLET ORAL
Qty: 17 TABLET | Refills: 0 | Status: SHIPPED | OUTPATIENT
Start: 2025-01-10 | End: 2025-01-24

## 2025-01-10 RX ORDER — ALBUTEROL SULFATE 0.83 MG/ML
2.5 SOLUTION RESPIRATORY (INHALATION) EVERY 4 HOURS PRN
Qty: 30 ML | Refills: 12 | Status: SHIPPED | OUTPATIENT
Start: 2025-01-10

## 2025-01-10 NOTE — PROGRESS NOTES
"Chief Complaint  URI (Is still working with the allergy attack from last time but took a nebulizer treatment and said it helped )    Subjective        Mary Jo Brizuela presents to [facility]  URI         Cold air is really triggering her this season.  Had stopped the daily Symbicort and then symptoms re-flared.      Objective   Vital Signs:  /80 (BP Location: Right arm, Patient Position: Sitting, Cuff Size: Adult)   Pulse 88   Temp 97.8 °F (36.6 °C) (Infrared)   Wt 73.9 kg (163 lb)   SpO2 96%   BMI 28.87 kg/m²   Estimated body mass index is 28.87 kg/m² as calculated from the following:    Height as of 12/17/24: 160 cm (63\").    Weight as of this encounter: 73.9 kg (163 lb).            Physical Exam  Vitals reviewed.   Constitutional:       General: She is not in acute distress.     Appearance: Normal appearance.   HENT:      Head: Normocephalic and atraumatic.      Nose: Nose normal.      Mouth/Throat:      Mouth: Mucous membranes are moist.   Eyes:      Conjunctiva/sclera: Conjunctivae normal.   Cardiovascular:      Rate and Rhythm: Normal rate and regular rhythm.   Pulmonary:      Effort: Pulmonary effort is normal.      Comments: Diminished throughout.  No wheezing, but not moving a lot of air.   Skin:     General: Skin is warm and dry.   Neurological:      General: No focal deficit present.      Mental Status: She is alert.   Psychiatric:         Mood and Affect: Mood normal.        Result Review :         Assessment and Plan   Diagnoses and all orders for this visit:    1. Moderate persistent asthma with acute exacerbation (Primary)  -     Ambulatory Referral to Allergy  -     predniSONE (DELTASONE) 10 MG tablet; Take 2 tablets by mouth Daily for 5 days, THEN 1 tablet Daily for 5 days, THEN 0.5 tablets Daily for 4 days.  Dispense: 17 tablet; Refill: 0  -     albuterol (PROVENTIL) (2.5 MG/3ML) 0.083% nebulizer solution; Take 2.5 mg by nebulization Every 4 (Four) Hours As " Needed for Wheezing.  Dispense: 30 mL; Refill: 12    2. Congestion of nasal sinus    Chronic condition, acute worsening:    Treat as exacerbation.  Advised that she must stay on BID symbicort at least until the end of January.  Gave spacer and demonstrated use.  Also gave nebulizer for albuterol.    Referred for allergy testing when she returns from [place].     Regimen: daily singulair, BID symbicort, BID (AM and PM) albuterol neb, start 2 week steroid taper         Follow Up   Keep March appointment  Patient was given instructions and counseling regarding her condition or for health maintenance advice. Please see specific information pulled into the AVS if appropriate.

## 2025-03-18 ENCOUNTER — OFFICE VISIT (OUTPATIENT)
Dept: INTERNAL MEDICINE | Facility: CLINIC | Age: 65
End: 2025-03-18
Payer: COMMERCIAL

## 2025-03-18 VITALS
HEIGHT: 62 IN | OXYGEN SATURATION: 97 % | TEMPERATURE: 97.7 F | WEIGHT: 205.2 LBS | BODY MASS INDEX: 37.76 KG/M2 | SYSTOLIC BLOOD PRESSURE: 120 MMHG | DIASTOLIC BLOOD PRESSURE: 68 MMHG | HEART RATE: 89 BPM | RESPIRATION RATE: 18 BRPM

## 2025-03-18 DIAGNOSIS — R07.2 PRECORDIAL PAIN: ICD-10-CM

## 2025-03-18 DIAGNOSIS — J45.40 MODERATE PERSISTENT ASTHMA WITHOUT COMPLICATION: Primary | ICD-10-CM

## 2025-03-18 PROCEDURE — 99214 OFFICE O/P EST MOD 30 MIN: CPT | Performed by: INTERNAL MEDICINE

## 2025-03-18 RX ORDER — PREDNISONE 10 MG/1
10 TABLET ORAL DAILY
Qty: 5 TABLET | Refills: 0 | Status: SHIPPED | OUTPATIENT
Start: 2025-03-18

## 2025-03-18 NOTE — PROGRESS NOTES
"Chief Complaint  Anxiety and Nasal Congestion (Hoarseness x 10 months)    Subjective        Mary Jo Brizuela presents to [facility]  History of Present Illness    Chest tightness, exhaustion, having some discomfort between her shoulder blades.  Every time she gets well enough to exercise, she feels like the process re-starts.  Wearing a bra is bothering her back and into her chest.     Hoarseness present, but no issues with swallowing.     Objective   Vital Signs:  /68 (BP Location: Left arm, Patient Position: Sitting, Cuff Size: Large Adult)   Pulse 89   Temp 97.7 °F (36.5 °C) (Infrared)   Resp 18   Ht 157.5 cm (62\")   Wt 93.1 kg (205 lb 3.2 oz)   SpO2 97%   BMI 37.53 kg/m²   Estimated body mass index is 37.53 kg/m² as calculated from the following:    Height as of this encounter: 157.5 cm (62\").    Weight as of this encounter: 93.1 kg (205 lb 3.2 oz).            Physical Exam  Vitals reviewed.   Constitutional:       General: She is not in acute distress.     Appearance: Normal appearance.   HENT:      Head: Normocephalic and atraumatic.      Nose: Nose normal.      Mouth/Throat:      Mouth: Mucous membranes are moist.   Eyes:      Conjunctiva/sclera: Conjunctivae normal.   Cardiovascular:      Rate and Rhythm: Normal rate and regular rhythm.   Pulmonary:      Effort: Pulmonary effort is normal.      Breath sounds: Wheezing present.      Comments: Diminished breath sounds throughout  Skin:     General: Skin is warm and dry.   Neurological:      General: No focal deficit present.      Mental Status: She is alert.   Psychiatric:         Mood and Affect: Mood normal.        Result Review :           Assessment and Plan     Diagnoses and all orders for this visit:    1. Moderate persistent asthma without complication (Primary)  -     predniSONE (DELTASONE) 10 MG tablet; Take 1 tablet by mouth Daily.  Dispense: 5 tablet; Refill: 0    2. Precordial pain  -     Stress Test With " Myocardial Perfusion One Day      Symbicort daily for the foreseeable future.  Low dose prednisone for exacerbation.  Explained need for symbicort and the reason it should be daily.    Stress testing to r/o any cardiac cause.          Follow Up   Return in about 5 weeks (around 4/22/2025) for f/u stress testing and asthma.  Patient was given instructions and counseling regarding her condition or for health maintenance advice. Please see specific information pulled into the AVS if appropriate.

## 2025-03-18 NOTE — PATIENT INSTRUCTIONS
Make sure you take daily Symbicort inhaler twice a day.  You can use albuterol twice daily before the Symbicort.  Take 5 days of the Prednisone and then continue the two inhalers.

## 2025-03-27 ENCOUNTER — HOSPITAL ENCOUNTER (OUTPATIENT)
Dept: NUCLEAR MEDICINE | Facility: HOSPITAL | Age: 65
Discharge: HOME | End: 2025-03-27
Payer: COMMERCIAL

## 2025-03-27 ENCOUNTER — RESULTS FOLLOW-UP (OUTPATIENT)
Dept: INTERNAL MEDICINE | Facility: CLINIC | Age: 65
End: 2025-03-27
Payer: COMMERCIAL

## 2025-03-27 ENCOUNTER — HOSPITAL ENCOUNTER (OUTPATIENT)
Dept: CARDIOLOGY | Facility: HOSPITAL | Age: 65
Discharge: HOME | End: 2025-03-27
Payer: COMMERCIAL

## 2025-03-27 DIAGNOSIS — R06.02 SHORTNESS OF BREATH: Primary | ICD-10-CM

## 2025-03-27 LAB
BH CV REST NUCLEAR ISOTOPE DOSE: 10.9 MCI
BH CV STRESS BP STAGE 1: NORMAL
BH CV STRESS COMMENTS STAGE 1: NORMAL
BH CV STRESS DOSE REGADENOSON STAGE 1: 0.4
BH CV STRESS DURATION MIN STAGE 1: 0
BH CV STRESS DURATION SEC STAGE 1: 30
BH CV STRESS HR STAGE 1: 79
BH CV STRESS NUCLEAR ISOTOPE DOSE: 33.2 MCI
BH CV STRESS O2 STAGE 1: 95
BH CV STRESS PROTOCOL 1: NORMAL
BH CV STRESS RECOVERY BP: NORMAL MMHG
BH CV STRESS RECOVERY HR: 92 BPM
BH CV STRESS RECOVERY O2: 97 %
BH CV STRESS STAGE 1: 1
MAXIMAL PREDICTED HEART RATE: 158 BPM
PERCENT MAX PREDICTED HR: 64.56 %
SPECT HRT GATED+EF W RNC IV: 78 %
STRESS BASELINE BP: NORMAL MMHG
STRESS BASELINE HR: 77 BPM
STRESS O2 SAT REST: 96 %
STRESS PERCENT HR: 76 %
STRESS POST ESTIMATED WORKLOAD: 1 METS
STRESS POST EXERCISE DUR SEC: 30 SEC
STRESS POST O2 SAT PEAK: 99 %
STRESS POST PEAK BP: NORMAL MMHG
STRESS POST PEAK HR: 102 BPM
STRESS TARGET HR: 134 BPM

## 2025-03-27 PROCEDURE — A9500 TC99M SESTAMIBI: HCPCS | Performed by: INTERNAL MEDICINE

## 2025-03-27 PROCEDURE — 93017 CV STRESS TEST TRACING ONLY: CPT

## 2025-03-27 PROCEDURE — 78452 HT MUSCLE IMAGE SPECT MULT: CPT

## 2025-03-27 PROCEDURE — 25010000002 REGADENOSON 0.4 MG/5ML SOLUTION: Performed by: INTERNAL MEDICINE

## 2025-03-27 PROCEDURE — 34310000005 TECHNETIUM SESTAMIBI: Performed by: INTERNAL MEDICINE

## 2025-03-27 RX ORDER — REGADENOSON 0.08 MG/ML
0.4 INJECTION, SOLUTION INTRAVENOUS
Status: COMPLETED | OUTPATIENT
Start: 2025-03-27 | End: 2025-03-27

## 2025-03-27 RX ADMIN — REGADENOSON 0.4 MG: 0.08 INJECTION, SOLUTION INTRAVENOUS at 10:03

## 2025-03-27 RX ADMIN — TECHNETIUM TC 99M SESTAMIBI 1 DOSE: 1 INJECTION INTRAVENOUS at 08:05

## 2025-03-27 RX ADMIN — TECHNETIUM TC 99M SESTAMIBI 1 DOSE: 1 INJECTION INTRAVENOUS at 10:03

## 2025-05-05 ENCOUNTER — TELEPHONE (OUTPATIENT)
Dept: INTERNAL MEDICINE | Facility: CLINIC | Age: 65
End: 2025-05-05

## 2025-05-05 NOTE — TELEPHONE ENCOUNTER
Caller: Mary Jo Brizuela    Relationship to patient: Self    Best call back number: [phone]     Type of visit: PHYSICAL W PAP    Requested date: AFTER 06/20 BEFORE 06/30    Additional notes: DUE TO INSURANCE SHE IS NEEDING TO GET AN APPT AFTER 06/20 BUT REQUIRES IT TO BE COMPLETED BEFORE THE END OF THE MONTH. DR RAMIREZ DOES NOT HAVE ANYTHING TIL SEP

## 2025-05-12 ENCOUNTER — TELEPHONE (OUTPATIENT)
Dept: INTERNAL MEDICINE | Facility: CLINIC | Age: 65
End: 2025-05-12
Payer: COMMERCIAL

## 2025-05-12 NOTE — TELEPHONE ENCOUNTER
"OK FOR HUB TO READ.  Patient called and LVM on 05/12/2025 at 12:10am stating \"calling because she had seen the specialist and he recommended that she see her PCP for reflux or GERD.\" I called the patient back and LVM with a reserved date and time. Just need to confirm that she is able to make it or reschedule if not.  "

## 2025-05-13 ENCOUNTER — OFFICE VISIT (OUTPATIENT)
Dept: INTERNAL MEDICINE | Facility: CLINIC | Age: 65
End: 2025-05-13
Payer: COMMERCIAL

## 2025-05-13 VITALS
SYSTOLIC BLOOD PRESSURE: 104 MMHG | TEMPERATURE: 97.7 F | WEIGHT: 204 LBS | DIASTOLIC BLOOD PRESSURE: 66 MMHG | BODY MASS INDEX: 34.83 KG/M2 | HEIGHT: 64 IN | HEART RATE: 76 BPM | OXYGEN SATURATION: 96 % | RESPIRATION RATE: 16 BRPM

## 2025-05-13 DIAGNOSIS — K21.9 GASTROESOPHAGEAL REFLUX DISEASE WITHOUT ESOPHAGITIS: Primary | ICD-10-CM

## 2025-05-13 DIAGNOSIS — R10.11 RUQ PAIN: ICD-10-CM

## 2025-05-13 PROCEDURE — 99214 OFFICE O/P EST MOD 30 MIN: CPT | Performed by: INTERNAL MEDICINE

## 2025-05-13 RX ORDER — FAMOTIDINE 40 MG/1
40 TABLET, FILM COATED ORAL DAILY
Qty: 90 TABLET | Refills: 3 | Status: SHIPPED | OUTPATIENT
Start: 2025-05-13

## 2025-05-13 RX ORDER — FLUTICASONE FUROATE, UMECLIDINIUM BROMIDE AND VILANTEROL TRIFENATATE 200; 62.5; 25 UG/1; UG/1; UG/1
1 POWDER RESPIRATORY (INHALATION)
COMMUNITY
Start: 2025-05-09

## 2025-05-13 NOTE — PROGRESS NOTES
"Chief Complaint  Abdominal Pain (Epigastric with RUQ that radiates to RLQ and right flank pain (dull/ache) - has diarrhea after eating)    Subjective        Mary Jo Brizuela presents to [facility]  History of Present Illness    Saw pulmonologist on Friday, changed inhaler to trellegy. Recommended at that time for assessment for possible GERD. She has had pain that started in the middle of her back and transitioned to epigastric region. Has not tried any acid suppression medications. Had an episode of this last fall as well. No bad taste in her mouth, does cause hoarseness. Has regurgitation in her mouth. Has pain wearing tight clothing on chest. Pain is worse after eating \"heavy\" foods such as fried, greasy, fatty. Does have some diarrhea intermittently with constipation. Does have family hx of gallbladder problems.       Objective   Vital Signs:  /66 (BP Location: Left arm, Patient Position: Sitting, Cuff Size: Large Adult)   Pulse 76   Temp 97.7 °F (36.5 °C) (Infrared)   Resp 16   Ht 162.6 cm (64\")   Wt 92.5 kg (204 lb)   SpO2 96%   BMI 35.02 kg/m²   Estimated body mass index is 35.02 kg/m² as calculated from the following:    Height as of this encounter: 162.6 cm (64\").    Weight as of this encounter: 92.5 kg (204 lb).            Physical Exam  Constitutional:       General: She is not in acute distress.     Appearance: Normal appearance. She is not ill-appearing.   HENT:      Head: Normocephalic and atraumatic.      Right Ear: External ear normal.      Left Ear: External ear normal.      Nose: Nose normal. No congestion.      Mouth/Throat:      Mouth: Mucous membranes are moist.      Pharynx: Oropharynx is clear. No oropharyngeal exudate or posterior oropharyngeal erythema.   Eyes:      Extraocular Movements: Extraocular movements intact.      Conjunctiva/sclera: Conjunctivae normal.      Pupils: Pupils are equal, round, and reactive to light.   Cardiovascular:      Rate " and Rhythm: Normal rate and regular rhythm.      Heart sounds: Normal heart sounds.   Pulmonary:      Effort: Pulmonary effort is normal. No respiratory distress.      Breath sounds: Normal breath sounds. No wheezing or rales.   Abdominal:      General: Bowel sounds are normal.      Palpations: Abdomen is soft.      Tenderness: There is abdominal tenderness (tender to palpation in RUQ, RLQ. No epigastric tenderness). There is no guarding or rebound.   Skin:     General: Skin is warm.   Neurological:      General: No focal deficit present.      Mental Status: She is alert.   Psychiatric:         Mood and Affect: Mood normal.         Behavior: Behavior normal.        Result Review :                Assessment and Plan   Diagnoses and all orders for this visit:    1. Gastroesophageal reflux disease without esophagitis (Primary)  -     famotidine (Pepcid) 40 MG tablet; Take 1 tablet by mouth Daily.  Dispense: 90 tablet; Refill: 3    2. RUQ pain  -     US Gallbladder      Given description of RUQ pain that radiates to lower quadrant that is worse after eating fatty, greasy food, I am most concerned for possible cholelithiasis vs cholestasis. Will obtain RUQ US to rule out. Other considerations are pancreatitis, however she has no sharp pain to back, no vomiting, no severe pain. Could consider lipase/amylase if RUQ US not diagnostic for gallbladder etiology. Will also trial pepcid for GERD sx at this time, discussed that she can start at 40mg daily but can increase to BID if needed. F/u at annual in 1 mo.          Follow Up   Return in about 1 month (around 6/13/2025) for Annual physical.  Patient was given instructions and counseling regarding her condition or for health maintenance advice. Please see specific information pulled into the AVS if appropriate.       I have seen and examined the patient independently.  Agree with above.     Winter Danielle MD  Attending physician  Internal Medicine and Pediatrics

## 2025-05-21 ENCOUNTER — HOSPITAL ENCOUNTER (OUTPATIENT)
Dept: ULTRASOUND IMAGING | Facility: HOSPITAL | Age: 65
Discharge: HOME | End: 2025-05-21
Admitting: INTERNAL MEDICINE
Payer: COMMERCIAL

## 2025-05-21 PROCEDURE — 76705 ECHO EXAM OF ABDOMEN: CPT

## 2025-05-23 ENCOUNTER — RESULTS FOLLOW-UP (OUTPATIENT)
Dept: INTERNAL MEDICINE | Facility: CLINIC | Age: 65
End: 2025-05-23
Payer: COMMERCIAL

## 2025-05-23 DIAGNOSIS — Z80.0 FAMILY HISTORY OF LIVER CANCER: ICD-10-CM

## 2025-05-23 DIAGNOSIS — K76.89 LIVER CYST: Primary | ICD-10-CM

## 2025-06-03 ENCOUNTER — HOSPITAL ENCOUNTER (EMERGENCY)
Facility: HOSPITAL | Age: 65
Discharge: HOME | End: 2025-06-03
Attending: EMERGENCY MEDICINE | Admitting: EMERGENCY MEDICINE
Payer: COMMERCIAL

## 2025-06-03 ENCOUNTER — TELEPHONE (OUTPATIENT)
Dept: INTERNAL MEDICINE | Facility: CLINIC | Age: 65
End: 2025-06-03
Payer: COMMERCIAL

## 2025-06-03 ENCOUNTER — APPOINTMENT (OUTPATIENT)
Dept: CT IMAGING | Facility: HOSPITAL | Age: 65
End: 2025-06-03
Payer: COMMERCIAL

## 2025-06-03 VITALS
OXYGEN SATURATION: 96 % | WEIGHT: 203.6 LBS | HEIGHT: 64 IN | RESPIRATION RATE: 17 BRPM | HEART RATE: 77 BPM | BODY MASS INDEX: 34.76 KG/M2 | DIASTOLIC BLOOD PRESSURE: 81 MMHG | TEMPERATURE: 98.6 F | SYSTOLIC BLOOD PRESSURE: 123 MMHG

## 2025-06-03 DIAGNOSIS — K76.89 LIVER CYST: ICD-10-CM

## 2025-06-03 DIAGNOSIS — R10.11 RUQ ABDOMINAL PAIN: Primary | ICD-10-CM

## 2025-06-03 LAB
ALBUMIN SERPL-MCNC: 4.8 G/DL (ref 3.5–5.2)
ALBUMIN/GLOB SERPL: 1.8 G/DL
ALP SERPL-CCNC: 84 U/L (ref 39–117)
ALT SERPL W P-5'-P-CCNC: 22 U/L (ref 1–33)
ANION GAP SERPL CALCULATED.3IONS-SCNC: 13.1 MMOL/L (ref 5–15)
AST SERPL-CCNC: 26 U/L (ref 1–32)
BASOPHILS # BLD AUTO: 0.1 10*3/MM3 (ref 0–0.2)
BASOPHILS NFR BLD AUTO: 0.9 % (ref 0–1.5)
BILIRUB SERPL-MCNC: 0.3 MG/DL (ref 0–1.2)
BILIRUB UR QL STRIP: NEGATIVE
BUN SERPL-MCNC: 14.7 MG/DL (ref 8–23)
BUN/CREAT SERPL: 19.6 (ref 7–25)
CALCIUM SPEC-SCNC: 9.5 MG/DL (ref 8.6–10.5)
CHLORIDE SERPL-SCNC: 105 MMOL/L (ref 98–107)
CLARITY UR: CLEAR
CO2 SERPL-SCNC: 22.9 MMOL/L (ref 22–29)
COLOR UR: YELLOW
CREAT SERPL-MCNC: 0.75 MG/DL (ref 0.57–1)
DEPRECATED RDW RBC AUTO: 44.7 FL (ref 37–54)
EGFRCR SERPLBLD CKD-EPI 2021: 90.1 ML/MIN/1.73
EOSINOPHIL # BLD AUTO: 0.23 10*3/MM3 (ref 0–0.4)
EOSINOPHIL NFR BLD AUTO: 2.1 % (ref 0.3–6.2)
ERYTHROCYTE [DISTWIDTH] IN BLOOD BY AUTOMATED COUNT: 12.6 % (ref 12.3–15.4)
GLOBULIN UR ELPH-MCNC: 2.7 GM/DL
GLUCOSE SERPL-MCNC: 107 MG/DL (ref 65–99)
GLUCOSE UR STRIP-MCNC: NEGATIVE MG/DL
HCT VFR BLD AUTO: 48.8 % (ref 34–46.6)
HGB BLD-MCNC: 16 G/DL (ref 12–15.9)
HGB UR QL STRIP.AUTO: NEGATIVE
HOLD SPECIMEN: NORMAL
HOLD SPECIMEN: NORMAL
IMM GRANULOCYTES # BLD AUTO: 0.04 10*3/MM3 (ref 0–0.05)
IMM GRANULOCYTES NFR BLD AUTO: 0.4 % (ref 0–0.5)
KETONES UR QL STRIP: NEGATIVE
LEUKOCYTE ESTERASE UR QL STRIP.AUTO: NEGATIVE
LIPASE SERPL-CCNC: 33 U/L (ref 13–60)
LYMPHOCYTES # BLD AUTO: 2.78 10*3/MM3 (ref 0.7–3.1)
LYMPHOCYTES NFR BLD AUTO: 25.7 % (ref 19.6–45.3)
MCH RBC QN AUTO: 31.6 PG (ref 26.6–33)
MCHC RBC AUTO-ENTMCNC: 32.8 G/DL (ref 31.5–35.7)
MCV RBC AUTO: 96.4 FL (ref 79–97)
MONOCYTES # BLD AUTO: 0.62 10*3/MM3 (ref 0.1–0.9)
MONOCYTES NFR BLD AUTO: 5.7 % (ref 5–12)
NEUTROPHILS NFR BLD AUTO: 65.2 % (ref 42.7–76)
NEUTROPHILS NFR BLD AUTO: 7.03 10*3/MM3 (ref 1.7–7)
NITRITE UR QL STRIP: NEGATIVE
NRBC BLD AUTO-RTO: 0 /100 WBC (ref 0–0.2)
PH UR STRIP.AUTO: 6 [PH] (ref 4.5–8)
PLATELET # BLD AUTO: 227 10*3/MM3 (ref 140–450)
PMV BLD AUTO: 9.9 FL (ref 6–12)
POTASSIUM SERPL-SCNC: 4.3 MMOL/L (ref 3.5–5.2)
PROT SERPL-MCNC: 7.5 G/DL (ref 6–8.5)
PROT UR QL STRIP: NEGATIVE
RBC # BLD AUTO: 5.06 10*6/MM3 (ref 3.77–5.28)
SODIUM SERPL-SCNC: 141 MMOL/L (ref 136–145)
SP GR UR STRIP: 1.02 (ref 1–1.03)
UROBILINOGEN UR QL STRIP: NORMAL
WBC NRBC COR # BLD AUTO: 10.8 10*3/MM3 (ref 3.4–10.8)
WHOLE BLOOD HOLD COAG: NORMAL
WHOLE BLOOD HOLD SPECIMEN: NORMAL

## 2025-06-03 PROCEDURE — 85025 COMPLETE CBC W/AUTO DIFF WBC: CPT | Performed by: EMERGENCY MEDICINE

## 2025-06-03 PROCEDURE — 80053 COMPREHEN METABOLIC PANEL: CPT | Performed by: EMERGENCY MEDICINE

## 2025-06-03 PROCEDURE — 83690 ASSAY OF LIPASE: CPT | Performed by: EMERGENCY MEDICINE

## 2025-06-03 PROCEDURE — 25510000001 IOPAMIDOL PER 1 ML: Performed by: EMERGENCY MEDICINE

## 2025-06-03 PROCEDURE — 74177 CT ABD & PELVIS W/CONTRAST: CPT

## 2025-06-03 PROCEDURE — 99285 EMERGENCY DEPT VISIT HI MDM: CPT | Performed by: EMERGENCY MEDICINE

## 2025-06-03 PROCEDURE — 81003 URINALYSIS AUTO W/O SCOPE: CPT | Performed by: EMERGENCY MEDICINE

## 2025-06-03 RX ORDER — IOPAMIDOL 755 MG/ML
100 INJECTION, SOLUTION INTRAVASCULAR
Status: COMPLETED | OUTPATIENT
Start: 2025-06-03 | End: 2025-06-03

## 2025-06-03 RX ADMIN — IOPAMIDOL 100 ML: 755 INJECTION, SOLUTION INTRAVENOUS at 18:07

## 2025-06-03 NOTE — TELEPHONE ENCOUNTER
Pt is going to [facility]. Could  put in orders if needed please?

## 2025-06-03 NOTE — TELEPHONE ENCOUNTER
Patient called due to having stomach described as blinding white pain. Unable to get comfortable at times. PCP ordered an MRI last week but no one has called her to schedule. Based off her symptoms and after talking to PCP patient was advised to go to ER. Patient will call back to let us know if and where she is to go so PCP can call a head.

## 2025-06-03 NOTE — ED PROVIDER NOTES
Subjective   History of Present Illness  Ruq abd pain, asssoc w/n/v, intermittent, several months,   Seen by pcp for same , neg u/s  Today worse        Review of Systems   All other systems reviewed and are negative.      Past Medical History:   Diagnosis Date    Acute sinusitis, unspecified     Allergic 25 years ago    Environmental and weather changes.  Daily use of saline, and two nasal sprays    Anemia     Anxiety 01/20/2022    Anxiety and depression     Anxiety disorder     Asymptomatic varicose veins of bilateral lower extremities     Cataract     Small cataracts that are checked yearly    Corns and callosities     Deep vein thrombosis Since early twenties    Old blood clot from injury adhered to inner wall of vein in left leg    Diabetes mellitus     Was pre diabetic late 2022 to fall 2023    Frequency of micturition     Grief reaction     NORMAL    Headache 25 years ago    Get migraines periodically with extreme vertigo, nausea and tiredness. Every large change in barometric pressure ensures me a migraine    History of mammogram 06/2020    normal, never abn,    HL (hearing loss)     Hyperlipidemia     Left fibular fracture 04/2017    Major depressive disorder, single episode, unspecified     Menopausal and female climacteric states     Mild persistent asthma without complication 01/20/2022    Non-scarring hair loss     Nonscarring hair loss, unspecified     Obesity     Osteopenia June 2023    Beginning of Osteoporosis.  On medical records    Other acute sinusitis     Other amnesia     Other chest pain     Other fatigue     Pain in unspecified shoulder     Panic disorder without agoraphobia     Papanicolaou smear 06/2019    neg with neg HPV, repeat 5 years, due 2024    Paresthesia of skin     Radiculopathy, cervical region     Recurrent sinusitis     Seasonal allergies     Unspecified asthma, uncomplicated     Unspecified disorder of synovium and tendon, left shoulder     Viral infection, unspecified      Vitamin D deficiency 06/13/2023       No Known Allergies    Past Surgical History:   Procedure Laterality Date    HYSTERECTOMY  2008    FIBROIDS    SUBTOTAL HYSTERECTOMY  2010    Partial    VASCULAR SURGERY  12/2023    bilateral legs       Family History   Problem Relation Age of Onset    Heart disease Mother     Asthma Mother         Allergy asthma    Vision loss Mother         Cataract surgery    Cancer Father         Blood & Bone    Vision loss Father         Eye surgery    Colon cancer Sister     Cancer Sister         Colon? Liver?    Colon polyps Brother     Anxiety disorder Brother         All five us siblings.   and both my children    Depression Brother         Me and three brothers.  Also, my late son    Alcohol abuse Brother         I don’t drink alcohol at all    Drug abuse Brother     Asthma Brother     Cancer Brother         Leukemia    Asthma Son     Early death Son         Took his life    Diabetes Maternal Grandmother     Vision loss Maternal Grandmother         Cataract surgery    Diabetes Paternal Grandmother     Breast cancer Neg Hx        Social History     Socioeconomic History    Marital status:     Number of children: 2   Tobacco Use    Smoking status: Never     Passive exposure: Never    Smokeless tobacco: Never   Vaping Use    Vaping status: Never Used   Substance and Sexual Activity    Alcohol use: Never    Drug use: Never    Sexual activity: Not Currently     Partners: Male     Birth control/protection: Abstinence           Objective   Physical Exam  Vitals and nursing note reviewed.   Constitutional:       Appearance: She is well-developed. She is not ill-appearing or diaphoretic.   HENT:      Head: Normocephalic and atraumatic.   Eyes:      Extraocular Movements: Extraocular movements intact.      Pupils: Pupils are equal, round, and reactive to light.   Cardiovascular:      Heart sounds: Normal heart sounds.   Pulmonary:      Effort: Pulmonary effort is normal.      Breath  sounds: Normal breath sounds.   Abdominal:      General: Abdomen is flat.      Palpations: Abdomen is soft.      Tenderness: There is abdominal tenderness in the right upper quadrant. There is no guarding or rebound. Negative signs include Tejada's sign and McBurney's sign.   Skin:     General: Skin is warm.   Neurological:      General: No focal deficit present.      Mental Status: She is alert and oriented to person, place, and time.   Psychiatric:         Behavior: Behavior normal.         Procedures           ED Course  ED Course as of 06/03/25 1931 Tue Jun 03, 2025 1930 Reeval, stable exam, discussed results, ok with plan to f/u pcp as scheduled for further liver w/u [BC]      ED Course User Index  [BC] Dimitrios He MD                                                       Medical Decision Making  Problems Addressed:  Liver cyst: complicated acute illness or injury  RUQ abdominal pain: complicated acute illness or injury    Amount and/or Complexity of Data Reviewed  Labs: ordered.  Radiology: ordered.    Risk  Prescription drug management.        Final diagnoses:   RUQ abdominal pain   Liver cyst       ED Disposition  ED Disposition       ED Disposition   Discharge    Condition   Stable    Comment   --               Winter Danielle MD  [address] [id number] [phone]    Schedule an appointment as soon as possible for a visit       [facility] EMERGENCY DEPARTMENT  [address] [facility] [address] [phone]    As needed         Medication List      No changes were made to your prescriptions during this visit.            Dimitrios He MD  06/03/25 1931

## 2025-06-04 ENCOUNTER — OFFICE VISIT (OUTPATIENT)
Dept: INTERNAL MEDICINE | Facility: CLINIC | Age: 65
End: 2025-06-04
Payer: COMMERCIAL

## 2025-06-04 VITALS
HEIGHT: 64 IN | TEMPERATURE: 97.8 F | DIASTOLIC BLOOD PRESSURE: 74 MMHG | BODY MASS INDEX: 34.59 KG/M2 | RESPIRATION RATE: 18 BRPM | WEIGHT: 202.6 LBS | OXYGEN SATURATION: 95 % | SYSTOLIC BLOOD PRESSURE: 116 MMHG | HEART RATE: 84 BPM

## 2025-06-04 DIAGNOSIS — R10.11 RIGHT UPPER QUADRANT PAIN: Primary | ICD-10-CM

## 2025-06-04 PROCEDURE — 99214 OFFICE O/P EST MOD 30 MIN: CPT | Performed by: INTERNAL MEDICINE

## 2025-06-04 RX ORDER — POLYETHYLENE GLYCOL 3350 17 G/17G
17 POWDER, FOR SOLUTION ORAL DAILY
COMMUNITY

## 2025-06-05 ENCOUNTER — OFFICE VISIT (OUTPATIENT)
Dept: GASTROENTEROLOGY | Facility: CLINIC | Age: 65
End: 2025-06-05
Payer: COMMERCIAL

## 2025-06-05 VITALS
DIASTOLIC BLOOD PRESSURE: 74 MMHG | BODY MASS INDEX: 34.66 KG/M2 | SYSTOLIC BLOOD PRESSURE: 118 MMHG | WEIGHT: 203 LBS | HEIGHT: 64 IN

## 2025-06-05 DIAGNOSIS — Z12.11 ENCOUNTER FOR SCREENING FOR MALIGNANT NEOPLASM OF COLON: ICD-10-CM

## 2025-06-05 DIAGNOSIS — K59.04 CHRONIC IDIOPATHIC CONSTIPATION: ICD-10-CM

## 2025-06-05 DIAGNOSIS — R10.11 RIGHT UPPER QUADRANT ABDOMINAL PAIN: Primary | ICD-10-CM

## 2025-06-05 NOTE — PROGRESS NOTES
"    PATIENT INFORMATION  Mary Jo Brizuela       - 1960    CHIEF COMPLAINT  Chief Complaint   Patient presents with    Abdominal Pain     RUQ       HISTORY OF PRESENT ILLNESS  No recall of a screening colonoscopy but her sister had metastatic colon cancer in her 60s    Her RUQ pain began last fall after some trauma.    Dissapated and then in March (with exercising)  but feels its worse two hours after being up she assoc putting on her bra and she feels food makes it worse but an hour later has started eating smaller meals but no early satiety    Is improved with bms and feels she is constipated, and has had presyncope from abd cramps.    Abd surgeries were a JAYDEN with bladder lift.    Does have ibuprofen at home but not even weekly and only for HA so mostly once a month is the most        REVIEWED PERTINENT RESULTS/ LABS  No results found for: \"CASEREPORT\", \"FINALDX\"  Lab Results   Component Value Date    HGB 16.0 (H) 2025    MCV 96.4 2025     2025    ALT 22 2025    AST 26 2025    HGBA1C 6.00 (H) 12/10/2024    INR 1.01 2017    TRIG 108 12/10/2024      CT Abdomen Pelvis With Contrast  Result Date: 6/3/2025  Narrative: CT ABDOMEN PELVIS W CONTRAST Date of Exam: 6/3/2025 6:06 PM EDT Indication: ruq abd pain, intermittent, sev months. Comparison: None available. Technique: Axial CT images were obtained of the abdomen and pelvis following the uneventful intravenous administration of iodinated contrast. Sagittal and coronal reconstructions were performed.  Automated exposure control and iterative reconstruction methods were used. Findings: Partially calcified granulomas are seen in the medial lung bases. The lung bases are grossly clear. Mild diffuse hypoattenuation of the liver parenchyma compatible with hepatic steatosis. There is a round circumscribed 8.4 cm hypodensity within the right  hepatic lobe compatible with liver cyst. Otherwise unremarkable appearance of the " liver. Normal appearance of the gallbladder and bile ducts. Unremarkable appearance of the spleen, pancreas, adrenal glands, kidneys, ureters, bladder. The uterus is surgically absent. Unremarkable appearance of the ovaries. Normal appearance of the GI tract. Normal appendix. No abdominopelvic free fluid or fat stranding. There is atherosclerosis of the abdominal aorta with otherwise unremarkable appearance of the vasculature. Unremarkable appearance of the body wall soft tissues. No acute or suspicious bony findings.     Impression: Impression: No acute abdominopelvic findings. Large somewhat exophytic 8.4 cm right hepatic lobe cyst. Hepatic steatosis. Electronically Signed: Hector Delarosa MD  6/3/2025 7:10 PM EDT  Workstation ID: [id number]    US Gallbladder  Result Date: 5/21/2025  Narrative: US GALLBLADDER Date of Exam: 5/21/2025 3:39 PM EDT Indication: RUQ pain; rule out cholecystitis or cholelithiasis. Comparison: No comparisons available. Technique: Grayscale and color Doppler ultrasound evaluation of the right upper quadrant was performed. FINDINGS: The gallbladder demonstrates no evidence for gallstones, gallbladder wall thickening, or pericholecystic edema. There is no intrahepatic or extrahepatic biliary dilatation. The common bile duct measures approximately 4 mm. The liver is diffusely echogenic indicating fatty infiltration. An anechoic focus is noted at the inferior aspect of the right lobe of the liver indicating a cyst. This finding measures 9.1 x 8.8 x 10.4 cm. The visualized portions of the pancreatic head and proximal body are unremarkable. Screening evaluation of the right kidney demonstrates no evidence for hydronephrosis. Flow is demonstrated within the portal and hepatic veins on Doppler evaluation.     Impression: 1.Evidence for diffuse hepatic fatty infiltration. 2.A prominent hepatic cyst is noted at the inferior margin of the right lobe of the liver. 3.No evidence for  cholelithiasis/cholecystitis or biliary dilatation. Electronically Signed: Terry Bailey MD  5/21/2025 4:19 PM EDT  Workstation ID: [id number]      REVIEW OF SYSTEMS  Review of Systems   Constitutional:  Negative for activity change, chills, fever and unexpected weight change.   HENT:  Positive for trouble swallowing. Negative for congestion.    Eyes:  Negative for visual disturbance.   Respiratory:  Positive for cough and shortness of breath.    Cardiovascular:  Negative for chest pain and palpitations.   Gastrointestinal:  Positive for abdominal distention, abdominal pain, constipation, diarrhea, nausea and vomiting. Negative for blood in stool.        Reflux   Endocrine: Negative for cold intolerance and heat intolerance.   Genitourinary:  Negative for hematuria.   Musculoskeletal:  Negative for gait problem.   Skin:  Negative for color change.   Allergic/Immunologic: Negative for immunocompromised state.   Neurological:  Negative for weakness and light-headedness.   Hematological:  Negative for adenopathy.   Psychiatric/Behavioral:  Negative for sleep disturbance. The patient is not nervous/anxious.          ACTIVE PROBLEMS  Patient Active Problem List    Diagnosis     Right upper quadrant abdominal pain [R10.11]     Encounter for screening for malignant neoplasm of colon [Z12.11]     Vitamin D deficiency [E55.9]     Other hyperlipidemia [E78.49]     Pre-diabetes [R73.03]     Allergic rhinitis [J30.9]     Other constipation [K59.09]     Anxiety [F41.9]     Moderate persistent asthma without complication [J45.40]     Avulsion fracture of distal fibula [S82.839A]     Chest pain [R07.9]          PAST MEDICAL HISTORY  Past Medical History:   Diagnosis Date    Acute sinusitis, unspecified     Allergic 25 years ago    Environmental and weather changes.  Daily use of saline, and two nasal sprays    Anemia     Anxiety 01/20/2022    Anxiety and depression     Anxiety disorder     Asymptomatic varicose veins of bilateral  lower extremities     Cataract     Small cataracts that are checked yearly    Corns and callosities     Deep vein thrombosis Since early twenties    Old blood clot from injury adhered to inner wall of vein in left leg    Diabetes mellitus     Was pre diabetic late 2022 to fall 2023    Frequency of micturition     Grief reaction     NORMAL    Headache 25 years ago    Get migraines periodically with extreme vertigo, nausea and tiredness. Every large change in barometric pressure ensures me a migraine    History of mammogram 06/2020    normal, never abn,    HL (hearing loss)     Hyperlipidemia     Left fibular fracture 04/2017    Major depressive disorder, single episode, unspecified     Menopausal and female climacteric states     Mild persistent asthma without complication 01/20/2022    Non-scarring hair loss     Nonscarring hair loss, unspecified     Obesity     Osteopenia June 2023    Beginning of Osteoporosis.  On medical records    Other acute sinusitis     Other amnesia     Other chest pain     Other fatigue     Pain in unspecified shoulder     Panic disorder without agoraphobia     Papanicolaou smear 06/2019    neg with neg HPV, repeat 5 years, due 2024    Paresthesia of skin     Radiculopathy, cervical region     Recurrent sinusitis     Seasonal allergies     Unspecified asthma, uncomplicated     Unspecified disorder of synovium and tendon, left shoulder     Viral infection, unspecified     Vitamin D deficiency 06/13/2023         SURGICAL HISTORY  Past Surgical History:   Procedure Laterality Date    HYSTERECTOMY  2008    FIBROIDS    SUBTOTAL HYSTERECTOMY  2010    Partial    VASCULAR SURGERY  12/2023    bilateral legs         FAMILY HISTORY  Family History   Problem Relation Age of Onset    Heart disease Mother     Asthma Mother         Allergy asthma    Vision loss Mother         Cataract surgery    Cancer Father         Blood & Bone    Vision loss Father         Eye surgery    Colon cancer Sister     Cancer  Sister         Colon? Liver?    Colon polyps Brother     Anxiety disorder Brother         All five us siblings.   and both my children    Depression Brother         Me and three brothers.  Also, my late son    Alcohol abuse Brother         I don’t drink alcohol at all    Drug abuse Brother     Asthma Brother     Cancer Brother         Leukemia    Asthma Son     Early death Son         Took his life    Diabetes Maternal Grandmother     Vision loss Maternal Grandmother         Cataract surgery    Diabetes Paternal Grandmother     Breast cancer Neg Hx          SOCIAL HISTORY  Social History     Occupational History    Occupation: FULL TIME   Tobacco Use    Smoking status: Never     Passive exposure: Never    Smokeless tobacco: Never   Vaping Use    Vaping status: Never Used   Substance and Sexual Activity    Alcohol use: Never    Drug use: Never    Sexual activity: Not Currently     Partners: Male     Birth control/protection: Abstinence         CURRENT MEDICATIONS    Current Outpatient Medications:     albuterol (PROVENTIL) (2.5 MG/3ML) 0.083% nebulizer solution, Take 2.5 mg by nebulization Every 4 (Four) Hours As Needed for Wheezing., Disp: 30 mL, Rfl: 12    albuterol sulfate  (90 Base) MCG/ACT inhaler, Inhale 2 puffs Every 4 (Four) Hours As Needed for Wheezing or Shortness of Air., Disp: 18 g, Rfl: 6    Aspirin 81 MG capsule, Take 1 capsule by mouth Daily., Disp: 90 capsule, Rfl: 3    azelastine (ASTELIN) 0.1 % nasal spray, 1 spray into the nostril(s) as directed by provider Daily. Use in each nostril as directed, Disp: 30 mL, Rfl: 5    escitalopram (LEXAPRO) 20 MG tablet, Take 1 tablet by mouth Daily., Disp: 90 tablet, Rfl: 3    famotidine (Pepcid) 40 MG tablet, Take 1 tablet by mouth Daily., Disp: 90 tablet, Rfl: 3    fluticasone (FLONASE) 50 MCG/ACT nasal spray, Administer 2 sprays into the nostril(s) as directed by provider Daily., Disp: 16 g, Rfl: 6    Loratadine 10 MG capsule, Take 1 capsule by mouth  "Daily. (Patient taking differently: Take 1 capsule by mouth Every Night.), Disp: 90 each, Rfl: 3    montelukast (SINGULAIR) 10 MG tablet, Take 1 tablet by mouth Every Night., Disp: 90 tablet, Rfl: 3    polyethylene glycol (MiraLax) 17 g packet, Take 17 g by mouth Daily., Disp: , Rfl:     Trelegy Ellipta 200-62.5-25 MCG/ACT inhaler, Inhale 1 puff Daily., Disp: , Rfl:     VITAMIN D, ERGOCALCIFEROL, PO, Take 2,000 Units by mouth Every Night., Disp: , Rfl:     ALLERGIES  Patient has no known allergies.    VITALS  Vitals:    06/05/25 0843   BP: 118/74   Weight: 92.1 kg (203 lb)   Height: 162.6 cm (64\")       PHYSICAL EXAM  Debilities/Disabilities Identified: None  Emotional Behavior: Appropriate  Wt Readings from Last 3 Encounters:   06/05/25 92.1 kg (203 lb)   06/04/25 91.9 kg (202 lb 9.6 oz)   06/03/25 92.4 kg (203 lb 9.6 oz)     Ht Readings from Last 1 Encounters:   06/05/25 162.6 cm (64\")     Body mass index is 34.84 kg/m².  Physical Exam  Constitutional:       Appearance: She is well-developed. She is not diaphoretic.   Eyes:      General: No scleral icterus.     Conjunctiva/sclera: Conjunctivae normal.      Pupils: Pupils are equal, round, and reactive to light.   Neck:      Thyroid: No thyromegaly.   Cardiovascular:      Rate and Rhythm: Normal rate and regular rhythm.      Heart sounds: Normal heart sounds. No murmur heard.     No gallop.   Pulmonary:      Effort: Pulmonary effort is normal.      Breath sounds: Normal breath sounds. No wheezing or rales.   Abdominal:      General: Bowel sounds are normal. There is no distension or abdominal bruit.      Palpations: Abdomen is soft. There is no shifting dullness, fluid wave or mass.      Tenderness: There is no abdominal tenderness. There is no guarding. Negative signs include Tejada's sign.      Hernia: There is no hernia in the ventral area.   Musculoskeletal:         General: Normal range of motion.      Cervical back: Normal range of motion and neck supple. "   Lymphadenopathy:      Cervical: No cervical adenopathy.   Skin:     General: Skin is warm and dry.      Findings: No erythema or rash.   Neurological:      Mental Status: She is alert and oriented to person, place, and time.         CLINICAL DATA REVIEWED   reviewed previous lab results and integrated with today's visit, reviewed notes from other physicians and/or last GI encounter, reviewed previous endoscopy results and available photos, reviewed surgical pathology results from previous biopsies    ASSESSMENT  Diagnoses and all orders for this visit:    Right upper quadrant abdominal pain  -     Case Request; Standing  -     Case Request    Chronic idiopathic constipation    Encounter for screening for malignant neoplasm of colon  -     Case Request; Standing  -     Case Request    Other orders  -     Follow Anesthesia Guidelines / Protocol; Future  -     Obtain Informed Consent; Standing          PLAN  Double endoscopy in [place]     Supportive care for her Constipation    Return in about 3 months (around 9/5/2025).    I have discussed the above plan with the patient.  They verbalize understanding and are in agreement with the plan.  They have been advised to contact the office for any questions, concerns, or changes related to their health.

## 2025-06-11 DIAGNOSIS — J45.41 MODERATE PERSISTENT ASTHMA WITH EXACERBATION: ICD-10-CM

## 2025-06-11 DIAGNOSIS — E78.00 PURE HYPERCHOLESTEROLEMIA: ICD-10-CM

## 2025-06-11 DIAGNOSIS — R73.03 PREDIABETES: ICD-10-CM

## 2025-06-11 NOTE — PROGRESS NOTES
"Chief Complaint  Abdominal Pain (ER follow up//RUQ>RL back/flank)    Subjective        Mary Jo Brizuela presents to [facility] PRIMARY [facility]ARE  Abdominal Pain      Pain still severe and seen in the ER.  Reviewed note and labs.      Objective   Vital Signs:  /74 (BP Location: Left arm, Patient Position: Sitting, Cuff Size: Large Adult)   Pulse 84   Temp 97.8 °F (36.6 °C) (Infrared)   Resp 18   Ht 162.6 cm (64\")   Wt 91.9 kg (202 lb 9.6 oz)   SpO2 95%   BMI 34.78 kg/m²   Estimated body mass index is 34.78 kg/m² as calculated from the following:    Height as of this encounter: 162.6 cm (64\").    Weight as of this encounter: 91.9 kg (202 lb 9.6 oz).        Physical Exam  Vitals reviewed.   Constitutional:       General: She is not in acute distress.     Appearance: Normal appearance.   HENT:      Head: Normocephalic and atraumatic.      Nose: Nose normal.      Mouth/Throat:      Mouth: Mucous membranes are moist.   Eyes:      Conjunctiva/sclera: Conjunctivae normal.   Pulmonary:      Effort: Pulmonary effort is normal.   Abdominal:      Palpations: Abdomen is soft.      Tenderness: There is no abdominal tenderness.   Skin:     General: Skin is warm and dry.   Neurological:      General: No focal deficit present.      Mental Status: She is alert.   Psychiatric:         Mood and Affect: Mood normal.        Result Review :           Assessment and Plan   Diagnoses and all orders for this visit:    1. Right upper quadrant pain (Primary)  -     Ambulatory Referral to Gastroenterology      Refer to GI before MR abdomen returns given increasing/worsening pain.         Follow Up   Keep f/u  Patient was given instructions and counseling regarding her condition or for health maintenance advice. Please see specific information pulled into the AVS if appropriate.             "

## 2025-06-19 LAB
ALBUMIN SERPL-MCNC: 4.7 G/DL (ref 3.5–5.2)
ALBUMIN/GLOB SERPL: 2.4 G/DL
ALP SERPL-CCNC: 80 U/L (ref 39–117)
ALT SERPL-CCNC: 24 U/L (ref 1–33)
AST SERPL-CCNC: 21 U/L (ref 1–32)
BASOPHILS # BLD AUTO: 0.08 10*3/MM3 (ref 0–0.2)
BASOPHILS NFR BLD AUTO: 1 % (ref 0–1.5)
BILIRUB SERPL-MCNC: 0.4 MG/DL (ref 0–1.2)
BUN SERPL-MCNC: 12 MG/DL (ref 8–23)
BUN/CREAT SERPL: 15.4 (ref 7–25)
CALCIUM SERPL-MCNC: 9.6 MG/DL (ref 8.6–10.5)
CHLORIDE SERPL-SCNC: 105 MMOL/L (ref 98–107)
CHOLEST SERPL-MCNC: 220 MG/DL (ref 0–200)
CO2 SERPL-SCNC: 24.3 MMOL/L (ref 22–29)
CREAT SERPL-MCNC: 0.78 MG/DL (ref 0.57–1)
EGFRCR SERPLBLD CKD-EPI 2021: 86 ML/MIN/1.73
EOSINOPHIL # BLD AUTO: 0.2 10*3/MM3 (ref 0–0.4)
EOSINOPHIL NFR BLD AUTO: 2.6 % (ref 0.3–6.2)
ERYTHROCYTE [DISTWIDTH] IN BLOOD BY AUTOMATED COUNT: 11.9 % (ref 12.3–15.4)
GLOBULIN SER CALC-MCNC: 2 GM/DL
GLUCOSE SERPL-MCNC: 134 MG/DL (ref 65–99)
HBA1C MFR BLD: 6.3 % (ref 4.8–5.6)
HCT VFR BLD AUTO: 45.2 % (ref 34–46.6)
HDLC SERPL-MCNC: 46 MG/DL (ref 40–60)
HGB BLD-MCNC: 14.8 G/DL (ref 12–15.9)
IMM GRANULOCYTES # BLD AUTO: 0.02 10*3/MM3 (ref 0–0.05)
IMM GRANULOCYTES NFR BLD AUTO: 0.3 % (ref 0–0.5)
LDLC SERPL CALC-MCNC: 159 MG/DL (ref 0–100)
LDLC/HDLC SERPL: 3.41 {RATIO}
LYMPHOCYTES # BLD AUTO: 2.07 10*3/MM3 (ref 0.7–3.1)
LYMPHOCYTES NFR BLD AUTO: 26.7 % (ref 19.6–45.3)
MCH RBC QN AUTO: 31.4 PG (ref 26.6–33)
MCHC RBC AUTO-ENTMCNC: 32.7 G/DL (ref 31.5–35.7)
MCV RBC AUTO: 96 FL (ref 79–97)
MONOCYTES # BLD AUTO: 0.47 10*3/MM3 (ref 0.1–0.9)
MONOCYTES NFR BLD AUTO: 6.1 % (ref 5–12)
NEUTROPHILS # BLD AUTO: 4.92 10*3/MM3 (ref 1.7–7)
NEUTROPHILS NFR BLD AUTO: 63.3 % (ref 42.7–76)
NRBC BLD AUTO-RTO: 0 /100 WBC (ref 0–0.2)
PLATELET # BLD AUTO: 212 10*3/MM3 (ref 140–450)
POTASSIUM SERPL-SCNC: 4.1 MMOL/L (ref 3.5–5.2)
PROT SERPL-MCNC: 6.7 G/DL (ref 6–8.5)
RBC # BLD AUTO: 4.71 10*6/MM3 (ref 3.77–5.28)
SODIUM SERPL-SCNC: 142 MMOL/L (ref 136–145)
TRIGL SERPL-MCNC: 85 MG/DL (ref 0–150)
VLDLC SERPL CALC-MCNC: 15 MG/DL (ref 5–40)
WBC # BLD AUTO: 7.76 10*3/MM3 (ref 3.4–10.8)

## 2025-06-25 ENCOUNTER — HOSPITAL ENCOUNTER (OUTPATIENT)
Dept: MRI IMAGING | Facility: HOSPITAL | Age: 65
Discharge: HOME | End: 2025-06-25
Admitting: INTERNAL MEDICINE
Payer: COMMERCIAL

## 2025-06-25 PROCEDURE — 74183 MRI ABD W/O CNTR FLWD CNTR: CPT

## 2025-06-25 PROCEDURE — A9573 GADOPICLENOL 0.5 MMOL/ML SOLUTION: HCPCS | Performed by: INTERNAL MEDICINE

## 2025-06-25 PROCEDURE — 25510000001 GADOPICLENOL 0.5 MMOL/ML SOLUTION: Performed by: INTERNAL MEDICINE

## 2025-06-25 RX ADMIN — GADOPICLENOL 9 ML: 485.1 INJECTION INTRAVENOUS at 15:22

## 2025-06-26 ENCOUNTER — OFFICE VISIT (OUTPATIENT)
Dept: INTERNAL MEDICINE | Facility: CLINIC | Age: 65
End: 2025-06-26
Payer: COMMERCIAL

## 2025-06-26 VITALS
WEIGHT: 201.6 LBS | DIASTOLIC BLOOD PRESSURE: 76 MMHG | RESPIRATION RATE: 18 BRPM | BODY MASS INDEX: 34.42 KG/M2 | HEART RATE: 84 BPM | TEMPERATURE: 98.5 F | HEIGHT: 64 IN | OXYGEN SATURATION: 95 % | SYSTOLIC BLOOD PRESSURE: 124 MMHG

## 2025-06-26 DIAGNOSIS — R16.0 LIVER MASS: ICD-10-CM

## 2025-06-26 DIAGNOSIS — Z12.31 ENCOUNTER FOR SCREENING MAMMOGRAM FOR MALIGNANT NEOPLASM OF BREAST: ICD-10-CM

## 2025-06-26 DIAGNOSIS — Z00.00 ANNUAL PHYSICAL EXAM: Primary | ICD-10-CM

## 2025-06-26 NOTE — PROGRESS NOTES
"Chief Complaint  Annual Exam    Subjective        Mary Jo Brizuela presents to [facility] PRIMARY [facility]  HPI:    Concerned about MRI reading today    Objective   Vital Signs:  /76 (BP Location: Left arm, Patient Position: Sitting, Cuff Size: Large Adult)   Pulse 84   Temp 98.5 °F (36.9 °C) (Infrared)   Resp 18   Ht 162.6 cm (64\")   Wt 91.4 kg (201 lb 9.6 oz)   SpO2 95%   BMI 34.60 kg/m²   Estimated body mass index is 34.6 kg/m² as calculated from the following:    Height as of this encounter: 162.6 cm (64\").    Weight as of this encounter: 91.4 kg (201 lb 9.6 oz).          Physical Exam  Vitals reviewed.   Constitutional:       General: She is not in acute distress.     Appearance: Normal appearance.   HENT:      Head: Normocephalic and atraumatic.      Nose: Nose normal.      Mouth/Throat:      Mouth: Mucous membranes are moist.   Eyes:      Conjunctiva/sclera: Conjunctivae normal.   Cardiovascular:      Rate and Rhythm: Normal rate and regular rhythm.   Pulmonary:      Effort: Pulmonary effort is normal.      Breath sounds: Normal breath sounds.   Abdominal:      Tenderness: There is abdominal tenderness. There is guarding. There is no rebound.   Skin:     General: Skin is warm and dry.   Neurological:      General: No focal deficit present.      Mental Status: She is alert.   Psychiatric:         Mood and Affect: Mood normal.        Result Review :    Reviewed GI note from recent visit         Assessment and Plan   Diagnoses and all orders for this visit:    1. Annual physical exam (Primary)    2. Encounter for screening mammogram for malignant neoplasm of breast  -     Mammo Screening Digital Tomosynthesis Bilateral With CAD    3. Liver mass      Discussed and reviewed abdominal MRI results and next steps.  Communicated with Dr. Meadows over chat regarding biopsy vs consultation with him.  He will see patient in clinic and will have his staff likely move colonoscopy plans.  "     Mammogram ordered for health maintenance.          Follow Up   No follow-ups on file.  Patient was given instructions and counseling regarding her condition or for health maintenance advice. Please see specific information pulled into the AVS if appropriate.

## 2025-07-04 ENCOUNTER — ANESTHESIA EVENT (OUTPATIENT)
Dept: PERIOP | Facility: HOSPITAL | Age: 65
End: 2025-07-04
Payer: COMMERCIAL

## 2025-07-09 ENCOUNTER — HOSPITAL ENCOUNTER (OUTPATIENT)
Facility: HOSPITAL | Age: 65
Setting detail: HOSPITAL OUTPATIENT SURGERY
Discharge: HOME | End: 2025-07-09
Attending: INTERNAL MEDICINE | Admitting: INTERNAL MEDICINE
Payer: COMMERCIAL

## 2025-07-09 ENCOUNTER — ANESTHESIA (OUTPATIENT)
Dept: PERIOP | Facility: HOSPITAL | Age: 65
End: 2025-07-09
Payer: COMMERCIAL

## 2025-07-09 VITALS
TEMPERATURE: 98 F | DIASTOLIC BLOOD PRESSURE: 72 MMHG | RESPIRATION RATE: 13 BRPM | BODY MASS INDEX: 33.95 KG/M2 | WEIGHT: 197.8 LBS | OXYGEN SATURATION: 95 % | SYSTOLIC BLOOD PRESSURE: 125 MMHG | HEART RATE: 70 BPM

## 2025-07-09 DIAGNOSIS — Z12.11 ENCOUNTER FOR SCREENING FOR MALIGNANT NEOPLASM OF COLON: ICD-10-CM

## 2025-07-09 DIAGNOSIS — R10.11 RIGHT UPPER QUADRANT ABDOMINAL PAIN: ICD-10-CM

## 2025-07-09 LAB — GLUCOSE BLDC GLUCOMTR-MCNC: 111 MG/DL (ref 70–130)

## 2025-07-09 PROCEDURE — 88305 TISSUE EXAM BY PATHOLOGIST: CPT | Performed by: INTERNAL MEDICINE

## 2025-07-09 PROCEDURE — 25810000003 LACTATED RINGERS PER 1000 ML: Performed by: NURSE ANESTHETIST, CERTIFIED REGISTERED

## 2025-07-09 PROCEDURE — 25010000002 PROPOFOL 200 MG/20ML EMULSION: Performed by: NURSE ANESTHETIST, CERTIFIED REGISTERED

## 2025-07-09 PROCEDURE — 25010000002 LIDOCAINE 2% SOLUTION: Performed by: NURSE ANESTHETIST, CERTIFIED REGISTERED

## 2025-07-09 PROCEDURE — 82948 REAGENT STRIP/BLOOD GLUCOSE: CPT

## 2025-07-09 PROCEDURE — 25010000002 GLYCOPYRROLATE 0.2 MG/ML SOLUTION: Performed by: NURSE ANESTHETIST, CERTIFIED REGISTERED

## 2025-07-09 RX ORDER — SODIUM CHLORIDE 0.9 % (FLUSH) 0.9 %
10 SYRINGE (ML) INJECTION AS NEEDED
Status: DISCONTINUED | OUTPATIENT
Start: 2025-07-09 | End: 2025-07-09 | Stop reason: HOSPADM

## 2025-07-09 RX ORDER — PROPOFOL 10 MG/ML
INJECTION, EMULSION INTRAVENOUS AS NEEDED
Status: DISCONTINUED | OUTPATIENT
Start: 2025-07-09 | End: 2025-07-09 | Stop reason: SURG

## 2025-07-09 RX ORDER — GLYCOPYRROLATE 0.2 MG/ML
INJECTION INTRAMUSCULAR; INTRAVENOUS AS NEEDED
Status: DISCONTINUED | OUTPATIENT
Start: 2025-07-09 | End: 2025-07-09 | Stop reason: SURG

## 2025-07-09 RX ORDER — OMEPRAZOLE 40 MG/1
40 CAPSULE, DELAYED RELEASE ORAL DAILY
Qty: 90 CAPSULE | Refills: 3 | Status: SHIPPED | OUTPATIENT
Start: 2025-07-09

## 2025-07-09 RX ORDER — LIDOCAINE HYDROCHLORIDE 20 MG/ML
INJECTION, SOLUTION INFILTRATION; PERINEURAL AS NEEDED
Status: DISCONTINUED | OUTPATIENT
Start: 2025-07-09 | End: 2025-07-09 | Stop reason: SURG

## 2025-07-09 RX ORDER — SODIUM CHLORIDE, SODIUM LACTATE, POTASSIUM CHLORIDE, CALCIUM CHLORIDE 600; 310; 30; 20 MG/100ML; MG/100ML; MG/100ML; MG/100ML
9 INJECTION, SOLUTION INTRAVENOUS CONTINUOUS
Status: DISCONTINUED | OUTPATIENT
Start: 2025-07-09 | End: 2025-07-09 | Stop reason: HOSPADM

## 2025-07-09 RX ORDER — SODIUM CHLORIDE, SODIUM LACTATE, POTASSIUM CHLORIDE, CALCIUM CHLORIDE 600; 310; 30; 20 MG/100ML; MG/100ML; MG/100ML; MG/100ML
100 INJECTION, SOLUTION INTRAVENOUS ONCE
Status: DISCONTINUED | OUTPATIENT
Start: 2025-07-09 | End: 2025-07-09 | Stop reason: HOSPADM

## 2025-07-09 RX ORDER — LIDOCAINE HYDROCHLORIDE 10 MG/ML
0.5 INJECTION, SOLUTION EPIDURAL; INFILTRATION; INTRACAUDAL; PERINEURAL ONCE AS NEEDED
Status: DISCONTINUED | OUTPATIENT
Start: 2025-07-09 | End: 2025-07-09 | Stop reason: HOSPADM

## 2025-07-09 RX ORDER — ONDANSETRON 2 MG/ML
4 INJECTION INTRAMUSCULAR; INTRAVENOUS ONCE AS NEEDED
Status: DISCONTINUED | OUTPATIENT
Start: 2025-07-09 | End: 2025-07-09 | Stop reason: HOSPADM

## 2025-07-09 RX ADMIN — PROPOFOL 50 MG: 10 INJECTION, EMULSION INTRAVENOUS at 12:46

## 2025-07-09 RX ADMIN — PROPOFOL 50 MG: 10 INJECTION, EMULSION INTRAVENOUS at 12:37

## 2025-07-09 RX ADMIN — PROPOFOL 50 MG: 10 INJECTION, EMULSION INTRAVENOUS at 12:41

## 2025-07-09 RX ADMIN — PROPOFOL 100 MG: 10 INJECTION, EMULSION INTRAVENOUS at 12:29

## 2025-07-09 RX ADMIN — PROPOFOL 50 MG: 10 INJECTION, EMULSION INTRAVENOUS at 12:33

## 2025-07-09 RX ADMIN — PROPOFOL 50 MG: 10 INJECTION, EMULSION INTRAVENOUS at 12:52

## 2025-07-09 RX ADMIN — SODIUM CHLORIDE, POTASSIUM CHLORIDE, SODIUM LACTATE AND CALCIUM CHLORIDE 9 ML/HR: 600; 310; 30; 20 INJECTION, SOLUTION INTRAVENOUS at 12:06

## 2025-07-09 RX ADMIN — LIDOCAINE HYDROCHLORIDE 80 MG: 20 INJECTION, SOLUTION INFILTRATION; PERINEURAL at 12:29

## 2025-07-09 RX ADMIN — GLYCOPYRROLATE 0.1 MG: 0.2 INJECTION INTRAMUSCULAR; INTRAVENOUS at 12:48

## 2025-07-09 NOTE — H&P
Patient Care Team:  Winter Danielle MD as PCP - General (Internal Medicine)    CHIEF COMPLAINT: Family hx of CRC or polyps and RUQ abdominal Pain    HISTORY OF PRESENT ILLNESS:  First Colon despite sister with Metastatic CRC    Past Medical History:   Diagnosis Date    Acute sinusitis, unspecified     Allergic 25 years ago    Environmental and weather changes.  Daily use of saline, and two nasal sprays    Anemia     Anxiety 01/20/2022    Anxiety and depression     Anxiety disorder     Asymptomatic varicose veins of bilateral lower extremities     Cataract     Small cataracts that are checked yearly    Corns and callosities     Deep vein thrombosis Since early twenties    Old blood clot from injury adhered to inner wall of vein in left leg    Diabetes mellitus     Was pre diabetic late 2022 to fall 2023    Frequency of micturition     Grief reaction     NORMAL    Headache 25 years ago    Get migraines periodically with extreme vertigo, nausea and tiredness. Every large change in barometric pressure ensures me a migraine    History of mammogram 06/2020    normal, never abn,    HL (hearing loss)     Hyperlipidemia     Left fibular fracture 04/2017    Major depressive disorder, single episode, unspecified     Menopausal and female climacteric states     Mild persistent asthma without complication 01/20/2022    Non-scarring hair loss     Nonscarring hair loss, unspecified     Obesity     Osteopenia June 2023    Beginning of Osteoporosis.  On medical records    Other acute sinusitis     Other amnesia     Other chest pain     Other fatigue     Pain in unspecified shoulder     Panic disorder without agoraphobia     Papanicolaou smear 06/2019    neg with neg HPV, repeat 5 years, due 2024    Paresthesia of skin     Radiculopathy, cervical region     Recurrent sinusitis     Seasonal allergies     Unspecified asthma, uncomplicated     Unspecified disorder of synovium and tendon, left shoulder     Viral infection,  unspecified     Vitamin D deficiency 06/13/2023     Past Surgical History:   Procedure Laterality Date    HYSTERECTOMY  2008    FIBROIDS    SUBTOTAL HYSTERECTOMY  2010    Partial    VASCULAR SURGERY  12/2023    bilateral legs     Family History   Problem Relation Age of Onset    Heart disease Mother     Asthma Mother         Allergy asthma    Vision loss Mother         Cataract surgery    Cancer Father         Blood & Bone    Vision loss Father         Eye surgery    Colon cancer Sister     Cancer Sister         Colon? Liver?    Colon polyps Brother     Anxiety disorder Brother         All five us siblings.   and both my children    Depression Brother         Me and three brothers.  Also, my late son    Alcohol abuse Brother         I don’t drink alcohol at all    Drug abuse Brother     Asthma Brother     Cancer Brother         Leukemia    Asthma Son     Early death Son         Took his life    Diabetes Maternal Grandmother     Vision loss Maternal Grandmother         Cataract surgery    Diabetes Paternal Grandmother     Breast cancer Neg Hx      Social History     Tobacco Use    Smoking status: Never     Passive exposure: Never    Smokeless tobacco: Never   Vaping Use    Vaping status: Never Used   Substance Use Topics    Alcohol use: Never    Drug use: Never     Medications Prior to Admission   Medication Sig Dispense Refill Last Dose/Taking    albuterol (PROVENTIL) (2.5 MG/3ML) 0.083% nebulizer solution Take 2.5 mg by nebulization Every 4 (Four) Hours As Needed for Wheezing. 30 mL 12 Past Week    escitalopram (LEXAPRO) 20 MG tablet Take 1 tablet by mouth Daily. 90 tablet 3 7/7/2025 Evening    polyethylene glycol (MiraLax) 17 g packet Take 17 g by mouth Daily.   7/8/2025    albuterol sulfate  (90 Base) MCG/ACT inhaler Inhale 2 puffs Every 4 (Four) Hours As Needed for Wheezing or Shortness of Air. 18 g 6 7/4/2025    Aspirin 81 MG capsule Take 1 capsule by mouth Daily. 90 capsule 3 7/2/2025    azelastine  (ASTELIN) 0.1 % nasal spray 1 spray into the nostril(s) as directed by provider Daily. Use in each nostril as directed 30 mL 5 7/2/2025    famotidine (Pepcid) 40 MG tablet Take 1 tablet by mouth Daily. 90 tablet 3 7/2/2025    fluticasone (FLONASE) 50 MCG/ACT nasal spray Administer 2 sprays into the nostril(s) as directed by provider Daily. 16 g 6 7/2/2025    Loratadine 10 MG capsule Take 1 capsule by mouth Daily. (Patient taking differently: Take 1 capsule by mouth Every Night.) 90 each 3 7/2/2025    montelukast (SINGULAIR) 10 MG tablet Take 1 tablet by mouth Every Night. 90 tablet 3 7/2/2025    Trelegy Ellipta 200-62.5-25 MCG/ACT inhaler Inhale 1 puff Daily.   7/2/2025    VITAMIN D, ERGOCALCIFEROL, PO Take 2,000 Units by mouth Every Night.   7/2/2025     Allergies:  Patient has no known allergies.    REVIEW OF SYSTEMS:  Please see the above history of present illness for pertinent positives and negatives.  The remainder of the patient's systems have been reviewed and are negative.     Vital Signs  Temp:  [98.7 °F (37.1 °C)] 98.7 °F (37.1 °C)    Flowsheet Rows      Flowsheet Row First Filed Value   Admission Height --   Admission Weight 89.7 kg (197 lb 12.8 oz) Documented at 07/09/2025 1151             Physical Exam:  Physical Exam   Constitutional: Patient appears well-developed and well-nourished and in no acute distress   HEENT:   Head: Normocephalic and atraumatic.   Eyes:  Pupils are equal, round, and reactive to light. EOM are intact. Sclerae are anicteric and non-injected.  Mouth and Throat: Patient has moist mucous membranes. Oropharynx is clear of any erythema or exudate.     Neck: Neck supple. No JVD present. No thyromegaly present. No lymphadenopathy present.  Cardiovascular: Regular rate, regular rhythm, S1 normal and S2 normal.  Exam reveals no gallop and no friction rub.  No murmur heard.  Pulmonary/Chest: Lungs are clear to auscultation bilaterally. No respiratory distress. No wheezes. No rhonchi. No  rales.   Abdominal: Soft. Bowel sounds are normal. No distension and no mass. There is no hepatosplenomegaly. There is no tenderness.   Musculoskeletal: Normal Muscle tone  Extremities: No edema. Pulses are palpable in all 4 extremities.  Neurological: Patient is alert and oriented to person, place, and time. Cranial nerves II-XII are grossly intact with no focal deficits.  Skin: Skin is warm. No rash noted. Nails show no clubbing.  No cyanosis or erythema.    Debilities/Disabilities Identified: None  Emotional Behavior: Appropriate     Results Review:   I reviewed the patient's new clinical results.    Lab Results (most recent)       None            Imaging Results (Most Recent)       None          reviewed    ECG/EMG Results (most recent)       None          reviewed    Assessment & Plan   Family hx of CRC or polyps and RUQ Abdominal Pain/  EGD and colonoscopy      I discussed the patient's findings and my recommendations with patient.     Crow Meadows MD  07/09/25  11:54 EDT    Time: 10 min prior to procedure.

## 2025-07-09 NOTE — BRIEF OP NOTE
ESOPHAGOGASTRODUODENOSCOPY WITH BIOPSY, COLONOSCOPY WITH POLYPECTOMY  Progress Note    Mary Jo Brizuela  7/9/2025    Pre-op Diagnosis:   Right upper quadrant abdominal pain [R10.11]  Encounter for screening for malignant neoplasm of colon [Z12.11]       Post-Op Diagnosis Codes:     * Right upper quadrant abdominal pain [R10.11]     * Encounter for screening for malignant neoplasm of colon [Z12.11]     * Duodenitis [535.6]     * Gastritis [K29.70]     * Reflux esophagitis [K21.00]     * Colon polyps [K63.5]    Procedure(s):      Procedure(s):  ESOPHAGOGASTRODUODENOSCOPY WITH BIOPSY  COLONOSCOPY WITH POLYPECTOMY              Surgeon(s):  Crow Meadows MD    Anesthesia: Monitored Anesthesia Care    Staff:   Circulator: Lashawn Goncalves RN  Scrub Person: Mitali Pro; Brianna Carvalho       Estimated Blood Loss: none    Urine Voided: * No values recorded between 7/9/2025 12:23 PM and 7/9/2025  1:01 PM *    Specimens:                Specimens       ID Source Type Tests Collected By Collected At Frozen?    A Small Intestine Tissue TISSUE PATHOLOGY EXAM   Crow Meadows MD 7/9/25 1234 No    Description: duodenum bx    This specimen was not marked as sent.    B Gastric, Body Tissue TISSUE PATHOLOGY EXAM   Crow Meadows MD 7/9/25 1236 No    Description: gastric bx    This specimen was not marked as sent.    C Esophagus, Distal Tissue TISSUE PATHOLOGY EXAM   Crow Meadows MD 7/9/25 1238 No    Description: distal esophageal bx    This specimen was not marked as sent.    D Large Intestine, Right / Ascending Colon Polyp TISSUE PATHOLOGY EXAM   Crow Meadows MD 7/9/25 1252 No    Description: ascending polyp x2    This specimen was not marked as sent.              Drains: * No LDAs found *    Findings: Moderate Duodenitis-Biopsy  Mild Gastritis-biopsy  Reflux Esophagitis-Biopsy    Colon to Cecum Good Prep  [id number]      Complications: Tank Maria  Rika Meadows MD     Date: 7/9/2025  Time: 13:01 EDT

## 2025-07-09 NOTE — ANESTHESIA POSTPROCEDURE EVALUATION
Patient: Mary Jo Brizuela    Procedure Summary       Date: 07/09/25 Room / Location: [facility] ENDOSCOPY 1 /  [facility] OR    Anesthesia Start: 1223 Anesthesia Stop: 1304    Procedures:       ESOPHAGOGASTRODUODENOSCOPY WITH BIOPSY (Esophagus)      COLONOSCOPY WITH POLYPECTOMY Diagnosis:       Right upper quadrant abdominal pain      Encounter for screening for malignant neoplasm of colon      Duodenitis      Gastritis      Reflux esophagitis      Colon polyps      (Right upper quadrant abdominal pain [R10.11])      (Encounter for screening for malignant neoplasm of colon [Z12.11])    Surgeons: Crow Meadows MD Provider: Lacie Landon CRNA    Anesthesia Type: MAC ASA Status: 2            Anesthesia Type: MAC    Vitals  Vitals Value Taken Time   /72 07/09/25 13:30   Temp 98 °F (36.7 °C) 07/09/25 13:03   Pulse 67 07/09/25 13:33   Resp 14 07/09/25 13:20   SpO2 97 % 07/09/25 13:33   Vitals shown include unfiled device data.        Post Anesthesia Care and Evaluation    Patient location during evaluation: PHASE II  Patient participation: complete - patient participated  Level of consciousness: awake and alert  Pain score: 4 (RUQ pain same as pre-op)    Airway patency: patent  Anesthetic complications: No anesthetic complications  PONV Status: none  Cardiovascular status: acceptable  Respiratory status: acceptable  Hydration status: acceptable

## 2025-07-09 NOTE — ANESTHESIA PREPROCEDURE EVALUATION
Anesthesia Evaluation     Patient summary reviewed and Nursing notes reviewed   no history of anesthetic complications:   NPO Solid Status: > 8 hours  NPO Liquid Status: > 8 hours           Airway   Mallampati: I  TM distance: >3 FB  Neck ROM: full  No difficulty expected  Dental - normal exam     Pulmonary - normal exam   (+) asthma (no recent use),  (-) not a smoker  Cardiovascular - normal exam  Exercise tolerance: good (4-7 METS)    Rhythm: regular  Rate: normal    (+) DVT (age 20s) resolved, hyperlipidemia      Neuro/Psych  (+) psychiatric history Anxiety and Depression  GI/Hepatic/Renal/Endo    (+) obesity, GERD, hepatitis (cyst on liver), diabetes mellitus (pre-diabetesFBS 111)    ROS Comment: Pain RUQ    Musculoskeletal (-) negative ROS    Abdominal  - normal exam   Substance History - negative use     OB/GYN negative ob/gyn ROS         Other - negative ROS                   Anesthesia Plan    ASA 2     MAC     intravenous induction     Anesthetic plan, risks, benefits, and alternatives have been provided, discussed and informed consent has been obtained with: patient and spouse/significant other.  Pre-procedure education provided  Use of blood products discussed with patient and spouse/significant other  Consented to blood products.    Plan discussed with CRNA.    CODE STATUS:

## 2025-07-11 LAB
CYTO UR: NORMAL
LAB AP CASE REPORT: NORMAL
PATH REPORT.FINAL DX SPEC: NORMAL
PATH REPORT.GROSS SPEC: NORMAL

## 2025-07-15 ENCOUNTER — LAB (OUTPATIENT)
Dept: LAB | Facility: HOSPITAL | Age: 65
End: 2025-07-15
Payer: COMMERCIAL

## 2025-07-15 ENCOUNTER — OFFICE VISIT (OUTPATIENT)
Dept: GASTROENTEROLOGY | Facility: CLINIC | Age: 65
End: 2025-07-15
Payer: COMMERCIAL

## 2025-07-15 VITALS
HEIGHT: 64 IN | DIASTOLIC BLOOD PRESSURE: 72 MMHG | SYSTOLIC BLOOD PRESSURE: 122 MMHG | BODY MASS INDEX: 34.21 KG/M2 | WEIGHT: 200.4 LBS

## 2025-07-15 DIAGNOSIS — K76.89 HEPATIC CYST: Primary | ICD-10-CM

## 2025-07-15 LAB
ALPHA-FETOPROTEIN: 2.74 NG/ML (ref 0–8.3)
CEA SERPL-MCNC: 2.97 NG/ML

## 2025-07-15 PROCEDURE — 82105 ALPHA-FETOPROTEIN SERUM: CPT | Performed by: INTERNAL MEDICINE

## 2025-07-15 PROCEDURE — 86231 EMA EACH IG CLASS: CPT | Performed by: INTERNAL MEDICINE

## 2025-07-15 PROCEDURE — 86258 DGP ANTIBODY EACH IG CLASS: CPT | Performed by: INTERNAL MEDICINE

## 2025-07-15 PROCEDURE — 86364 TISS TRNSGLTMNASE EA IG CLAS: CPT | Performed by: INTERNAL MEDICINE

## 2025-07-15 PROCEDURE — 82784 ASSAY IGA/IGD/IGG/IGM EACH: CPT | Performed by: INTERNAL MEDICINE

## 2025-07-15 PROCEDURE — 82378 CARCINOEMBRYONIC ANTIGEN: CPT | Performed by: INTERNAL MEDICINE

## 2025-07-15 PROCEDURE — 36415 COLL VENOUS BLD VENIPUNCTURE: CPT | Performed by: INTERNAL MEDICINE

## 2025-07-15 PROCEDURE — 99213 OFFICE O/P EST LOW 20 MIN: CPT | Performed by: INTERNAL MEDICINE

## 2025-07-15 NOTE — PROGRESS NOTES
PATIENT INFORMATION  Mary Jo Brizuela       - 1960    CHIEF COMPLAINT  Chief Complaint   Patient presents with    Follow-up       HISTORY OF PRESENT ILLNESS  Here for Cyst  Her  CT was for Lsft sided/Flank pain that is positional  MRI confirmed large benign appearing posterior hepatic Cyst    She is still symptomatic so interested in pursuing surgery and has been recommended Erasto Rice - with which I agree      REVIEWED PERTINENT RESULTS/ LABS  Lab Results   Component Value Date    CASEREPORT  2025     Surgical Pathology Report                         Case: [id number]                                  Authorizing Provider:  Crow Meadows        Collected:           2025 12:34 PM                                 MD Rika                                                                   Ordering Location:     [facility]   Received:            2025 01:07 PM                                 OR                                                                           Pathologist:           Kevin Payan MD                                                         Specimens:   1) - Small Intestine, duodenum bx                                                                   2) - Gastric, Body, gastric bx                                                                      3) - Esophagus, Distal, distal esophageal bx                                                        4) - Large Intestine, Right / Ascending Colon, ascending polyp x2                          FINALDX  2025     1.  Small bowel, duodenum, biopsy: Benign small bowel mucosa with   - A.  Slight increase in intraepithelial lymphocytes   - B.  Focal area of atrophy of the villi   - C.  No significant acute inflammation    Comment: The changes are minimal but do suggest the possibility of celiac disease.  Recommend correlation with serologic studies.    2.  Stomach, biopsy: Benign gastric mucosa with   -A.  Mild chronic inflammation (mild non-specific chronic inactive  gastritis).    -B. No intestinal metaplasia.   -C. No Helicobacter pylori.     3.  Esophagus, distal, biopsy: Benign squamous and gastric mucosa with-   -A. Chronic inflammation in the gastric mucosa.   -B. No intestinal metaplasia.   -C. No Helicobacter pylori.     4.  Colon, right ascending, biopsy: Tubular adenoma         Lab Results   Component Value Date    HGB 14.8 06/19/2025    MCV 96.0 06/19/2025     06/19/2025    ALT 24 06/19/2025    AST 21 06/19/2025    HGBA1C 6.30 (H) 06/19/2025    INR 1.01 05/01/2017    TRIG 85 06/19/2025      MRI Abdomen With & Without Contrast  Result Date: 6/25/2025  Narrative: MRI ABDOMEN W WO CONTRAST Date of Exam: 6/25/2025 2:38 PM EDT Indication: please evaluate liver cyst and liver morphology in detail; family history of liver cancer in sister.  Comparison: CT abdomen pelvis 6/3/2025 Technique:  Routine multiplanar/multisequence images of the abdomen were obtained before and after the uneventful administration of Vueway. Findings: Heart size normal. No pericardial effusion. Questionable hiatal hernia versus mild thickening of the distal esophagus. Liver is enlarged measuring 20 cm in length. There is severe signal loss on out of phase imaging consistent with hepatic steatosis. Nonspecific prominence of the hepatic caudate lobe measuring 4.9 cm. No discrete nodular hepatic contour. Multiple small T2 hyperintense hepatic cysts. There is a large unilocular somewhat exophytic appearing T2 hyperintense cyst arising off the posterior aspect of right hepatic lobe measuring 8.4 x 9.8 x 9.1 cm. There is some intrinsic T1 signal indicating hemorrhagic or proteinaceous debris. No suspicious nodular enhancement. Portal vasculature patent. Benign papillary projection of the medial aspect of hepatic caudate lobe, anatomic variant. Spleen is borderline/mildly enlarged measuring 13 cm AP dimension. No varices or ascites.  Benign focal cystic change in the gallbladder fundus versus fold. No visualized gallstones or signs of acute cholecystitis. Normal caliber biliary tree. Normal size, contour and intrinsic T1 signal intensity of the pancreas. No suspicious mass, active inflammation or drainable collection. No adrenal nodule. Symmetric renal size, contour and enhancement. Negative for solid mass or hydronephrosis. Expected configuration stomach and duodenum. No bowel obstruction. Aortic atherosclerotic disease without aneurysm. Few reactive princess hepatic lymph nodes. No suspicious adenopathy. No drainable fluid collection. No acute abdominal wall findings. No infiltrative marrow process or suspicious bone lesion.     Impression: Impression: 1. Hepatomegaly with severe hepatic steatosis. There is nonspecific prominence of the hepatic caudate lobe which can be seen with early changes of chronic liver disease. Borderline/mild splenomegaly noted. 2. Exophytic unilocular cystic lesion arising off the posterior aspect of right hepatic lobe measuring up to 9.8 cm containing internal hemorrhagic/proteinaceous debris. Findings could reflect a mucinous cystic neoplasm (biliary cystadenoma) without suspicious enhancing soft tissue. Other tiny hepatic cysts noted. 3. No acute MRI findings. Few other chronic/ancillary findings as above. Electronically Signed: Parveen Rao MD  6/25/2025 10:37 PM EDT  Workstation ID: [id number]      REVIEW OF SYSTEMS  Review of Systems      ACTIVE PROBLEMS  Patient Active Problem List    Diagnosis     Right upper quadrant abdominal pain [R10.11]     Encounter for screening for malignant neoplasm of colon [Z12.11]     Vitamin D deficiency [E55.9]     Other hyperlipidemia [E78.49]     Pre-diabetes [R73.03]     Allergic rhinitis [J30.9]     Other constipation [K59.09]     Anxiety [F41.9]     Moderate persistent asthma without complication [J45.40]     Avulsion fracture of distal fibula [S82.659A]     Chest pain  [R07.9]          PAST MEDICAL HISTORY  Past Medical History:   Diagnosis Date    Acute sinusitis, unspecified     Allergic 25 years ago    Environmental and weather changes.  Daily use of saline, and two nasal sprays    Anemia     Anxiety 01/20/2022    Anxiety and depression     Anxiety disorder     Asymptomatic varicose veins of bilateral lower extremities     Cataract     Small cataracts that are checked yearly    Colon polyp 7/2025    Corns and callosities     Deep vein thrombosis Since early twenties    Old blood clot from injury adhered to inner wall of vein in left leg    Diabetes mellitus     Was pre diabetic late 2022 to fall 2023    Frequency of micturition     Grief reaction     NORMAL    Headache 25 years ago    Get migraines periodically with extreme vertigo, nausea and tiredness. Every large change in barometric pressure ensures me a migraine    History of mammogram 06/2020    normal, never abn,    HL (hearing loss)     Hyperlipidemia     Left fibular fracture 04/2017    Major depressive disorder, single episode, unspecified     Menopausal and female climacteric states     Mild persistent asthma without complication 01/20/2022    Non-scarring hair loss     Nonscarring hair loss, unspecified     Obesity     Osteopenia June 2023    Beginning of Osteoporosis.  On medical records    Other acute sinusitis     Other amnesia     Other chest pain     Other fatigue     Pain in unspecified shoulder     Panic disorder without agoraphobia     Papanicolaou smear 06/2019    neg with neg HPV, repeat 5 years, due 2024    Paresthesia of skin     Radiculopathy, cervical region     Recurrent sinusitis     Seasonal allergies     Unspecified asthma, uncomplicated     Unspecified disorder of synovium and tendon, left shoulder     Viral infection, unspecified     Vitamin D deficiency 06/13/2023         SURGICAL HISTORY  Past Surgical History:   Procedure Laterality Date    COLONOSCOPY  7/2025    COLONOSCOPY W/ POLYPECTOMY N/A  07/09/2025    Procedure: COLONOSCOPY WITH POLYPECTOMY;  Surgeon: Crow Meadows MD;  Location:  [facility] OR;  Service: Gastroenterology;  Laterality: N/A;  ascending polyp x2 (forcep)    ENDOSCOPY N/A 07/09/2025    Procedure: ESOPHAGOGASTRODUODENOSCOPY WITH BIOPSY;  Surgeon: Crow Meadows MD;  Location:  [facility] OR;  Service: Gastroenterology;  Laterality: N/A;  duodenitis; duodenum bx; gastric bx; distal esophageal bx    HYSTERECTOMY  2008    FIBROIDS    SUBTOTAL HYSTERECTOMY  2010    Partial    UPPER GASTROINTESTINAL ENDOSCOPY  7-2025    VASCULAR SURGERY  12/2023    bilateral legs         FAMILY HISTORY  Family History   Problem Relation Age of Onset    Heart disease Mother     Asthma Mother         Allergy asthma    Vision loss Mother         Cataract surgery    Cancer Father         Blood & Bone    Vision loss Father         Eye surgery    Colon cancer Sister     Cancer Sister         Colon? Liver?    Colon polyps Brother     Anxiety disorder Brother         All five us siblings.   and both my children    Depression Brother         Me and three brothers.  Also, my late son    Alcohol abuse Brother         I don’t drink alcohol at all    Drug abuse Brother     Asthma Brother     Cancer Brother         Leukemia    Asthma Son     Early death Son         Took his life    Diabetes Maternal Grandmother     Vision loss Maternal Grandmother         Cataract surgery    Diabetes Paternal Grandmother     Breast cancer Neg Hx          SOCIAL HISTORY  Social History     Occupational History    Occupation: FULL TIME   Tobacco Use    Smoking status: Never     Passive exposure: Never    Smokeless tobacco: Never   Vaping Use    Vaping status: Never Used   Substance and Sexual Activity    Alcohol use: Never    Drug use: Never    Sexual activity: Not Currently     Partners: Male     Birth control/protection: Abstinence         CURRENT MEDICATIONS    Current Outpatient Medications:     albuterol (PROVENTIL)  "(2.5 MG/3ML) 0.083% nebulizer solution, Take 2.5 mg by nebulization Every 4 (Four) Hours As Needed for Wheezing., Disp: 30 mL, Rfl: 12    albuterol sulfate  (90 Base) MCG/ACT inhaler, Inhale 2 puffs Every 4 (Four) Hours As Needed for Wheezing or Shortness of Air., Disp: 18 g, Rfl: 6    azelastine (ASTELIN) 0.1 % nasal spray, 1 spray into the nostril(s) as directed by provider Daily. Use in each nostril as directed, Disp: 30 mL, Rfl: 5    escitalopram (LEXAPRO) 20 MG tablet, Take 1 tablet by mouth Daily., Disp: 90 tablet, Rfl: 3    famotidine (Pepcid) 40 MG tablet, Take 1 tablet by mouth Daily., Disp: 90 tablet, Rfl: 3    fluticasone (FLONASE) 50 MCG/ACT nasal spray, Administer 2 sprays into the nostril(s) as directed by provider Daily., Disp: 16 g, Rfl: 6    Loratadine 10 MG capsule, Take 1 capsule by mouth Daily. (Patient taking differently: Take 1 capsule by mouth Every Night.), Disp: 90 each, Rfl: 3    montelukast (SINGULAIR) 10 MG tablet, Take 1 tablet by mouth Every Night., Disp: 90 tablet, Rfl: 3    omeprazole (priLOSEC) 40 MG capsule, Take 1 capsule by mouth Daily., Disp: 90 capsule, Rfl: 3    polyethylene glycol (MiraLax) 17 g packet, Take 17 g by mouth Daily., Disp: , Rfl:     Trelegy Ellipta 200-62.5-25 MCG/ACT inhaler, Inhale 1 puff Daily., Disp: , Rfl:     VITAMIN D, ERGOCALCIFEROL, PO, Take 2,000 Units by mouth Every Night., Disp: , Rfl:     Aspirin 81 MG capsule, Take 1 capsule by mouth Daily. (Patient not taking: Reported on 7/15/2025), Disp: 90 capsule, Rfl: 3    ALLERGIES  Patient has no known allergies.    VITALS  Vitals:    07/15/25 1011   BP: 122/72   Weight: 90.9 kg (200 lb 6.4 oz)   Height: 162.6 cm (64\")       PHYSICAL EXAM  Debilities/Disabilities Identified: None  Emotional Behavior: Appropriate  Wt Readings from Last 3 Encounters:   07/15/25 90.9 kg (200 lb 6.4 oz)   07/09/25 89.7 kg (197 lb 12.8 oz)   06/26/25 91.4 kg (201 lb 9.6 oz)     Ht Readings from Last 1 Encounters: " "  07/15/25 162.6 cm (64\")     Body mass index is 34.4 kg/m².  Physical Exam  Constitutional:       Appearance: She is well-developed. She is not diaphoretic.   HENT:      Head: Normocephalic and atraumatic.   Eyes:      General: No scleral icterus.     Conjunctiva/sclera: Conjunctivae normal.      Pupils: Pupils are equal, round, and reactive to light.   Neck:      Thyroid: No thyromegaly.   Cardiovascular:      Rate and Rhythm: Normal rate and regular rhythm.      Heart sounds: Normal heart sounds. No murmur heard.     No gallop.   Pulmonary:      Effort: Pulmonary effort is normal.      Breath sounds: Normal breath sounds. No wheezing or rales.   Abdominal:      General: Bowel sounds are normal. There is no distension or abdominal bruit.      Palpations: Abdomen is soft. There is no shifting dullness, fluid wave or mass.      Tenderness: There is no abdominal tenderness. There is no guarding. Negative signs include Tejada's sign.      Hernia: There is no hernia in the ventral area.   Musculoskeletal:         General: Normal range of motion.      Cervical back: Normal range of motion and neck supple.   Lymphadenopathy:      Cervical: No cervical adenopathy.   Skin:     General: Skin is warm and dry.      Findings: No erythema or rash.   Neurological:      Mental Status: She is alert and oriented to person, place, and time.   Psychiatric:         Mood and Affect: Mood normal.         Behavior: Behavior normal.       CLINICAL DATA REVIEWED   reviewed previous lab results and integrated with today's visit, reviewed notes from other physicians and/or last GI encounter, reviewed previous endoscopy results and available photos, reviewed surgical pathology results from previous biopsies    ASSESSMENT  Diagnoses and all orders for this visit:    Hepatic cyst  -     CEA  -     AFP Tumor Marker  -     Ambulatory Referral to General Surgery          PLAN  Her cyst is symptomatic so will look to referral , check Marker as " above      Return in about 6 months (around 1/15/2026).    I have discussed the above plan with the patient.  They verbalize understanding and are in agreement with the plan.  They have been advised to contact the office for any questions, concerns, or changes related to their health.

## 2025-08-14 ENCOUNTER — HOSPITAL ENCOUNTER (OUTPATIENT)
Dept: MAMMOGRAPHY | Facility: HOSPITAL | Age: 65
Discharge: HOME | End: 2025-08-14
Admitting: INTERNAL MEDICINE
Payer: COMMERCIAL

## 2025-08-14 PROCEDURE — 77063 BREAST TOMOSYNTHESIS BI: CPT

## 2025-08-14 PROCEDURE — 77063 BREAST TOMOSYNTHESIS BI: CPT | Performed by: RADIOLOGY

## 2025-08-14 PROCEDURE — 77067 SCR MAMMO BI INCL CAD: CPT

## 2025-08-14 PROCEDURE — 77067 SCR MAMMO BI INCL CAD: CPT | Performed by: RADIOLOGY

## (undated) DEVICE — THE BITE BLOCK MAXI, LATEX FREE STRAP IS USED TO PROTECT THE ENDOSCOPE INSERTION TUBE FROM BEING BITTEN BY THE PATIENT.

## (undated) DEVICE — JACKT LAB F/R KNIT CUFF/COLR XLG BLU

## (undated) DEVICE — SYR LL W/SCALE/MARK 3ML STRL

## (undated) DEVICE — ADAPT CLN BIOGUARD AIR/H2O DISP

## (undated) DEVICE — LINER SURG CANSTR SXN S/RIGD 1500CC

## (undated) DEVICE — SOL IRR H2O BO 1000ML STRL

## (undated) DEVICE — VIAL FORMALIN CAP 10P 40ML

## (undated) DEVICE — FRCP BX RADJAW4 NDL 2.8 240CM LG OG BX40

## (undated) DEVICE — BW-412T DISP COMBO CLEANING BRUSH: Brand: SINGLE USE COMBINATION CLEANING BRUSH

## (undated) DEVICE — GLV SURG SENSICARE PI MIC PF SZ8 LF STRL

## (undated) DEVICE — Device

## (undated) DEVICE — KT ORCA ORCAPOD DISP STRL